# Patient Record
Sex: MALE | Race: WHITE | Employment: OTHER | URBAN - METROPOLITAN AREA
[De-identification: names, ages, dates, MRNs, and addresses within clinical notes are randomized per-mention and may not be internally consistent; named-entity substitution may affect disease eponyms.]

---

## 2017-01-19 ENCOUNTER — APPOINTMENT (OUTPATIENT)
Dept: ULTRASOUND IMAGING | Facility: HOSPITAL | Age: 62
DRG: 440 | End: 2017-01-19
Attending: EMERGENCY MEDICINE
Payer: COMMERCIAL

## 2017-01-19 ENCOUNTER — APPOINTMENT (OUTPATIENT)
Dept: CT IMAGING | Facility: HOSPITAL | Age: 62
DRG: 440 | End: 2017-01-19
Attending: EMERGENCY MEDICINE
Payer: COMMERCIAL

## 2017-01-19 ENCOUNTER — HOSPITAL ENCOUNTER (INPATIENT)
Facility: HOSPITAL | Age: 62
LOS: 3 days | Discharge: HOME | DRG: 440 | End: 2017-01-23
Attending: EMERGENCY MEDICINE | Admitting: HOSPITALIST
Payer: COMMERCIAL

## 2017-01-19 DIAGNOSIS — R79.89 LOW TSH LEVEL: ICD-10-CM

## 2017-01-19 DIAGNOSIS — E83.42 HYPOMAGNESEMIA: ICD-10-CM

## 2017-01-19 DIAGNOSIS — K85.90 ACUTE PANCREATITIS WITHOUT INFECTION OR NECROSIS, UNSPECIFIED PANCREATITIS TYPE: Primary | ICD-10-CM

## 2017-01-19 DIAGNOSIS — N18.9 CHRONIC RENAL INSUFFICIENCY, UNSPECIFIED STAGE: ICD-10-CM

## 2017-01-19 LAB
ALBUMIN SERPL BCP-MCNC: 4.4 G/DL (ref 3.5–4.8)
ALP SERPL-CCNC: 110 U/L (ref 32–100)
ALT SERPL-CCNC: 31 U/L (ref 17–63)
ANION GAP SERPL CALC-SCNC: 13 MMOL/L (ref 0–18)
AST SERPL-CCNC: 31 U/L (ref 15–41)
BACTERIA UR QL AUTO: NEGATIVE /HPF
BASOPHILS # BLD: 0.1 K/UL (ref 0–0.2)
BASOPHILS NFR BLD: 1 %
BILIRUB DIRECT SERPL-MCNC: 0.1 MG/DL (ref 0–0.2)
BILIRUB SERPL-MCNC: 0.8 MG/DL (ref 0.3–1.2)
BILIRUB UR QL: NEGATIVE
BUN SERPL-MCNC: 35 MG/DL (ref 8–20)
BUN/CREAT SERPL: 18.6 (ref 10–20)
CALCIUM SERPL-MCNC: 9 MG/DL (ref 8.5–10.5)
CHLORIDE SERPL-SCNC: 106 MMOL/L (ref 95–110)
CLARITY UR: CLEAR
CO2 SERPL-SCNC: 19 MMOL/L (ref 22–32)
COLOR UR: YELLOW
CREAT SERPL-MCNC: 1.88 MG/DL (ref 0.5–1.5)
EOSINOPHIL # BLD: 0.2 K/UL (ref 0–0.7)
EOSINOPHIL NFR BLD: 2 %
ERYTHROCYTE [DISTWIDTH] IN BLOOD BY AUTOMATED COUNT: 15 % (ref 11–15)
GLUCOSE SERPL-MCNC: 161 MG/DL (ref 70–99)
GLUCOSE UR-MCNC: NEGATIVE MG/DL
HCT VFR BLD AUTO: 42.3 % (ref 41–52)
HGB BLD-MCNC: 14.2 G/DL (ref 13.5–17.5)
HGB UR QL STRIP.AUTO: NEGATIVE
KETONES UR-MCNC: NEGATIVE MG/DL
LEUKOCYTE ESTERASE UR QL STRIP.AUTO: NEGATIVE
LIPASE SERPL-CCNC: 7116 U/L (ref 22–51)
LYMPHOCYTES # BLD: 2.7 K/UL (ref 1–4)
LYMPHOCYTES NFR BLD: 25 %
MAGNESIUM SERPL-MCNC: 1.6 MG/DL (ref 1.8–2.5)
MCH RBC QN AUTO: 29.7 PG (ref 27–32)
MCHC RBC AUTO-ENTMCNC: 33.5 G/DL (ref 32–37)
MCV RBC AUTO: 88.7 FL (ref 80–100)
MONOCYTES # BLD: 1.1 K/UL (ref 0–1)
MONOCYTES NFR BLD: 10 %
NEUTROPHILS # BLD AUTO: 6.6 K/UL (ref 1.8–7.7)
NEUTROPHILS NFR BLD: 62 %
NITRITE UR QL STRIP.AUTO: NEGATIVE
OSMOLALITY UR CALC.SUM OF ELEC: 297 MOSM/KG (ref 275–295)
PH UR: 5 [PH] (ref 5–8)
PLATELET # BLD AUTO: 136 K/UL (ref 140–400)
PMV BLD AUTO: 9.2 FL (ref 7.4–10.3)
POTASSIUM SERPL-SCNC: 3.5 MMOL/L (ref 3.3–5.1)
PROT SERPL-MCNC: 7.3 G/DL (ref 5.9–8.4)
PROT UR-MCNC: 30 MG/DL
RBC # BLD AUTO: 4.77 M/UL (ref 4.5–5.9)
RBC #/AREA URNS AUTO: 1 /HPF
SODIUM SERPL-SCNC: 138 MMOL/L (ref 136–144)
SP GR UR STRIP: 1.01 (ref 1–1.03)
T3 SERPL-MCNC: 1.04 NG/ML (ref 0.87–1.78)
T4 FREE SERPL-MCNC: 1.1 NG/DL (ref 0.58–1.64)
TSH SERPL-ACNC: 0.19 UIU/ML (ref 0.34–5.6)
UROBILINOGEN UR STRIP-ACNC: <2
VIT C UR-MCNC: 40 MG/DL
WBC # BLD AUTO: 10.7 K/UL (ref 4–11)
WBC #/AREA URNS AUTO: 1 /HPF

## 2017-01-19 PROCEDURE — 76705 ECHO EXAM OF ABDOMEN: CPT

## 2017-01-19 PROCEDURE — 74176 CT ABD & PELVIS W/O CONTRAST: CPT

## 2017-01-19 RX ORDER — SODIUM CHLORIDE 9 MG/ML
1000 INJECTION, SOLUTION INTRAVENOUS ONCE
Status: DISCONTINUED | OUTPATIENT
Start: 2017-01-19 | End: 2017-01-19

## 2017-01-19 RX ORDER — SODIUM CHLORIDE 9 MG/ML
1000 INJECTION, SOLUTION INTRAVENOUS ONCE
Status: COMPLETED | OUTPATIENT
Start: 2017-01-19 | End: 2017-01-19

## 2017-01-19 RX ORDER — MORPHINE SULFATE 4 MG/ML
4 INJECTION, SOLUTION INTRAMUSCULAR; INTRAVENOUS ONCE
Status: COMPLETED | OUTPATIENT
Start: 2017-01-19 | End: 2017-01-19

## 2017-01-19 RX ORDER — ONDANSETRON 2 MG/ML
4 INJECTION INTRAMUSCULAR; INTRAVENOUS ONCE
Status: COMPLETED | OUTPATIENT
Start: 2017-01-19 | End: 2017-01-19

## 2017-01-19 RX ORDER — HYDROMORPHONE HYDROCHLORIDE 1 MG/ML
1 INJECTION, SOLUTION INTRAMUSCULAR; INTRAVENOUS; SUBCUTANEOUS ONCE
Status: COMPLETED | OUTPATIENT
Start: 2017-01-19 | End: 2017-01-19

## 2017-01-19 RX ORDER — KETOROLAC TROMETHAMINE 30 MG/ML
15 INJECTION, SOLUTION INTRAMUSCULAR; INTRAVENOUS ONCE
Status: COMPLETED | OUTPATIENT
Start: 2017-01-19 | End: 2017-01-19

## 2017-01-19 RX ORDER — HYDROMORPHONE HYDROCHLORIDE 1 MG/ML
0.5 INJECTION, SOLUTION INTRAMUSCULAR; INTRAVENOUS; SUBCUTANEOUS ONCE
Status: COMPLETED | OUTPATIENT
Start: 2017-01-19 | End: 2017-01-19

## 2017-01-19 RX ORDER — HYDROMORPHONE HYDROCHLORIDE 1 MG/ML
0.5 INJECTION, SOLUTION INTRAMUSCULAR; INTRAVENOUS; SUBCUTANEOUS ONCE
Status: DISCONTINUED | OUTPATIENT
Start: 2017-01-19 | End: 2017-01-19

## 2017-01-20 PROBLEM — K85.90 ACUTE PANCREATITIS WITHOUT INFECTION OR NECROSIS, UNSPECIFIED PANCREATITIS TYPE (HCC): Status: ACTIVE | Noted: 2017-01-20

## 2017-01-20 PROBLEM — R79.89 LOW TSH LEVEL: Status: ACTIVE | Noted: 2017-01-20

## 2017-01-20 PROBLEM — N18.9 CHRONIC RENAL INSUFFICIENCY, UNSPECIFIED STAGE: Status: ACTIVE | Noted: 2017-01-20

## 2017-01-20 PROBLEM — K85.90 ACUTE PANCREATITIS WITHOUT INFECTION OR NECROSIS, UNSPECIFIED PANCREATITIS TYPE: Status: ACTIVE | Noted: 2017-01-20

## 2017-01-20 PROBLEM — E83.42 HYPOMAGNESEMIA: Status: ACTIVE | Noted: 2017-01-20

## 2017-01-20 LAB
ALBUMIN SERPL BCP-MCNC: 3.4 G/DL (ref 3.5–4.8)
ALBUMIN/GLOB SERPL: 1.4 {RATIO} (ref 1–2)
ALP SERPL-CCNC: 88 U/L (ref 32–100)
ALT SERPL-CCNC: 25 U/L (ref 17–63)
ANION GAP SERPL CALC-SCNC: 7 MMOL/L (ref 0–18)
AST SERPL-CCNC: 21 U/L (ref 15–41)
BASOPHILS # BLD: 0 K/UL (ref 0–0.2)
BASOPHILS NFR BLD: 0 %
BILIRUB SERPL-MCNC: 0.9 MG/DL (ref 0.3–1.2)
BUN SERPL-MCNC: 29 MG/DL (ref 8–20)
BUN/CREAT SERPL: 17.8 (ref 10–20)
CALCIUM SERPL-MCNC: 8.4 MG/DL (ref 8.5–10.5)
CHLORIDE SERPL-SCNC: 109 MMOL/L (ref 95–110)
CO2 SERPL-SCNC: 23 MMOL/L (ref 22–32)
CREAT SERPL-MCNC: 1.63 MG/DL (ref 0.5–1.5)
EOSINOPHIL # BLD: 0.1 K/UL (ref 0–0.7)
EOSINOPHIL NFR BLD: 1 %
ERYTHROCYTE [DISTWIDTH] IN BLOOD BY AUTOMATED COUNT: 14.9 % (ref 11–15)
GLOBULIN PLAS-MCNC: 2.4 G/DL (ref 2.5–3.7)
GLUCOSE SERPL-MCNC: 158 MG/DL (ref 70–99)
HCT VFR BLD AUTO: 38 % (ref 41–52)
HGB BLD-MCNC: 12.6 G/DL (ref 13.5–17.5)
LIPASE SERPL-CCNC: 1009 U/L (ref 22–51)
LYMPHOCYTES # BLD: 1.1 K/UL (ref 1–4)
LYMPHOCYTES NFR BLD: 11 %
MAGNESIUM SERPL-MCNC: 1.7 MG/DL (ref 1.8–2.5)
MCH RBC QN AUTO: 29.8 PG (ref 27–32)
MCHC RBC AUTO-ENTMCNC: 33.2 G/DL (ref 32–37)
MCV RBC AUTO: 89.7 FL (ref 80–100)
MONOCYTES # BLD: 0.7 K/UL (ref 0–1)
MONOCYTES NFR BLD: 7 %
NEUTROPHILS # BLD AUTO: 8.1 K/UL (ref 1.8–7.7)
NEUTROPHILS NFR BLD: 82 %
OSMOLALITY UR CALC.SUM OF ELEC: 297 MOSM/KG (ref 275–295)
PLATELET # BLD AUTO: 103 K/UL (ref 140–400)
PMV BLD AUTO: 8.9 FL (ref 7.4–10.3)
POTASSIUM SERPL-SCNC: 4 MMOL/L (ref 3.3–5.1)
PROT SERPL-MCNC: 5.8 G/DL (ref 5.9–8.4)
RBC # BLD AUTO: 4.24 M/UL (ref 4.5–5.9)
SODIUM SERPL-SCNC: 139 MMOL/L (ref 136–144)
TRIGL SERPL-MCNC: 277 MG/DL (ref 1–149)
WBC # BLD AUTO: 9.9 K/UL (ref 4–11)

## 2017-01-20 PROCEDURE — 99223 1ST HOSP IP/OBS HIGH 75: CPT | Performed by: HOSPITALIST

## 2017-01-20 PROCEDURE — 99221 1ST HOSP IP/OBS SF/LOW 40: CPT | Performed by: INTERNAL MEDICINE

## 2017-01-20 RX ORDER — ONDANSETRON 2 MG/ML
4 INJECTION INTRAMUSCULAR; INTRAVENOUS EVERY 4 HOURS PRN
Status: DISCONTINUED | OUTPATIENT
Start: 2017-01-20 | End: 2017-01-23

## 2017-01-20 RX ORDER — FUROSEMIDE 20 MG/1
10 TABLET ORAL DAILY
Status: ON HOLD | COMMUNITY
End: 2020-02-23

## 2017-01-20 RX ORDER — HEPARIN SODIUM 5000 [USP'U]/ML
5000 INJECTION, SOLUTION INTRAVENOUS; SUBCUTANEOUS EVERY 12 HOURS
Status: DISCONTINUED | OUTPATIENT
Start: 2017-01-20 | End: 2017-01-23

## 2017-01-20 RX ORDER — SODIUM CHLORIDE 0.9 % (FLUSH) 0.9 %
10 SYRINGE (ML) INJECTION AS NEEDED
Status: DISCONTINUED | OUTPATIENT
Start: 2017-01-20 | End: 2017-01-23

## 2017-01-20 RX ORDER — HYDROMORPHONE HYDROCHLORIDE 1 MG/ML
0.5 INJECTION, SOLUTION INTRAMUSCULAR; INTRAVENOUS; SUBCUTANEOUS
Status: DISCONTINUED | OUTPATIENT
Start: 2017-01-20 | End: 2017-01-23

## 2017-01-20 RX ORDER — AMLODIPINE BESYLATE 5 MG/1
5 TABLET ORAL DAILY
Status: DISCONTINUED | OUTPATIENT
Start: 2017-01-20 | End: 2017-01-23

## 2017-01-20 RX ORDER — DIPHENHYDRAMINE HCL 25 MG
25 CAPSULE ORAL ONCE
Status: COMPLETED | OUTPATIENT
Start: 2017-01-20 | End: 2017-01-20

## 2017-01-20 RX ORDER — LEVOTHYROXINE SODIUM 175 UG/1
175 TABLET ORAL
COMMUNITY

## 2017-01-20 RX ORDER — HYDROMORPHONE HYDROCHLORIDE 1 MG/ML
1 INJECTION, SOLUTION INTRAMUSCULAR; INTRAVENOUS; SUBCUTANEOUS
Status: DISCONTINUED | OUTPATIENT
Start: 2017-01-20 | End: 2017-01-23

## 2017-01-20 RX ORDER — LEVOTHYROXINE SODIUM 175 UG/1
175 TABLET ORAL
Status: DISCONTINUED | OUTPATIENT
Start: 2017-01-20 | End: 2017-01-23

## 2017-01-20 RX ORDER — SODIUM CHLORIDE, SODIUM LACTATE, POTASSIUM CHLORIDE, CALCIUM CHLORIDE 600; 310; 30; 20 MG/100ML; MG/100ML; MG/100ML; MG/100ML
INJECTION, SOLUTION INTRAVENOUS CONTINUOUS
Status: DISCONTINUED | OUTPATIENT
Start: 2017-01-20 | End: 2017-01-22

## 2017-01-20 RX ORDER — HYDROCODONE BITARTRATE AND ACETAMINOPHEN 5; 325 MG/1; MG/1
1 TABLET ORAL EVERY 6 HOURS PRN
Status: DISCONTINUED | OUTPATIENT
Start: 2017-01-20 | End: 2017-01-23

## 2017-01-20 RX ORDER — ACETAMINOPHEN 325 MG/1
650 TABLET ORAL EVERY 6 HOURS PRN
Status: DISCONTINUED | OUTPATIENT
Start: 2017-01-20 | End: 2017-01-23

## 2017-01-20 NOTE — ED INITIAL ASSESSMENT (HPI)
Pt brought in via EMS from work for c/o abd pain N/V which onset approx. 1500. Denies having diarrhea or fevers. Denies sob or cp.

## 2017-01-20 NOTE — PROGRESS NOTES
[facility]     Gastroenterology Progress Note    Mary Kenyon Patient Status:  Inpatient    3/16/1955 MRN [id number]   Location [facility] 4W/SW/SE Attending Teressa Mart MD   Hosp Day # 1 PCP None Pcp       Assessm Psychiatric/Behavioral: Negative for behavioral problems and agitation. All other systems reviewed and are negative.       Objective:   Patient Vitals for the past 24 hrs:   BP Temp Temp src Pulse Resp SpO2 Height Weight   01/20/17 0537 140/88 mmHg (!) alert and oriented to person, place, and time. No motor deficit. Skin: Skin is warm and dry. No lesion and no rash noted. He is not diaphoretic. No erythema. No pallor. Psychiatric: He has a normal mood and affect.  His behavior is normal. Judgment and

## 2017-01-20 NOTE — ED NOTES
Pt states hx of partial bowel obstruction last year. Pt reports having same s/s at that time, N/V and abd pain. Diffuse ABD tenderness noted upon palpation.  Pt c/o severe abd pain, Dr. Kandi Winters updated and orders for pain medication and zofran received an

## 2017-01-20 NOTE — ED PROVIDER NOTES
Patient Seen in: [facility] AND [facility] Emergency Department    History   Patient presents with:  Nausea/Vomiting/Diarrhea (gastrointestinal)    Stated Complaint: N/V    HPI    59-year-old male presents for complaint of epigastric abdominal pain, nausea, vomi Negative. Musculoskeletal: Negative. Skin: Negative. Neurological: Negative. All other systems reviewed and are negative. Positive for stated complaint: N/V  Other systems are as noted in HPI. Constitutional and vital signs reviewed. Calculated Osmolality 297 (*)     GFR, Non- 37 (*)     GFR, -American 44 (*)     All other components within normal limits   MAGNESIUM - Abnormal; Notable for the following:     Magnesium 1.6 (*)     All other components within genoveva at 175 mL/hr    Imaging:   Ct Abdomen+pelvis(cpt=74176)    1/19/2017  PROCEDURE:   CT ABDOMEN + PELVIS (SLE=89039)  COMPARISON:   [facility], CT PF RENAL STONE ABD/P WO CON, 12/21/2014, 1:37.   INDICATIONS:   Patient has abdomen  pain and  e (CST): Rakesh Varma MD on 1/19/2017 at 19:55     Approved by (CST): Rakesh Varma MD on 1/19/2017 at 20:03          1/19/2017  CONCLUSION:  1. Acute pancreatitis. 2. Cholelithiasis.              SpO2: Normal 99%               Dispositio

## 2017-01-20 NOTE — CONSULTS
[facility]    PATIENT'S NAME: Vashti Whiting   ATTENDING PHYSICIAN: Loly Meyers MD   CONSULTING PHYSICIAN: Neal Perez MD   PATIENT ACCOUNT#:   [de-identified]    LOCATION:  [address] [place] [address] Road #:   [id number]       DATE OF BIRTH: Colon cancer. 5.   Gout. 6.   Small-bowel obstruction. 7.   Complex thoracic trauma due to a horseback riding injury (2013). 8.   Thyroid cancer. 9.   Parathyroid adenoma. PAST SURGICAL HISTORY:    1. Total thyroidectomy and parathyroidectomy. abscess or fluid collection.   Other findings include the followin.8 cm gallstone, no evidence for cholecystitis, mild atherosclerosis, transverse colon staple line, no evidence for bowel obstruction, no significant lymphadenopathy, mild degenerative sp 11:32:06  t: 01/20/2017 14:22:39  Job [id number]  [facility]/

## 2017-01-20 NOTE — CONSULTS
[facility] AND [facility]  Report of Consultation    Dana Proud Patient Status:  Emergency    3/16/1955 MRN [id number]   Location [address] Attending Walter Holt MD   Hosp Day # 1 PCP None Pcp     Reason for Consultat tenderness. Extremities: No edema, cyanosis, or clubbing. Skin: Warm and dry.     Laboratory Data:    Lab Results  Component Value Date   WBC 10.7 01/19/2017   HGB 14.2 01/19/2017   HCT 42.3 01/19/2017    01/19/2017   CREATSERUM 1.88 01/19/2017   B

## 2017-01-20 NOTE — H&P
[address] Patient Status:  Inpatient    3/16/1955 MRN [id number]   Location [facility] 4W/SW/SE Attending Tobin Moser MD   Hosp Day # 1 PCP None Pcp     Date:  2017  Date of Admi (240 MG) MG Oral Tab Take 800 mg by mouth 2 (two) times daily. Review of Systems:   Pertinent items are noted in HPI. Physical Exam:   Vital Signs:  Blood pressure 140/88, pulse 81, temperature 97.4 °F (36.3 °C), temperature source Oral, resp.  r cancer, non-hodgkins lymphoma and also thyroid cancer    Hx.  Of kidney stone    Prophylaxis  -hep sq    Mandi Henry MD  1/20/2017

## 2017-01-21 LAB
ANION GAP SERPL CALC-SCNC: 6 MMOL/L (ref 0–18)
BASOPHILS # BLD: 0 K/UL (ref 0–0.2)
BASOPHILS NFR BLD: 0 %
BUN SERPL-MCNC: 19 MG/DL (ref 8–20)
BUN/CREAT SERPL: 12.8 (ref 10–20)
CALCIUM SERPL-MCNC: 8.5 MG/DL (ref 8.5–10.5)
CHLORIDE SERPL-SCNC: 107 MMOL/L (ref 95–110)
CO2 SERPL-SCNC: 25 MMOL/L (ref 22–32)
CREAT SERPL-MCNC: 1.49 MG/DL (ref 0.5–1.5)
EOSINOPHIL # BLD: 0.1 K/UL (ref 0–0.7)
EOSINOPHIL NFR BLD: 1 %
ERYTHROCYTE [DISTWIDTH] IN BLOOD BY AUTOMATED COUNT: 14.6 % (ref 11–15)
GLUCOSE SERPL-MCNC: 137 MG/DL (ref 70–99)
HCT VFR BLD AUTO: 36.2 % (ref 41–52)
HGB BLD-MCNC: 12.4 G/DL (ref 13.5–17.5)
LYMPHOCYTES # BLD: 1 K/UL (ref 1–4)
LYMPHOCYTES NFR BLD: 12 %
MAGNESIUM SERPL-MCNC: 1.5 MG/DL (ref 1.8–2.5)
MCH RBC QN AUTO: 29.9 PG (ref 27–32)
MCHC RBC AUTO-ENTMCNC: 34.2 G/DL (ref 32–37)
MCV RBC AUTO: 87.4 FL (ref 80–100)
MONOCYTES # BLD: 0.7 K/UL (ref 0–1)
MONOCYTES NFR BLD: 9 %
NEUTROPHILS # BLD AUTO: 6.6 K/UL (ref 1.8–7.7)
NEUTROPHILS NFR BLD: 78 %
OSMOLALITY UR CALC.SUM OF ELEC: 290 MOSM/KG (ref 275–295)
PLATELET # BLD AUTO: 101 K/UL (ref 140–400)
PMV BLD AUTO: 9 FL (ref 7.4–10.3)
POTASSIUM SERPL-SCNC: 3.8 MMOL/L (ref 3.3–5.1)
RBC # BLD AUTO: 4.14 M/UL (ref 4.5–5.9)
SODIUM SERPL-SCNC: 138 MMOL/L (ref 136–144)
WBC # BLD AUTO: 8.5 K/UL (ref 4–11)

## 2017-01-21 PROCEDURE — 99233 SBSQ HOSP IP/OBS HIGH 50: CPT | Performed by: HOSPITALIST

## 2017-01-21 RX ORDER — POTASSIUM CHLORIDE 20 MEQ/1
40 TABLET, EXTENDED RELEASE ORAL ONCE
Status: COMPLETED | OUTPATIENT
Start: 2017-01-21 | End: 2017-01-21

## 2017-01-21 RX ORDER — MAGNESIUM OXIDE 400 MG (241.3 MG MAGNESIUM) TABLET
800 TABLET ONCE
Status: COMPLETED | OUTPATIENT
Start: 2017-01-21 | End: 2017-01-21

## 2017-01-21 NOTE — PROGRESS NOTES
[facility]    Progress note     Nato Course Patient Status:  Inpatient    3/16/1955 MRN [id number]   Location [facility] 4W/SW/SE Attending Jessica Phelps MD   Hosp Day # 2 PCP None Pcp     Date:  2017  Date of Admissio SpO2 95 %. GENERAL:  The patient appeared to be in no distress and was comfortable. SKIN:  Warm and hydrated  PSYCHIATRIC: Calm and cooperative   HEENT:  Head was atraumatic and normocephalic. Eyes: Sclera was anicteric.   Pupils were equal.   NECK:

## 2017-01-21 NOTE — PROGRESS NOTES
[facility]     Gastroenterology Progress Note    Chun Kirkpatrick Patient Status:  Inpatient    3/16/1955 MRN [id number]   Location [facility] 4W/SW/SE Attending Roosvelt Habermann, MD   Hosp Day # 2 PCP None Pcp       Asses light-headedness and headaches. Hematological: Negative for adenopathy. Does not bruise/bleed easily. Psychiatric/Behavioral: Negative for behavioral problems and agitation. All other systems reviewed and are negative.       Objective:   Patient Vital not diaphoretic. No erythema. Psychiatric: He has a normal mood and affect.  His behavior is normal. Judgment and thought content normal.       Results:     Recent Labs   Lab  01/19/17   1840  01/20/17   0722  01/21/17   0638   RBC  4.77  4.24*  4.14*   H

## 2017-01-22 LAB
ALBUMIN SERPL BCP-MCNC: 2.9 G/DL (ref 3.5–4.8)
ALBUMIN/GLOB SERPL: 1.1 {RATIO} (ref 1–2)
ALP SERPL-CCNC: 83 U/L (ref 32–100)
ALT SERPL-CCNC: 18 U/L (ref 17–63)
ANION GAP SERPL CALC-SCNC: 5 MMOL/L (ref 0–18)
AST SERPL-CCNC: 18 U/L (ref 15–41)
BASOPHILS # BLD: 0 K/UL (ref 0–0.2)
BASOPHILS NFR BLD: 0 %
BILIRUB SERPL-MCNC: 1.3 MG/DL (ref 0.3–1.2)
BUN SERPL-MCNC: 15 MG/DL (ref 8–20)
BUN/CREAT SERPL: 9.4 (ref 10–20)
CALCIUM SERPL-MCNC: 8.5 MG/DL (ref 8.5–10.5)
CHLORIDE SERPL-SCNC: 105 MMOL/L (ref 95–110)
CO2 SERPL-SCNC: 30 MMOL/L (ref 22–32)
CREAT SERPL-MCNC: 1.59 MG/DL (ref 0.5–1.5)
EOSINOPHIL # BLD: 0.2 K/UL (ref 0–0.7)
EOSINOPHIL NFR BLD: 3 %
ERYTHROCYTE [DISTWIDTH] IN BLOOD BY AUTOMATED COUNT: 15 % (ref 11–15)
GLOBULIN PLAS-MCNC: 2.7 G/DL (ref 2.5–3.7)
GLUCOSE SERPL-MCNC: 120 MG/DL (ref 70–99)
HCT VFR BLD AUTO: 35.2 % (ref 41–52)
HGB BLD-MCNC: 11.9 G/DL (ref 13.5–17.5)
LIPASE SERPL-CCNC: 58 U/L (ref 22–51)
LYMPHOCYTES # BLD: 1.1 K/UL (ref 1–4)
LYMPHOCYTES NFR BLD: 15 %
MAGNESIUM SERPL-MCNC: 1.3 MG/DL (ref 1.8–2.5)
MCH RBC QN AUTO: 29.8 PG (ref 27–32)
MCHC RBC AUTO-ENTMCNC: 33.9 G/DL (ref 32–37)
MCV RBC AUTO: 87.8 FL (ref 80–100)
MONOCYTES # BLD: 0.8 K/UL (ref 0–1)
MONOCYTES NFR BLD: 11 %
NEUTROPHILS # BLD AUTO: 5.4 K/UL (ref 1.8–7.7)
NEUTROPHILS NFR BLD: 72 %
OSMOLALITY UR CALC.SUM OF ELEC: 292 MOSM/KG (ref 275–295)
PLATELET # BLD AUTO: 88 K/UL (ref 140–400)
PMV BLD AUTO: 9.5 FL (ref 7.4–10.3)
POTASSIUM SERPL-SCNC: 4 MMOL/L (ref 3.3–5.1)
PROT SERPL-MCNC: 5.6 G/DL (ref 5.9–8.4)
RBC # BLD AUTO: 4.01 M/UL (ref 4.5–5.9)
SODIUM SERPL-SCNC: 140 MMOL/L (ref 136–144)
WBC # BLD AUTO: 7.6 K/UL (ref 4–11)

## 2017-01-22 PROCEDURE — 99232 SBSQ HOSP IP/OBS MODERATE 35: CPT | Performed by: HOSPITALIST

## 2017-01-22 RX ORDER — MAGNESIUM OXIDE 400 MG (241.3 MG MAGNESIUM) TABLET
800 TABLET ONCE
Status: COMPLETED | OUTPATIENT
Start: 2017-01-22 | End: 2017-01-22

## 2017-01-22 RX ORDER — LOPERAMIDE HYDROCHLORIDE 2 MG/1
2 CAPSULE ORAL 4 TIMES DAILY PRN
Status: DISCONTINUED | OUTPATIENT
Start: 2017-01-22 | End: 2017-01-23

## 2017-01-22 RX ORDER — DIPHENHYDRAMINE HCL 25 MG
25 CAPSULE ORAL NIGHTLY PRN
Status: DISCONTINUED | OUTPATIENT
Start: 2017-01-22 | End: 2017-01-23

## 2017-01-22 NOTE — PROGRESS NOTES
[place] [facility] - [facility]    Progress note     Alexandra Sanders Patient Status:  Inpatient    3/16/1955 MRN [id number]   Location [facility] 4W/SW/SE Attending Celsa Wright MD   [facility] Day # 3 PCP None Pcp     Date:  2017  Date of Admissio be in no distress and was comfortable. SKIN:  Warm and hydrated  PSYCHIATRIC: Calm and cooperative   HEENT:  Head was atraumatic and normocephalic. Eyes: Sclera was anicteric. Pupils were equal.   NECK:  There was no JVD.    CHEST:  Symmetrical movement

## 2017-01-22 NOTE — PROGRESS NOTES
[facility]    Progress Note    Maggie Georgieut Patient Status:  Inpatient    3/16/1955 MRN [id number]   Location [facility] 4W/SW/SE Attending Bernabe Samson MD   Hosp Day # 3 PCP None Pcp       Subjective:   Denies complaints 01/22/2017   ALB 2.9 01/22/2017   TP 5.6 01/22/2017                   Savana Cohen MD  1/22/2017

## 2017-01-22 NOTE — PROGRESS NOTES
[facility]     Gastroenterology Progress Note    Tino Sales Patient Status:  Inpatient    3/16/1955 MRN [id number]   Location [facility] 4W/SW/SE Attending Travis Umaña MD   Hosp Day # 3 PCP None Pcp       Asses Psychiatric/Behavioral: Negative for behavioral problems and agitation. All other systems reviewed and are negative.       Objective:   Patient Vitals for the past 24 hrs:   BP Temp Temp src Pulse Resp SpO2   01/22/17 0500 128/82 mmHg 98.7 °F (37.1 °C) Recent Labs   Lab  01/20/17   0722  01/21/17   0638  01/22/17   0623   RBC  4.24*  4.14*  4.01*   HGB  12.6*  12.4*  11.9*   HCT  38.0*  36.2*  35.2*   MCV  89.7  87.4  87.8   MCH  29.8  29.9  29.8   MCHC  33.2  34.2  33.9   RDW  14.9  14.6  15.0   WBC

## 2017-01-23 VITALS
SYSTOLIC BLOOD PRESSURE: 135 MMHG | WEIGHT: 170 LBS | DIASTOLIC BLOOD PRESSURE: 86 MMHG | TEMPERATURE: 99 F | OXYGEN SATURATION: 96 % | HEIGHT: 70 IN | HEART RATE: 99 BPM | BODY MASS INDEX: 24.34 KG/M2 | RESPIRATION RATE: 18 BRPM

## 2017-01-23 LAB
ALBUMIN SERPL BCP-MCNC: 3 G/DL (ref 3.5–4.8)
ALBUMIN/GLOB SERPL: 1 {RATIO} (ref 1–2)
ALP SERPL-CCNC: 93 U/L (ref 32–100)
ALT SERPL-CCNC: 18 U/L (ref 17–63)
ANION GAP SERPL CALC-SCNC: 7 MMOL/L (ref 0–18)
AST SERPL-CCNC: 16 U/L (ref 15–41)
BASOPHILS # BLD: 0 K/UL (ref 0–0.2)
BASOPHILS NFR BLD: 0 %
BILIRUB SERPL-MCNC: 0.9 MG/DL (ref 0.3–1.2)
BUN SERPL-MCNC: 17 MG/DL (ref 8–20)
BUN/CREAT SERPL: 10.7 (ref 10–20)
CALCIUM SERPL-MCNC: 8.5 MG/DL (ref 8.5–10.5)
CHLORIDE SERPL-SCNC: 103 MMOL/L (ref 95–110)
CO2 SERPL-SCNC: 28 MMOL/L (ref 22–32)
CREAT SERPL-MCNC: 1.59 MG/DL (ref 0.5–1.5)
EOSINOPHIL # BLD: 0.3 K/UL (ref 0–0.7)
EOSINOPHIL NFR BLD: 3 %
ERYTHROCYTE [DISTWIDTH] IN BLOOD BY AUTOMATED COUNT: 14.6 % (ref 11–15)
GLOBULIN PLAS-MCNC: 2.9 G/DL (ref 2.5–3.7)
GLUCOSE SERPL-MCNC: 129 MG/DL (ref 70–99)
HCT VFR BLD AUTO: 34.9 % (ref 41–52)
HGB BLD-MCNC: 11.9 G/DL (ref 13.5–17.5)
LYMPHOCYTES # BLD: 1.1 K/UL (ref 1–4)
LYMPHOCYTES NFR BLD: 13 %
MAGNESIUM SERPL-MCNC: 1.3 MG/DL (ref 1.8–2.5)
MCH RBC QN AUTO: 29.9 PG (ref 27–32)
MCHC RBC AUTO-ENTMCNC: 34 G/DL (ref 32–37)
MCV RBC AUTO: 87.9 FL (ref 80–100)
MONOCYTES # BLD: 1 K/UL (ref 0–1)
MONOCYTES NFR BLD: 11 %
NEUTROPHILS # BLD AUTO: 6.8 K/UL (ref 1.8–7.7)
NEUTROPHILS NFR BLD: 74 %
OSMOLALITY UR CALC.SUM OF ELEC: 289 MOSM/KG (ref 275–295)
PLATELET # BLD AUTO: 97 K/UL (ref 140–400)
PMV BLD AUTO: 9.4 FL (ref 7.4–10.3)
POTASSIUM SERPL-SCNC: 3.7 MMOL/L (ref 3.3–5.1)
PROT SERPL-MCNC: 5.9 G/DL (ref 5.9–8.4)
RBC # BLD AUTO: 3.97 M/UL (ref 4.5–5.9)
SODIUM SERPL-SCNC: 138 MMOL/L (ref 136–144)
WBC # BLD AUTO: 9.2 K/UL (ref 4–11)

## 2017-01-23 PROCEDURE — 99239 HOSP IP/OBS DSCHRG MGMT >30: CPT | Performed by: HOSPITALIST

## 2017-01-23 NOTE — PAYOR COMM NOTE
Admit Orders     Start     Ordered    01/20/17 0119  Admit to inpatient Once -  ([facility] [address] [facility])   Once     Ordering Provider:  Oniel Coleman MD   Question Answer Comment   Diagnosis Acute pancreatitis without infection or

## 2017-01-23 NOTE — DISCHARGE SUMMARY
[facility]    Discharge Summary    Gina Mcgrath Patient Status:  Inpatient    3/16/1955 MRN [id number]   Location [facility] 4W/SW/SE Attending Mary Williamson MD   Hosp Day # 4 PCP None Pcp     Date of Admission:  Levothyroxine Sodium 175 MCG Tabs   Last time this was given:  175 mcg on 1/23/2017  7:04 AM   Commonly known as:  SYNTHROID, LEVOTHROID        Take 175 mcg by mouth before breakfast.    Refills:  0       lisinopril 40 MG Tabs   Commonly known as:  PRIN

## 2017-01-23 NOTE — PAYOR COMM NOTE
Attending Physician: No att. providers found    Review Type: CONTINUED STAY  Reviewer: Polo Garcia     Date: January 23, 2017 - 4:12 PM  Payor: [address] Number: [id number]  Admit date: 1/19/2017  6:28 PM :  South Senior MD (Physician)             Patient Seen in: [facility] Emergency Department    History   Patient presents with:  Nausea/Vomiting/Diarrhea (gastrointestinal)    Stated Complaint: N/V    HPI    51-year-old male pr Positive for nausea, vomiting and abdominal pain. Genitourinary: Negative. Musculoskeletal: Negative. Skin: Negative. Neurological: Negative. All other systems reviewed and are negative.       Positive for stated complaint: N/V  Other systems (*)     CO2 19 (*)     BUN 35 (*)     Creatinine 1.88 (*)     Calculated Osmolality 297 (*)     GFR, Non- 37 (*)     GFR, -American 44 (*)     All other components within normal limits   MAGNESIUM - Abnormal; Notable for the followin accepts admission  Dr. Yandel Vail accepts consult, requests IV fluids at 175 mL/hr    Imaging:   Ct Abdomen+pelvis(cpt=74176)    1/19/2017  PROCEDURE:   CT ABDOMEN + PELVIS (CPT=74176)  COMPARISON:   [place] [facility], CT PF RENAL STONE ABD/P WO CON, 1 visible pulmonary or pleural disease. OTHER: Negative. Dictated by (CST): Ronney Hodgkin MD on 1/19/2017 at 19:55     Approved by (CST): Ronney Hodgkin MD on 1/19/2017 at 20:03          1/19/2017  CONCLUSION:  1. Acute pancreatitis.  2. Medical History   Diagnosis Date   • Pneumothorax 2013     partial pneumothorax left lung and minimal on right side   • Colon cancer ([facility]     • Non Hodgkin's lymphoma ([facility]    • H/O bone marrow transplant ([facility]    • Bilateral cataracts      bilateral avila Well healed incisions  MUSCULOSKELETAL:  There was no deformity. There was full range of motion in all the extremities. EXTREMITIES: There was no edema, clubbing or cyanosis  NEUROLOGICAL:  There was no focal deficit.   Cranial nerves II through XII were

## 2017-01-23 NOTE — PLAN OF CARE
GASTROINTESTINAL - ADULT    • Minimal or absence of nausea and vomiting Adequate for Discharge    • Maintains or returns to baseline bowel function Adequate for Discharge        Integumentary status not within defined limits    • Pt's integumentary status

## 2017-04-10 ENCOUNTER — OFFICE VISIT (OUTPATIENT)
Dept: DERMATOLOGY CLINIC | Facility: CLINIC | Age: 62
End: 2017-04-10

## 2017-04-10 DIAGNOSIS — L82.1 SEBORRHEIC KERATOSES: ICD-10-CM

## 2017-04-10 DIAGNOSIS — D23.30 BENIGN NEOPLASM OF SKIN OF FACE: ICD-10-CM

## 2017-04-10 DIAGNOSIS — D23.5 BENIGN NEOPLASM OF SKIN OF TRUNK, EXCEPT SCROTUM: ICD-10-CM

## 2017-04-10 DIAGNOSIS — D23.4 BENIGN NEOPLASM OF SCALP AND SKIN OF NECK: ICD-10-CM

## 2017-04-10 DIAGNOSIS — D23.60 BENIGN NEOPLASM OF SKIN OF UPPER LIMB, INCLUDING SHOULDER, UNSPECIFIED LATERALITY: Primary | ICD-10-CM

## 2017-04-10 PROCEDURE — 99213 OFFICE O/P EST LOW 20 MIN: CPT | Performed by: DERMATOLOGY

## 2017-04-10 PROCEDURE — 99212 OFFICE O/P EST SF 10 MIN: CPT | Performed by: DERMATOLOGY

## 2017-04-13 ENCOUNTER — APPOINTMENT (OUTPATIENT)
Dept: PREADMISSION TESTING | Facility: HOSPITAL | Age: 62
End: 2017-04-13

## 2017-04-13 VITALS
BODY MASS INDEX: 25.76 KG/M2 | OXYGEN SATURATION: 98 % | DIASTOLIC BLOOD PRESSURE: 71 MMHG | HEIGHT: 71 IN | RESPIRATION RATE: 16 BRPM | WEIGHT: 184 LBS | SYSTOLIC BLOOD PRESSURE: 124 MMHG | HEART RATE: 68 BPM | TEMPERATURE: 98.9 F

## 2017-04-13 LAB
ANION GAP SERPL CALCULATED.3IONS-SCNC: 12.7 MMOL/L
BUN BLD-MCNC: 23 MG/DL (ref 8–23)
BUN/CREAT SERPL: 21.5 (ref 7–25)
CALCIUM SPEC-SCNC: 9.3 MG/DL (ref 8.6–10.5)
CHLORIDE SERPL-SCNC: 98 MMOL/L (ref 98–107)
CO2 SERPL-SCNC: 25.3 MMOL/L (ref 22–29)
CREAT BLD-MCNC: 1.07 MG/DL (ref 0.76–1.27)
DEPRECATED RDW RBC AUTO: 41.1 FL (ref 37–54)
ERYTHROCYTE [DISTWIDTH] IN BLOOD BY AUTOMATED COUNT: 12.1 % (ref 11.5–14.5)
GFR SERPL CREATININE-BSD FRML MDRD: 70 ML/MIN/1.73
GLUCOSE BLD-MCNC: 239 MG/DL (ref 65–99)
HCT VFR BLD AUTO: 40.8 % (ref 40.4–52.2)
HGB BLD-MCNC: 14.1 G/DL (ref 13.7–17.6)
MCH RBC QN AUTO: 32.1 PG (ref 27–32.7)
MCHC RBC AUTO-ENTMCNC: 34.6 G/DL (ref 32.6–36.4)
MCV RBC AUTO: 92.9 FL (ref 79.8–96.2)
PLATELET # BLD AUTO: 204 10*3/MM3 (ref 140–500)
PMV BLD AUTO: 10.2 FL (ref 6–12)
POTASSIUM BLD-SCNC: 4.2 MMOL/L (ref 3.5–5.2)
RBC # BLD AUTO: 4.39 10*6/MM3 (ref 4.6–6)
SODIUM BLD-SCNC: 136 MMOL/L (ref 136–145)
WBC NRBC COR # BLD: 6.71 10*3/MM3 (ref 4.5–10.7)

## 2017-04-13 PROCEDURE — 36415 COLL VENOUS BLD VENIPUNCTURE: CPT

## 2017-04-13 PROCEDURE — 93010 ELECTROCARDIOGRAM REPORT: CPT | Performed by: INTERNAL MEDICINE

## 2017-04-13 PROCEDURE — 80048 BASIC METABOLIC PNL TOTAL CA: CPT

## 2017-04-13 PROCEDURE — 85027 COMPLETE CBC AUTOMATED: CPT

## 2017-04-13 PROCEDURE — 93005 ELECTROCARDIOGRAM TRACING: CPT

## 2017-04-13 RX ORDER — INSULIN GLARGINE 100 [IU]/ML
12 INJECTION, SOLUTION SUBCUTANEOUS NIGHTLY
COMMUNITY
End: 2019-10-14

## 2017-04-13 RX ORDER — LEVOTHYROXINE SODIUM 137 UG/1
137 TABLET ORAL EVERY MORNING
COMMUNITY
End: 2022-06-01

## 2017-04-13 NOTE — DISCHARGE INSTRUCTIONS
SURGERY 4-20-17   ARRIVAL TIME 9:30    Take the following medications the morning of surgery with a small sip of water:    NONE          General Instructions:  • Do not eat solid food after midnight the night before surgery.  • You may drink clear liquids day of surgery but must stop at least one hour before your hospital arrival time.  • It is beneficial for you to have a clear drink that contains carbohydrates the day of surgery.  We suggest a 20 ounce bottle of Gatorade or Powerade for non-diabetic patients or a 20 ounce bottle of G2 or Powerade Zero for diabetic patients. (Pediatric patients, are not advised to drink a 20 ounce carbohydrate drink)    Clear liquids are liquids you can see through.  Nothing red in color.     Plain water                               Sports drinks  Sodas                                   Gelatin (Jell-O)  Fruit juices without pulp such as white grape juice and apple juice  Popsicles that contain no fruit or yogurt  Tea or coffee (no cream or milk added)  Gatorade / Powerade  G2 / Powerade Zero    • Infants may have breast milk up to four hours before surgery.  • Infants drinking formula may drink formula up to six hours before surgery.   • Patients who avoid smoking, chewing tobacco and alcohol for 4 weeks prior to surgery have a reduced risk of post-operative complications.  Quit smoking as many days before surgery as you can.  • Do not smoke, use chewing tobacco or drink alcohol the day of surgery.   • If applicable bring your C-PAP/ BI-PAP machine.  • Bring any papers given to you in the doctor’s office.  • Wear clean comfortable clothes and socks.  • Do not wear contact lenses or make-up.  Bring a case for your glasses.   • Bring crutches or walker if applicable.  • Leave all other valuables and jewelry at home.  • The Pre-Admission Testing nurse will instruct you to bring medications if unable to obtain an accurate list in Pre-Admission Testing.        If you were given a  blood bank ID arm band remember to bring it with you the day of surgery.    Preventing a Surgical Site Infection:  • For 2 to 3 days before surgery, avoid shaving with a razor because the razor can irritate skin and make it easier to develop an infection.  • The night prior to surgery sleep in a clean bed with clean clothing.  Do not allow pets to sleep with you.  • Shower on the morning of surgery using a fresh bar of anti-bacterial soap (such as Dial) and clean washcloth.  Dry with a clean towel and dress in clean clothing.  • Ask your surgeon if you will be receiving antibiotics prior to surgery.  • Make sure you, your family, and all healthcare providers clean their hands with soap and water or an alcohol based hand  before caring for you or your wound.    Day of surgery:  Upon arrival, a Pre-op nurse and Anesthesiologist will review your health history, obtain vital signs, and answer questions you may have.  The only belongings needed at this time will be your home medications and if applicable your C-PAP/BI-PAP machine.  If you are staying overnight your family can leave the rest of your belongings in the car and bring them to your room later.  A Pre-op nurse will start an IV and you may receive medication in preparation for surgery, including something to help you relax.  Your family will be able to see you in the Pre-op area.  While you are in surgery your family should notify the waiting room  if they leave the waiting room area and provide a contact phone number.    Please be aware that surgery does come with discomfort.  We want to make every effort to control your discomfort so please discuss any uncontrolled symptoms with your nurse.   Your doctor will most likely have prescribed pain medications.      If you are going home after surgery you will receive individualized written care instructions before being discharged.  A responsible adult must drive you to and from the hospital on  the day of your surgery and stay with you for 24 hours.    If you are staying overnight following surgery, you will be transported to your hospital room following the recovery period.  [facility] has all private rooms.    If you have any questions please call Pre-Admission Testing at [phone].  Deductibles and co-payments are collected on the day of service. Please be prepared to pay the required co-pay, deductible or deposit on the day of service as defined by your plan.

## 2017-04-24 NOTE — PROGRESS NOTES
Melissa Aguirre is a 58year old male.   HPI:     CC:  Patient presents with:  Full Skin Exam: established pt - presents for 2 years skin exam, no hx of skin CA - denies any skin changes, but please check new spot to left 5th finger        Allergies:  Drope transplant [facility])    • Bilateral cataracts      bilateral cataract surgery   • Essential hypertension    • Bowel obstruction (HCC)    • High blood pressure    • Cataract          Past Surgical History    CATARACT Bilateral     TONSILLECTOMY      VASECTOMY pigmented, macules and papules 6 mm and less scattered on exam. pigmented lesions examined with dermoscopy benign-appearing patterns. Waxy tannish keratotic papules scattered, cherry-red vascular papules scattered.     See map today's date for lesions n

## 2017-05-11 ENCOUNTER — APPOINTMENT (OUTPATIENT)
Dept: PREADMISSION TESTING | Facility: HOSPITAL | Age: 62
End: 2017-05-11

## 2017-05-11 VITALS
RESPIRATION RATE: 16 BRPM | DIASTOLIC BLOOD PRESSURE: 81 MMHG | OXYGEN SATURATION: 96 % | TEMPERATURE: 97.8 F | SYSTOLIC BLOOD PRESSURE: 137 MMHG | WEIGHT: 187 LBS | HEART RATE: 66 BPM | BODY MASS INDEX: 26.18 KG/M2 | HEIGHT: 71 IN

## 2017-05-11 DIAGNOSIS — H02.403 PTOSIS OF EYELID, BILATERAL: Primary | ICD-10-CM

## 2017-05-11 DIAGNOSIS — H11.231 SYMBLEPHARON OF RIGHT EYE: ICD-10-CM

## 2017-05-11 LAB
ANION GAP SERPL CALCULATED.3IONS-SCNC: 13.5 MMOL/L
BUN BLD-MCNC: 18 MG/DL (ref 8–23)
BUN/CREAT SERPL: 16.5 (ref 7–25)
CALCIUM SPEC-SCNC: 9 MG/DL (ref 8.6–10.5)
CHLORIDE SERPL-SCNC: 100 MMOL/L (ref 98–107)
CO2 SERPL-SCNC: 24.5 MMOL/L (ref 22–29)
CREAT BLD-MCNC: 1.09 MG/DL (ref 0.76–1.27)
DEPRECATED RDW RBC AUTO: 42.9 FL (ref 37–54)
ERYTHROCYTE [DISTWIDTH] IN BLOOD BY AUTOMATED COUNT: 12.5 % (ref 11.5–14.5)
GFR SERPL CREATININE-BSD FRML MDRD: 69 ML/MIN/1.73
GLUCOSE BLD-MCNC: 240 MG/DL (ref 65–99)
HCT VFR BLD AUTO: 41.3 % (ref 40.4–52.2)
HGB BLD-MCNC: 14.3 G/DL (ref 13.7–17.6)
MCH RBC QN AUTO: 32.5 PG (ref 27–32.7)
MCHC RBC AUTO-ENTMCNC: 34.6 G/DL (ref 32.6–36.4)
MCV RBC AUTO: 93.9 FL (ref 79.8–96.2)
PLATELET # BLD AUTO: 162 10*3/MM3 (ref 140–500)
PMV BLD AUTO: 10.4 FL (ref 6–12)
POTASSIUM BLD-SCNC: 4 MMOL/L (ref 3.5–5.2)
RBC # BLD AUTO: 4.4 10*6/MM3 (ref 4.6–6)
SODIUM BLD-SCNC: 138 MMOL/L (ref 136–145)
WBC NRBC COR # BLD: 4.56 10*3/MM3 (ref 4.5–10.7)

## 2017-05-11 PROCEDURE — 85027 COMPLETE CBC AUTOMATED: CPT | Performed by: OPHTHALMOLOGY

## 2017-05-11 PROCEDURE — 80048 BASIC METABOLIC PNL TOTAL CA: CPT | Performed by: OPHTHALMOLOGY

## 2017-05-11 PROCEDURE — 36415 COLL VENOUS BLD VENIPUNCTURE: CPT

## 2017-05-18 ENCOUNTER — ANESTHESIA (OUTPATIENT)
Dept: PERIOP | Facility: HOSPITAL | Age: 62
End: 2017-05-18

## 2017-05-18 ENCOUNTER — ANESTHESIA EVENT (OUTPATIENT)
Dept: PERIOP | Facility: HOSPITAL | Age: 62
End: 2017-05-18

## 2017-05-18 ENCOUNTER — HOSPITAL ENCOUNTER (OUTPATIENT)
Facility: HOSPITAL | Age: 62
Setting detail: HOSPITAL OUTPATIENT SURGERY
Discharge: HOME | End: 2017-05-18
Attending: OPHTHALMOLOGY | Admitting: OPHTHALMOLOGY

## 2017-05-18 VITALS
HEART RATE: 66 BPM | BODY MASS INDEX: 25.52 KG/M2 | RESPIRATION RATE: 16 BRPM | OXYGEN SATURATION: 100 % | TEMPERATURE: 97.9 F | SYSTOLIC BLOOD PRESSURE: 144 MMHG | DIASTOLIC BLOOD PRESSURE: 98 MMHG | WEIGHT: 183 LBS

## 2017-05-18 DIAGNOSIS — Z85.89 HISTORY OF SQUAMOUS CELL CARCINOMA: ICD-10-CM

## 2017-05-18 LAB
GLUCOSE BLDC GLUCOMTR-MCNC: 119 MG/DL (ref 70–130)
GLUCOSE BLDC GLUCOMTR-MCNC: 133 MG/DL (ref 70–130)
GLUCOSE BLDC GLUCOMTR-MCNC: 193 MG/DL (ref 70–130)

## 2017-05-18 PROCEDURE — 25010000002 PROPOFOL 10 MG/ML EMULSION: Performed by: NURSE ANESTHETIST, CERTIFIED REGISTERED

## 2017-05-18 PROCEDURE — 25010000002 MIDAZOLAM PER 1 MG: Performed by: ANESTHESIOLOGY

## 2017-05-18 PROCEDURE — 25010000002 ONDANSETRON PER 1 MG: Performed by: NURSE ANESTHETIST, CERTIFIED REGISTERED

## 2017-05-18 PROCEDURE — 25010000002 FENTANYL CITRATE (PF) 100 MCG/2ML SOLUTION: Performed by: NURSE ANESTHETIST, CERTIFIED REGISTERED

## 2017-05-18 PROCEDURE — 88305 TISSUE EXAM BY PATHOLOGIST: CPT | Performed by: OPHTHALMOLOGY

## 2017-05-18 PROCEDURE — 25010000002 FENTANYL CITRATE (PF) 100 MCG/2ML SOLUTION: Performed by: ANESTHESIOLOGY

## 2017-05-18 PROCEDURE — 82962 GLUCOSE BLOOD TEST: CPT

## 2017-05-18 DEVICE — SEALANT FIBRIN TISSEEL FZ 4ML: Type: IMPLANTABLE DEVICE | Site: EYELID | Status: FUNCTIONAL

## 2017-05-18 DEVICE — IMPLANTABLE DEVICE: Type: IMPLANTABLE DEVICE | Site: EYELID | Status: FUNCTIONAL

## 2017-05-18 RX ORDER — OXYCODONE HYDROCHLORIDE AND ACETAMINOPHEN 5; 325 MG/1; MG/1
1 TABLET ORAL ONCE AS NEEDED
Status: DISCONTINUED | OUTPATIENT
Start: 2017-05-18 | End: 2017-05-18 | Stop reason: HOSPADM

## 2017-05-18 RX ORDER — NALOXONE HCL 0.4 MG/ML
0.4 VIAL (ML) INJECTION AS NEEDED
Status: DISCONTINUED | OUTPATIENT
Start: 2017-05-18 | End: 2017-05-18 | Stop reason: HOSPADM

## 2017-05-18 RX ORDER — FAMOTIDINE 10 MG/ML
20 INJECTION, SOLUTION INTRAVENOUS ONCE
Status: COMPLETED | OUTPATIENT
Start: 2017-05-18 | End: 2017-05-18

## 2017-05-18 RX ORDER — PROMETHAZINE HYDROCHLORIDE 25 MG/1
25 TABLET ORAL ONCE AS NEEDED
Status: DISCONTINUED | OUTPATIENT
Start: 2017-05-18 | End: 2017-05-18 | Stop reason: HOSPADM

## 2017-05-18 RX ORDER — TETRACAINE HYDROCHLORIDE 5 MG/ML
SOLUTION OPHTHALMIC AS NEEDED
Status: DISCONTINUED | OUTPATIENT
Start: 2017-05-18 | End: 2017-05-18 | Stop reason: HOSPADM

## 2017-05-18 RX ORDER — ACETAMINOPHEN 650 MG/1
650 SUPPOSITORY RECTAL ONCE AS NEEDED
Status: DISCONTINUED | OUTPATIENT
Start: 2017-05-18 | End: 2017-05-18 | Stop reason: HOSPADM

## 2017-05-18 RX ORDER — SODIUM CHLORIDE, SODIUM LACTATE, POTASSIUM CHLORIDE, CALCIUM CHLORIDE 600; 310; 30; 20 MG/100ML; MG/100ML; MG/100ML; MG/100ML
9 INJECTION, SOLUTION INTRAVENOUS CONTINUOUS
Status: DISCONTINUED | OUTPATIENT
Start: 2017-05-18 | End: 2017-05-18 | Stop reason: HOSPADM

## 2017-05-18 RX ORDER — ERYTHROMYCIN 5 MG/G
OINTMENT OPHTHALMIC
Qty: 1 EACH | Refills: 1 | Status: SHIPPED | OUTPATIENT
Start: 2017-05-18 | End: 2018-07-23

## 2017-05-18 RX ORDER — NALBUPHINE HCL 10 MG/ML
2 AMPUL (ML) INJECTION EVERY 4 HOURS PRN
Status: DISCONTINUED | OUTPATIENT
Start: 2017-05-18 | End: 2017-05-18 | Stop reason: HOSPADM

## 2017-05-18 RX ORDER — ERYTHROMYCIN 5 MG/G
OINTMENT OPHTHALMIC AS NEEDED
Status: DISCONTINUED | OUTPATIENT
Start: 2017-05-18 | End: 2017-05-18 | Stop reason: HOSPADM

## 2017-05-18 RX ORDER — HYDROCODONE BITARTRATE AND ACETAMINOPHEN 5; 325 MG/1; MG/1
1 TABLET ORAL EVERY 4 HOURS PRN
Qty: 15 TABLET | Refills: 0 | Status: SHIPPED | OUTPATIENT
Start: 2017-05-18 | End: 2018-07-23

## 2017-05-18 RX ORDER — SODIUM CHLORIDE 0.9 % (FLUSH) 0.9 %
1-10 SYRINGE (ML) INJECTION AS NEEDED
Status: DISCONTINUED | OUTPATIENT
Start: 2017-05-18 | End: 2017-05-18 | Stop reason: HOSPADM

## 2017-05-18 RX ORDER — FENTANYL CITRATE 50 UG/ML
50 INJECTION, SOLUTION INTRAMUSCULAR; INTRAVENOUS
Status: DISCONTINUED | OUTPATIENT
Start: 2017-05-18 | End: 2017-05-18 | Stop reason: HOSPADM

## 2017-05-18 RX ORDER — DIPHENHYDRAMINE HYDROCHLORIDE 50 MG/ML
12.5 INJECTION INTRAMUSCULAR; INTRAVENOUS
Status: DISCONTINUED | OUTPATIENT
Start: 2017-05-18 | End: 2017-05-18 | Stop reason: HOSPADM

## 2017-05-18 RX ORDER — ACETAMINOPHEN 325 MG/1
650 TABLET ORAL ONCE AS NEEDED
Status: DISCONTINUED | OUTPATIENT
Start: 2017-05-18 | End: 2017-05-18 | Stop reason: HOSPADM

## 2017-05-18 RX ORDER — HYDROMORPHONE HYDROCHLORIDE 1 MG/ML
0.25 INJECTION, SOLUTION INTRAMUSCULAR; INTRAVENOUS; SUBCUTANEOUS
Status: DISCONTINUED | OUTPATIENT
Start: 2017-05-18 | End: 2017-05-18 | Stop reason: HOSPADM

## 2017-05-18 RX ORDER — ONDANSETRON 2 MG/ML
INJECTION INTRAMUSCULAR; INTRAVENOUS AS NEEDED
Status: DISCONTINUED | OUTPATIENT
Start: 2017-05-18 | End: 2017-05-18 | Stop reason: SURG

## 2017-05-18 RX ORDER — HYDROCODONE BITARTRATE AND ACETAMINOPHEN 5; 325 MG/1; MG/1
1 TABLET ORAL ONCE AS NEEDED
Status: DISCONTINUED | OUTPATIENT
Start: 2017-05-18 | End: 2017-05-18 | Stop reason: HOSPADM

## 2017-05-18 RX ORDER — NALBUPHINE HCL 10 MG/ML
10 AMPUL (ML) INJECTION EVERY 4 HOURS PRN
Status: DISCONTINUED | OUTPATIENT
Start: 2017-05-18 | End: 2017-05-18 | Stop reason: HOSPADM

## 2017-05-18 RX ORDER — PROMETHAZINE HYDROCHLORIDE 25 MG/1
25 SUPPOSITORY RECTAL ONCE AS NEEDED
Status: DISCONTINUED | OUTPATIENT
Start: 2017-05-18 | End: 2017-05-18 | Stop reason: HOSPADM

## 2017-05-18 RX ORDER — FENTANYL CITRATE 50 UG/ML
INJECTION, SOLUTION INTRAMUSCULAR; INTRAVENOUS AS NEEDED
Status: DISCONTINUED | OUTPATIENT
Start: 2017-05-18 | End: 2017-05-18 | Stop reason: SURG

## 2017-05-18 RX ORDER — MIDAZOLAM HYDROCHLORIDE 1 MG/ML
2 INJECTION INTRAMUSCULAR; INTRAVENOUS
Status: DISCONTINUED | OUTPATIENT
Start: 2017-05-18 | End: 2017-05-18 | Stop reason: HOSPADM

## 2017-05-18 RX ORDER — ACETAMINOPHEN 325 MG/1
650 TABLET ORAL ONCE
Status: COMPLETED | OUTPATIENT
Start: 2017-05-18 | End: 2017-05-18

## 2017-05-18 RX ORDER — PROMETHAZINE HYDROCHLORIDE 25 MG/ML
6.25 INJECTION, SOLUTION INTRAMUSCULAR; INTRAVENOUS ONCE AS NEEDED
Status: DISCONTINUED | OUTPATIENT
Start: 2017-05-18 | End: 2017-05-18 | Stop reason: HOSPADM

## 2017-05-18 RX ORDER — LIDOCAINE HYDROCHLORIDE 20 MG/ML
INJECTION, SOLUTION INFILTRATION; PERINEURAL AS NEEDED
Status: DISCONTINUED | OUTPATIENT
Start: 2017-05-18 | End: 2017-05-18 | Stop reason: SURG

## 2017-05-18 RX ORDER — MIDAZOLAM HYDROCHLORIDE 1 MG/ML
1 INJECTION INTRAMUSCULAR; INTRAVENOUS
Status: DISCONTINUED | OUTPATIENT
Start: 2017-05-18 | End: 2017-05-18 | Stop reason: HOSPADM

## 2017-05-18 RX ORDER — HYDRALAZINE HYDROCHLORIDE 20 MG/ML
5 INJECTION INTRAMUSCULAR; INTRAVENOUS
Status: DISCONTINUED | OUTPATIENT
Start: 2017-05-18 | End: 2017-05-18 | Stop reason: HOSPADM

## 2017-05-18 RX ORDER — PROPOFOL 10 MG/ML
VIAL (ML) INTRAVENOUS AS NEEDED
Status: DISCONTINUED | OUTPATIENT
Start: 2017-05-18 | End: 2017-05-18 | Stop reason: SURG

## 2017-05-18 RX ORDER — MAGNESIUM HYDROXIDE 1200 MG/15ML
LIQUID ORAL AS NEEDED
Status: DISCONTINUED | OUTPATIENT
Start: 2017-05-18 | End: 2017-05-18 | Stop reason: HOSPADM

## 2017-05-18 RX ADMIN — SODIUM CHLORIDE, POTASSIUM CHLORIDE, SODIUM LACTATE AND CALCIUM CHLORIDE 9 ML/HR: 600; 310; 30; 20 INJECTION, SOLUTION INTRAVENOUS at 11:37

## 2017-05-18 RX ADMIN — MIDAZOLAM 1 MG: 1 INJECTION INTRAMUSCULAR; INTRAVENOUS at 11:45

## 2017-05-18 RX ADMIN — HYDROCODONE BITARTRATE AND ACETAMINOPHEN 1 TABLET: 5; 325 TABLET ORAL at 16:05

## 2017-05-18 RX ADMIN — MIDAZOLAM 1 MG: 1 INJECTION INTRAMUSCULAR; INTRAVENOUS at 11:52

## 2017-05-18 RX ADMIN — PROPOFOL 200 MG: 10 INJECTION, EMULSION INTRAVENOUS at 14:42

## 2017-05-18 RX ADMIN — PROPOFOL 50 MG: 10 INJECTION, EMULSION INTRAVENOUS at 13:50

## 2017-05-18 RX ADMIN — FENTANYL CITRATE 50 MCG: 50 INJECTION INTRAMUSCULAR; INTRAVENOUS at 16:04

## 2017-05-18 RX ADMIN — LIDOCAINE HYDROCHLORIDE 40 MG: 20 INJECTION, SOLUTION INFILTRATION; PERINEURAL at 13:47

## 2017-05-18 RX ADMIN — PROPOFOL 50 MG: 10 INJECTION, EMULSION INTRAVENOUS at 13:47

## 2017-05-18 RX ADMIN — FENTANYL CITRATE 50 MCG: 50 INJECTION INTRAMUSCULAR; INTRAVENOUS at 14:53

## 2017-05-18 RX ADMIN — FENTANYL CITRATE 25 MCG: 50 INJECTION INTRAMUSCULAR; INTRAVENOUS at 13:50

## 2017-05-18 RX ADMIN — ONDANSETRON 4 MG: 2 INJECTION INTRAMUSCULAR; INTRAVENOUS at 15:25

## 2017-05-18 RX ADMIN — FAMOTIDINE 20 MG: 10 INJECTION, SOLUTION INTRAVENOUS at 11:45

## 2017-05-18 RX ADMIN — SODIUM CHLORIDE, POTASSIUM CHLORIDE, SODIUM LACTATE AND CALCIUM CHLORIDE 9 ML/HR: 600; 310; 30; 20 INJECTION, SOLUTION INTRAVENOUS at 16:07

## 2017-05-18 RX ADMIN — FENTANYL CITRATE 25 MCG: 50 INJECTION INTRAMUSCULAR; INTRAVENOUS at 14:50

## 2017-05-18 RX ADMIN — ACETAMINOPHEN 650 MG: 325 TABLET ORAL at 11:45

## 2017-05-18 RX ADMIN — FENTANYL CITRATE 50 MCG: 50 INJECTION INTRAMUSCULAR; INTRAVENOUS at 16:15

## 2017-05-20 LAB
CYTO UR: NORMAL
LAB AP CASE REPORT: NORMAL
Lab: NORMAL
PATH REPORT.FINAL DX SPEC: NORMAL
PATH REPORT.GROSS SPEC: NORMAL

## 2017-06-16 ENCOUNTER — HOSPITAL ENCOUNTER (INPATIENT)
Facility: HOSPITAL | Age: 62
LOS: 1 days | Discharge: HOME | DRG: 389 | End: 2017-06-17
Attending: EMERGENCY MEDICINE | Admitting: INTERNAL MEDICINE
Payer: COMMERCIAL

## 2017-06-16 ENCOUNTER — APPOINTMENT (OUTPATIENT)
Dept: CT IMAGING | Facility: HOSPITAL | Age: 62
DRG: 389 | End: 2017-06-16
Attending: EMERGENCY MEDICINE
Payer: COMMERCIAL

## 2017-06-16 DIAGNOSIS — K56.609 SMALL BOWEL OBSTRUCTION (HCC): ICD-10-CM

## 2017-06-16 DIAGNOSIS — R10.9 ABDOMINAL PAIN, ACUTE: Primary | ICD-10-CM

## 2017-06-16 PROCEDURE — 74176 CT ABD & PELVIS W/O CONTRAST: CPT | Performed by: EMERGENCY MEDICINE

## 2017-06-16 PROCEDURE — 99254 IP/OBS CNSLTJ NEW/EST MOD 60: CPT | Performed by: SURGERY

## 2017-06-16 RX ORDER — ONDANSETRON 4 MG/1
4 TABLET, ORALLY DISINTEGRATING ORAL EVERY 4 HOURS PRN
Qty: 15 TABLET | Refills: 0 | Status: SHIPPED | OUTPATIENT
Start: 2017-06-16 | End: 2018-02-07 | Stop reason: ALTCHOICE

## 2017-06-16 RX ORDER — MORPHINE SULFATE 4 MG/ML
4 INJECTION, SOLUTION INTRAMUSCULAR; INTRAVENOUS ONCE
Status: COMPLETED | OUTPATIENT
Start: 2017-06-16 | End: 2017-06-16

## 2017-06-16 RX ORDER — ROSUVASTATIN CALCIUM 5 MG/1
5 TABLET, COATED ORAL NIGHTLY
COMMUNITY

## 2017-06-16 RX ORDER — MORPHINE SULFATE 2 MG/ML
2 INJECTION, SOLUTION INTRAMUSCULAR; INTRAVENOUS EVERY 4 HOURS PRN
Status: DISCONTINUED | OUTPATIENT
Start: 2017-06-16 | End: 2017-06-17

## 2017-06-16 RX ORDER — ONDANSETRON 2 MG/ML
4 INJECTION INTRAMUSCULAR; INTRAVENOUS EVERY 4 HOURS PRN
Status: DISCONTINUED | OUTPATIENT
Start: 2017-06-16 | End: 2017-06-17

## 2017-06-16 RX ORDER — MORPHINE SULFATE 2 MG/ML
2 INJECTION, SOLUTION INTRAMUSCULAR; INTRAVENOUS ONCE
Status: COMPLETED | OUTPATIENT
Start: 2017-06-16 | End: 2017-06-16

## 2017-06-16 RX ORDER — SODIUM CHLORIDE 9 MG/ML
INJECTION, SOLUTION INTRAVENOUS CONTINUOUS
Status: DISCONTINUED | OUTPATIENT
Start: 2017-06-16 | End: 2017-06-17

## 2017-06-16 RX ORDER — ONDANSETRON 2 MG/ML
4 INJECTION INTRAMUSCULAR; INTRAVENOUS ONCE
Status: COMPLETED | OUTPATIENT
Start: 2017-06-16 | End: 2017-06-16

## 2017-06-16 RX ORDER — HYDROCODONE BITARTRATE AND ACETAMINOPHEN 5; 325 MG/1; MG/1
1-2 TABLET ORAL EVERY 6 HOURS PRN
Qty: 15 TABLET | Refills: 0 | Status: SHIPPED | OUTPATIENT
Start: 2017-06-16 | End: 2018-02-07 | Stop reason: ALTCHOICE

## 2017-06-16 NOTE — ED INITIAL ASSESSMENT (HPI)
C/o right sided abd pain that radiates around to the back since last night with nausea.   Hx of bowel obstruction and pancreatitis in the past.

## 2017-06-16 NOTE — ED PROVIDER NOTES
Patient received in signout from Dr. Simran Castellon, awaits CT results. Patient with a history of multiple abdominal surgeries, complaining of right abdominal pain.     CT a/p CONCLUSION:  Small bowel obstruction with probable transition point within the anterior

## 2017-06-16 NOTE — ED PROVIDER NOTES
Patient Seen in: [facility] AND [facility] Emergency Department    History   Patient presents with:  Abdomen/Flank Pain (GI/)      HPI    Patient presents complaining of right-sided abdominal pain that started last night and worsened today.   Pain wraps around for cough and shortness of breath. Cardiovascular: Negative for chest pain and palpitations. Gastrointestinal: Positive for nausea and abdominal pain. Negative for vomiting. Genitourinary: Negative for dysuria and hematuria.    Musculoskeletal: Negat -American 45 (*)     All other components within normal limits   HEPATIC FUNCTION PANEL (7) - Abnormal; Notable for the following:     Alkaline Phosphatase 113 (*)     Albumin 4.9 (*)     All other components within normal limits   CBC W/ DIFFERENTI doctor    Schedule an appointment as soon as possible for a visit in 3 days        Medications Prescribed:  Current Discharge Medication List    START taking these medications    HYDROcodone-acetaminophen 5-325 MG Oral Tab  Take 1-2 tablets by mouth every

## 2017-06-17 VITALS
BODY MASS INDEX: 24.34 KG/M2 | HEART RATE: 96 BPM | WEIGHT: 170 LBS | OXYGEN SATURATION: 96 % | RESPIRATION RATE: 16 BRPM | HEIGHT: 70 IN | DIASTOLIC BLOOD PRESSURE: 59 MMHG | SYSTOLIC BLOOD PRESSURE: 103 MMHG | TEMPERATURE: 98 F

## 2017-06-17 PROCEDURE — 99231 SBSQ HOSP IP/OBS SF/LOW 25: CPT | Performed by: SURGERY

## 2017-06-17 RX ORDER — IBUPROFEN 400 MG/1
400 TABLET ORAL EVERY 6 HOURS PRN
Status: DISCONTINUED | OUTPATIENT
Start: 2017-06-17 | End: 2017-06-17

## 2017-06-17 RX ORDER — SODIUM CHLORIDE 9 MG/ML
INJECTION, SOLUTION INTRAVENOUS
Status: COMPLETED
Start: 2017-06-17 | End: 2017-06-17

## 2017-06-17 RX ORDER — 0.9 % SODIUM CHLORIDE 0.9 %
VIAL (ML) INJECTION
Status: DISCONTINUED
Start: 2017-06-17 | End: 2017-06-17 | Stop reason: WASHOUT

## 2017-06-17 NOTE — PLAN OF CARE
DISCHARGE PLANNING    • Discharge to home or other facility with appropriate resources Progressing    Plan to be discharged home once bowel obstruction resolves. Pt denies pain, having loose bm's.     PAIN - ADULT    • Verbalizes/displays adequate comfort l

## 2017-06-17 NOTE — PLAN OF CARE
DISCHARGE PLANNING    • Discharge to home or other facility with appropriate resources Adequate for Discharge    ADVANCED TO SOFT DIET, MOTRIN FOR PAIN. PLAN FOR DISCHARGE HOME TODAY.     PAIN - ADULT    • Verbalizes/displays adequate comfort level or patie

## 2017-06-17 NOTE — PROGRESS NOTES
[facility]    Progress Note    [facility] Patient Status:  Inpatient    3/16/1955 MRN [id number]   Location [facility] 4W/SW/SE Attending Ariana Day # 1 PCP PHYSICIAN NONSTAFF     Assessment and Plan Noreen Monroy MD  6/17/2017

## 2017-06-17 NOTE — CONSULTS
[facility]    PATIENT'S NAME: Trung Mejia   ATTENDING PHYSICIAN: Mily Gomez MD   CONSULTING PHYSICIAN: Nida Hastings MD   PATIENT ACCOUNT#:   [id number]    LOCATION:  [address] [place] [address] [address] #:   [id number]       DATE OF BIR reversal, bone marrow transplant, laparotomy and bowel resection for lymphoma, laparotomy for recurrent lymphoma, repeat laparotomy for lymphoma, tonsillectomy, adenoidectomy, vasectomy, bilateral cataract extraction.     MEDICATIONS:  Allopurinol, amlodipi and surgical history.   He has some chronic gastrointestinal tract irregularity, having undergone multiple surgeries for lymphoma as well as radiation therapy and chemotherapy in the past.  There are some chronic changes on his imaging, but perhaps a [place]

## 2017-06-17 NOTE — H&P
[facility]    PATIENT'S NAME: Neal Gonzalez   ATTENDING PHYSICIAN: Debbi Leiva.  Usman Coppola MD   PATIENT ACCOUNT#:   [id number]    LOCATION:  [place] [address]  MEDICAL RECORD #:   [id number]       YOB: 1955  ADMISSION DATE:       06/1 VITAL SIGNS:  Blood pressure 139/77, pulse 94. HEENT:  Normocephalic. Pupils are reactive to light. NECK:  Supple. No JVD. LUNGS:  Good air entry bilaterally. HEART:  S1, S2 is heard normally. ABDOMEN:  Soft.   Multiple healed scars on th

## 2017-06-17 NOTE — PROGRESS NOTES
[facility]    Progress Note    Lajoyce Forward Patient Status:  Inpatient    3/16/1955 MRN [id number]   Location [facility] 4W/SW/SE Attending Ariana Day # 1 PCP PHYSICIAN NONSTAFF       Subjective:   Pierce bowel obstruction with probable transition point within the anterior midline abdomen. Evaluation is otherwise limited due to lack of oral contrast.  Mild nonspecific inflammation within the right lower quadrant. No discrete mass or loculated collection.   H

## 2017-06-19 NOTE — PAYOR COMM NOTE
--------------  ADMISSION REVIEW     Payor: [address] #:  [id number]  Authorization Number: [id number]    Admit date: 6/16/2017  1:08 PM       Patient Seen in: [facility] Emergency Department    History   Andreas mmHg   Pulse 06/16/17 1242 111   Resp 06/16/17 1242 24   Temp 06/16/17 1242 97.8 °F (36.6 °C)   Temp src 06/16/17 1242 Temporal   SpO2 06/16/17 1242 100 %   O2 Device 06/16/17 1242 None (Room air)       Physical Exam   Constitutional: He is oriented to per nausea. Nonsurgical abdomen on examination, patient states pain is not as bad as prior episodes of symptoms. Pain resolved in the ED with morphine. Laboratory testing unremarkable other than trace leukocytosis and chronic renal insufficiency.   Plan for alcohol. Denies any recreational drugs. REVIEW OF SYSTEMS:  Denies any chest pain, shortness of breath, palpitations. Denies any headache, dizziness or syncope. PHYSICAL EXAMINATION:    GENERAL:  Awake and alert.     VITAL SIGNS:  Blood pressu

## 2017-11-21 ENCOUNTER — HOSPITAL ENCOUNTER (OUTPATIENT)
Age: 62
Discharge: HOME | End: 2017-11-21
Attending: EMERGENCY MEDICINE
Payer: COMMERCIAL

## 2017-11-21 ENCOUNTER — APPOINTMENT (OUTPATIENT)
Dept: GENERAL RADIOLOGY | Age: 62
End: 2017-11-21
Attending: EMERGENCY MEDICINE
Payer: COMMERCIAL

## 2017-11-21 VITALS
WEIGHT: 170 LBS | OXYGEN SATURATION: 99 % | RESPIRATION RATE: 18 BRPM | HEIGHT: 70 IN | BODY MASS INDEX: 24.34 KG/M2 | HEART RATE: 107 BPM | DIASTOLIC BLOOD PRESSURE: 77 MMHG | TEMPERATURE: 98 F | SYSTOLIC BLOOD PRESSURE: 120 MMHG

## 2017-11-21 DIAGNOSIS — H66.92 ACUTE LEFT OTITIS MEDIA: Primary | ICD-10-CM

## 2017-11-21 DIAGNOSIS — J01.90 ACUTE NON-RECURRENT SINUSITIS, UNSPECIFIED LOCATION: ICD-10-CM

## 2017-11-21 DIAGNOSIS — H10.31 ACUTE BACTERIAL CONJUNCTIVITIS OF RIGHT EYE: ICD-10-CM

## 2017-11-21 PROCEDURE — 99214 OFFICE O/P EST MOD 30 MIN: CPT

## 2017-11-21 PROCEDURE — 71020 XR CHEST PA + LAT CHEST (CPT=71020): CPT | Performed by: EMERGENCY MEDICINE

## 2017-11-21 PROCEDURE — 99213 OFFICE O/P EST LOW 20 MIN: CPT

## 2017-11-21 RX ORDER — AMOXICILLIN AND CLAVULANATE POTASSIUM 875; 125 MG/1; MG/1
1 TABLET, FILM COATED ORAL 2 TIMES DAILY
Qty: 20 TABLET | Refills: 0 | Status: SHIPPED | OUTPATIENT
Start: 2017-11-21 | End: 2017-12-01

## 2017-11-21 RX ORDER — PREDNISONE 20 MG/1
40 TABLET ORAL DAILY
Qty: 6 TABLET | Refills: 0 | Status: SHIPPED | OUTPATIENT
Start: 2017-11-21 | End: 2017-11-24

## 2017-11-21 NOTE — ED PROVIDER NOTES
Patient Seen in: [facility] In [address] [place] [address]    History   Patient presents with:  Cough/URI  [facility]    Stated Complaint: cough, pink eye    HPI    The patient's a 27-year-old male with a history of Hodgkin's lymphoma ×3, colon cancer, src: Oral  SpO2: 99 %  O2 Device: None (Room air)    Current:/77   Pulse 107   Temp 98.4 °F (36.9 °C) (Oral)   Resp 18   Ht 177.8 cm (5' 10\")   Wt 77.1 kg   SpO2 99%   BMI 24.39 kg/m²         Physical Exam    Alert male no acute distress  HEENT: Nor mouth 2 (two) times daily. Qty: 20 tablet Refills: 0    predniSONE 20 MG Oral Tab  Take 2 tablets (40 mg total) by mouth daily.   Qty: 6 tablet Refills: 0

## 2017-11-21 NOTE — ED INITIAL ASSESSMENT (HPI)
Pt reporting productive cough and post nasal drip for past 2 weeks. Reporting thick discolored sputum. Denies shortness of breath or chest discomfort. Eye redness and drainage from right eye. Denies changes in vision. Dr. Cherelle Sanders at bedside assessing pt.

## 2017-12-19 ENCOUNTER — HOSPITAL ENCOUNTER (INPATIENT)
Facility: HOSPITAL | Age: 62
LOS: 1 days | Discharge: HOME | DRG: 390 | End: 2017-12-21
Attending: EMERGENCY MEDICINE | Admitting: HOSPITALIST
Payer: COMMERCIAL

## 2017-12-19 DIAGNOSIS — K56.609 SMALL BOWEL OBSTRUCTION (HCC): Primary | ICD-10-CM

## 2017-12-20 ENCOUNTER — APPOINTMENT (OUTPATIENT)
Dept: GENERAL RADIOLOGY | Facility: HOSPITAL | Age: 62
DRG: 390 | End: 2017-12-20
Attending: HOSPITALIST
Payer: COMMERCIAL

## 2017-12-20 ENCOUNTER — APPOINTMENT (OUTPATIENT)
Dept: CT IMAGING | Facility: HOSPITAL | Age: 62
DRG: 390 | End: 2017-12-20
Attending: EMERGENCY MEDICINE
Payer: COMMERCIAL

## 2017-12-20 PROCEDURE — 74176 CT ABD & PELVIS W/O CONTRAST: CPT | Performed by: EMERGENCY MEDICINE

## 2017-12-20 PROCEDURE — 99254 IP/OBS CNSLTJ NEW/EST MOD 60: CPT | Performed by: SURGERY

## 2017-12-20 PROCEDURE — 74020 XR ABDOMEN, OBSTRUCTIVE SERIES (CPT=74020): CPT | Performed by: HOSPITALIST

## 2017-12-20 PROCEDURE — 99223 1ST HOSP IP/OBS HIGH 75: CPT | Performed by: HOSPITALIST

## 2017-12-20 RX ORDER — MORPHINE SULFATE 4 MG/ML
4 INJECTION, SOLUTION INTRAMUSCULAR; INTRAVENOUS EVERY 2 HOUR PRN
Status: DISCONTINUED | OUTPATIENT
Start: 2017-12-20 | End: 2017-12-21

## 2017-12-20 RX ORDER — SODIUM CHLORIDE 9 MG/ML
INJECTION, SOLUTION INTRAVENOUS CONTINUOUS
Status: DISCONTINUED | OUTPATIENT
Start: 2017-12-20 | End: 2017-12-21

## 2017-12-20 RX ORDER — HEPARIN SODIUM 5000 [USP'U]/ML
5000 INJECTION, SOLUTION INTRAVENOUS; SUBCUTANEOUS EVERY 12 HOURS SCHEDULED
Status: DISCONTINUED | OUTPATIENT
Start: 2017-12-20 | End: 2017-12-21

## 2017-12-20 RX ORDER — HYDROMORPHONE HYDROCHLORIDE 1 MG/ML
0.5 INJECTION, SOLUTION INTRAMUSCULAR; INTRAVENOUS; SUBCUTANEOUS EVERY 30 MIN PRN
Status: ACTIVE | OUTPATIENT
Start: 2017-12-20 | End: 2017-12-20

## 2017-12-20 RX ORDER — ONDANSETRON 2 MG/ML
INJECTION INTRAMUSCULAR; INTRAVENOUS
Status: COMPLETED
Start: 2017-12-20 | End: 2017-12-20

## 2017-12-20 RX ORDER — ONDANSETRON 2 MG/ML
4 INJECTION INTRAMUSCULAR; INTRAVENOUS EVERY 6 HOURS PRN
Status: DISCONTINUED | OUTPATIENT
Start: 2017-12-20 | End: 2017-12-21

## 2017-12-20 RX ORDER — SODIUM CHLORIDE 9 MG/ML
INJECTION, SOLUTION INTRAVENOUS CONTINUOUS
Status: ACTIVE | OUTPATIENT
Start: 2017-12-20 | End: 2017-12-20

## 2017-12-20 RX ORDER — HYDROMORPHONE HYDROCHLORIDE 1 MG/ML
0.3 INJECTION, SOLUTION INTRAMUSCULAR; INTRAVENOUS; SUBCUTANEOUS
Status: DISCONTINUED | OUTPATIENT
Start: 2017-12-20 | End: 2017-12-20

## 2017-12-20 RX ORDER — ONDANSETRON 2 MG/ML
4 INJECTION INTRAMUSCULAR; INTRAVENOUS EVERY 4 HOURS PRN
Status: DISCONTINUED | OUTPATIENT
Start: 2017-12-20 | End: 2017-12-21

## 2017-12-20 RX ORDER — HYDROMORPHONE HYDROCHLORIDE 1 MG/ML
0.3 INJECTION, SOLUTION INTRAMUSCULAR; INTRAVENOUS; SUBCUTANEOUS EVERY 4 HOURS PRN
Status: DISCONTINUED | OUTPATIENT
Start: 2017-12-20 | End: 2017-12-20

## 2017-12-20 RX ORDER — MIDAZOLAM HYDROCHLORIDE 5 MG/ML
2.5 INJECTION INTRAMUSCULAR; INTRAVENOUS ONCE
Status: COMPLETED | OUTPATIENT
Start: 2017-12-20 | End: 2017-12-20

## 2017-12-20 RX ORDER — HYDROMORPHONE HYDROCHLORIDE 1 MG/ML
0.5 INJECTION, SOLUTION INTRAMUSCULAR; INTRAVENOUS; SUBCUTANEOUS EVERY 4 HOURS PRN
Status: DISCONTINUED | OUTPATIENT
Start: 2017-12-20 | End: 2017-12-20

## 2017-12-20 RX ORDER — MORPHINE SULFATE 2 MG/ML
2 INJECTION, SOLUTION INTRAMUSCULAR; INTRAVENOUS EVERY 2 HOUR PRN
Status: DISCONTINUED | OUTPATIENT
Start: 2017-12-20 | End: 2017-12-21

## 2017-12-20 RX ORDER — HYDROMORPHONE HYDROCHLORIDE 1 MG/ML
0.5 INJECTION, SOLUTION INTRAMUSCULAR; INTRAVENOUS; SUBCUTANEOUS
Status: DISCONTINUED | OUTPATIENT
Start: 2017-12-20 | End: 2017-12-20

## 2017-12-20 RX ORDER — DEXTROSE MONOHYDRATE 25 G/50ML
50 INJECTION, SOLUTION INTRAVENOUS AS NEEDED
Status: DISCONTINUED | OUTPATIENT
Start: 2017-12-20 | End: 2017-12-21

## 2017-12-20 RX ORDER — SODIUM CHLORIDE 0.9 % (FLUSH) 0.9 %
3 SYRINGE (ML) INJECTION AS NEEDED
Status: DISCONTINUED | OUTPATIENT
Start: 2017-12-20 | End: 2017-12-21

## 2017-12-20 RX ORDER — MORPHINE SULFATE 4 MG/ML
8 INJECTION, SOLUTION INTRAMUSCULAR; INTRAVENOUS ONCE
Status: COMPLETED | OUTPATIENT
Start: 2017-12-20 | End: 2017-12-20

## 2017-12-20 NOTE — PROGRESS NOTES
ADMISSION NOTE    58year old male  With h/o NHL and colon cancer  And multiple admissions for SBO presents with abdominal pain CT showed sbo . Available medical records partially reviewed. Dictation to follow.     Chandler Brady M.D.  12/20/2017

## 2017-12-20 NOTE — PAYOR COMM NOTE
--------------  ADMISSION REVIEW     Payor: [address] #:  [id number]  Authorization Number: [id number]    Admit date: 12/20/17  Admit time: 0221       Patient Seen in: [facility] Emergency Department    History exhibits no discharge. Left eye exhibits no discharge. Neck: No tracheal deviation present. Cardiovascular: Intact distal pulses. No murmur heard. Pulmonary/Chest: Effort normal and breath sounds normal. No stridor. No respiratory distress.    Abdom Human (NOVOLIN R) 100 UNIT/ML injection 1-5 Units (not administered)   HYDROmorphone HCl (DILAUDID) injection 0.5 mg (not administered)   HYDROmorphone HCl (DILAUDID) injection 0.3 mg (not administered)   sodium chloride 0.9% IV bolus 1,000 mL (0 mL [facility] that a transition point was present in the right lower quadrant proximal to which there was suggestion of small bowel feces sign. Distal small bowel was completely depressed up to the ileocolic anastomosis.   Findings suggesting a relatively high-grade dis floor for further evaluation and treatment. PAST MEDICAL HISTORY:  Non-Hodgkin lymphoma and colon cancer.    He is status post colon resection with ileostomy placement and 6 months later with revision; multiple recurrent small bowel obstructions; hyperte present in the left naris. Mucous membranes were dry. NECK:  Supple. LUNGS:  Essentially clear with easy respiratory excursions. ABDOMEN:  Distended with hypoactive bowel sounds.   There was tenderness to palpation primarily in the right abdomen, parti parenterally. 5.   Deep venous thrombosis prophylaxis. SCDs, subcutaneous heparin. 6.   Gastrointestinal prophylaxis. Protonix. 7.   The patient's current clinical status and proposed treatment plan was discussed with him.   All his questions were answ

## 2017-12-20 NOTE — H&P
[facility]    PATIENT'S NAME: Nando Rosenthal   ATTENDING PHYSICIAN: Abdirashid Richmond MD   PATIENT ACCOUNT#:   [de-identified]    LOCATION:  [address] [place] [address] #:   [id number]       YOB: 1955  ADMISSION DATE: distended with air fluid levels beyond the colocolonic anastomosis in the left abdomen, the colon was relatively decompressed also suggesting a possible low-grade colonic obstruction at the level of the anastomosis.   This written report was not available a fracture,  multiple rib fractures, and bilateral pneumothoraces that responded to oxygen and expectant management; chronic kidney disease in the past, stage 3.     MEDICATIONS:  Prior to admission included allopurinol 300 mg daily; amlodipine 5 mg daily; fu rashes. NEUROLOGIC:  The patient was awake, alert, oriented x3 with no focal neurologic deficits. PSYCHIATRIC:  Mood and affect were pleasant and cooperative.     LABORATORY DATA:  Glucose 170, sodium 138, potassium 4.7, chloride 104, CO2 of 21, BUN 34, c

## 2017-12-20 NOTE — ED PROVIDER NOTES
Patient Seen in: [facility] AND [facility] Emergency Department    History   Patient presents with:  Abdominal Pain      HPI    The patient presents complaining of right lower quadrant abdominal pain with associated nausea and vomiting.   Symptoms started roughly time   Magnesium Oxide 400 (240 MG) MG Oral Tab Take 800 mg by mouth 2 (two) times daily. Disp:  Rfl:  12/19/2017 at Unknown time   HYDROcodone-acetaminophen 5-325 MG Oral Tab Take 1-2 tablets by mouth every 6 (six) hours as needed for Pain.  Disp: 15 tab Left eye exhibits no discharge. Neck: No tracheal deviation present. Cardiovascular: Intact distal pulses. No murmur heard. Pulmonary/Chest: Effort normal and breath sounds normal. No stridor. No respiratory distress. Abdominal: Soft.  He exhibits CBC WITH DIFFERENTIAL WITH PLATELET   HEMOGLOBIN A1C   RAINBOW DRAW BLUE   RAINBOW DRAW LAVENDER   RAINBOW DRAW DARK GREEN   RAINBOW DRAW LIGHT GREEN   RAINBOW DRAW GOLD   RAINBOW DRAW LAVENDER TALL (BNP)         Imaging Results Available and Reviewed wh anastomosis in the left abdomen, the colon is relatively decompressed, with only a small amount of stool. This suggests there could be a low-grade colonic obstruction at the level of the anastomosis, secondary to adhesions or stricture. No free air. vitals.     Pulse Ox: 94%, Room air, Normal     Monitor Interpretation:   normal sinus rhythm    Differential Diagnosis/ Diagnostic Consideration: Small bowel obstruction, intestinal perforation, intra-abdominal infection, electrolyte abnormalities, dehydra

## 2017-12-21 VITALS
HEIGHT: 70 IN | HEART RATE: 96 BPM | WEIGHT: 170 LBS | DIASTOLIC BLOOD PRESSURE: 80 MMHG | OXYGEN SATURATION: 96 % | TEMPERATURE: 99 F | BODY MASS INDEX: 24.34 KG/M2 | RESPIRATION RATE: 18 BRPM | SYSTOLIC BLOOD PRESSURE: 137 MMHG

## 2017-12-21 PROCEDURE — 99239 HOSP IP/OBS DSCHRG MGMT >30: CPT | Performed by: HOSPITALIST

## 2017-12-21 PROCEDURE — 99231 SBSQ HOSP IP/OBS SF/LOW 25: CPT | Performed by: SURGERY

## 2017-12-21 RX ORDER — AMLODIPINE BESYLATE 5 MG/1
5 TABLET ORAL DAILY
Status: DISCONTINUED | OUTPATIENT
Start: 2017-12-21 | End: 2017-12-21

## 2017-12-21 NOTE — PROGRESS NOTES
[facility]  Progress Note     Lindbergh Severance  : 3/16/1955    Status: Inpatient  Day #: 1    Attending: Rigo Shook MD  PCP: PHYSICIAN NONSTAFF      Assessment and Plan     Partial Small Bowel Obstruction  - surgery on consult  - NGT remov 4.4  4.0   CL  104  107  110   CO2  21*  21*  23   GLU  170*  198*  144*   MG   --   1.5*  1.9   BILT  0.8  0.7   --    AST  31  24   --    ALT  27  26   --    ALKPHO  118*  110*   --    TP  7.3  6.7   --    JOSIE   --   112*   --    LIP  25   --    -- morphINE sulfate **OR** morphINE sulfate        Fredy Méndez MD

## 2017-12-21 NOTE — PROGRESS NOTES
[facility]    Progress Note    Greta Orta Patient Status:  Inpatient    3/16/1955 MRN [id number]   Location [facility] 4W/SW/SE Attending Evelyn Gonzalez MD   Hosp Day # 1 PCP PHYSICIAN NONSTAFF     Assessment and Plan:     Improv The exam is limited because of the lack of IV and oral contrast. There is a left upper quadrant colocolic anastomosis, and a right upper quadrant ileocolic anastomosis from previous cancer resection surgery.  There is dilatation of proximal and mid small maty

## 2017-12-21 NOTE — CONSULTS
[facility]    PATIENT'S NAME: Maty Rodas   ATTENDING PHYSICIAN: Lizz Gallardo MD   CONSULTING PHYSICIAN: Jaquan Walton MD   PATIENT ACCOUNT#:   [de-identified]    LOCATION:  [address] [place] [address] #:   [id number]       DATE OF BIRTH:  [birth year] MEDICATIONS:  On admission, allopurinol, Norvasc, furosemide, hydrocodone, levothyroxine, lisinopril, loperamide, magnesium oxide, ondansetron, rosuvastatin. ALLERGIES:  Droperidol. SOCIAL HISTORY:  He is  and has 2 children.   He is a clin of his evaluation. PLAN:  I agree with IV fluid rehydration, nasogastric decompression, and Protonix therapy. We will continue close observation. He would be a poor operative candidate. No obvious biliary colic type symptomatology.   His elevated bloo

## 2017-12-21 NOTE — DISCHARGE SUMMARY
[facility]  Discharge Summary     German Bradley  : 3/16/1955    Status: Inpatient  Day #: 1    Attending: Eliot Putnam MD  PCP: PHYSICIAN NONSTAFF     Date of Admission: 2017  Date of Discharge: 2017     Hospital Discharge Esther nonfocal  Psychiatric:  Normal affect, calm and appropriate  Skin:  No rash, no lesion         Discharge Medications      CONTINUE taking these medications      Instructions Prescription details   allopurinol 300 MG Tabs  Commonly known as:  Jean Salvage Risk.    Risk of readmission: Stacy Desouza has Low Risk of readmission after discharge from the hospital.    I spent >30 minutes on this discharge, counseling patient, discussing with consultants and reconciling medications. All questions answered.

## 2018-02-07 ENCOUNTER — OFFICE VISIT (OUTPATIENT)
Dept: DERMATOLOGY CLINIC | Facility: CLINIC | Age: 63
End: 2018-02-07

## 2018-02-07 DIAGNOSIS — D23.5 BENIGN NEOPLASM OF SKIN OF TRUNK, EXCEPT SCROTUM: ICD-10-CM

## 2018-02-07 DIAGNOSIS — L82.1 SEBORRHEIC KERATOSES: ICD-10-CM

## 2018-02-07 DIAGNOSIS — D23.60 BENIGN NEOPLASM OF SKIN OF UPPER LIMB, INCLUDING SHOULDER, UNSPECIFIED LATERALITY: ICD-10-CM

## 2018-02-07 DIAGNOSIS — D23.4 BENIGN NEOPLASM OF SCALP AND SKIN OF NECK: ICD-10-CM

## 2018-02-07 DIAGNOSIS — D22.9 MULTIPLE NEVI: Primary | ICD-10-CM

## 2018-02-07 DIAGNOSIS — D23.30 BENIGN NEOPLASM OF SKIN OF FACE: ICD-10-CM

## 2018-02-07 PROCEDURE — 99212 OFFICE O/P EST SF 10 MIN: CPT | Performed by: DERMATOLOGY

## 2018-02-07 PROCEDURE — 99213 OFFICE O/P EST LOW 20 MIN: CPT | Performed by: DERMATOLOGY

## 2018-02-07 RX ORDER — TESTOSTERONE 50 MG/5G
GEL TRANSDERMAL
Refills: 5 | COMMUNITY
Start: 2018-01-12

## 2018-02-07 RX ORDER — LISINOPRIL 40 MG/1
40 TABLET ORAL
COMMUNITY
End: 2018-02-07

## 2018-02-07 RX ORDER — FLUTICASONE PROPIONATE 50 MCG
SPRAY, SUSPENSION (ML) NASAL
Status: ON HOLD | COMMUNITY
Start: 2017-12-13 | End: 2019-12-22

## 2018-02-07 RX ORDER — OCTISALATE, AVOBENZONE, HOMOSALATE, AND OCTOCRYLENE 29.4; 29.4; 49; 25.48 MG/ML; MG/ML; MG/ML; MG/ML
LOTION TOPICAL
Status: ON HOLD | COMMUNITY
End: 2019-12-22

## 2018-02-07 RX ORDER — LEVOTHYROXINE SODIUM 0.15 MG/1
TABLET ORAL
COMMUNITY
Start: 2018-02-05 | End: 2018-02-07

## 2018-02-07 RX ORDER — RIFAXIMIN 550 MG/1
TABLET ORAL
Status: ON HOLD | COMMUNITY
Start: 2017-12-28 | End: 2019-12-22

## 2018-02-07 RX ORDER — LEVOTHYROXINE SODIUM 175 UG/1
TABLET ORAL
COMMUNITY
End: 2018-02-07

## 2018-02-07 RX ORDER — LISINOPRIL 100 %
POWDER (GRAM) MISCELLANEOUS
COMMUNITY
End: 2018-02-07

## 2018-02-07 RX ORDER — AMLODIPINE BESYLATE 5 MG/1
TABLET ORAL
COMMUNITY
End: 2018-02-07

## 2018-02-07 RX ORDER — ALLOPURINOL 300 MG/1
TABLET ORAL
COMMUNITY
End: 2018-02-07

## 2018-02-07 RX ORDER — CHLORTHALIDONE 25 MG/1
12.5 TABLET ORAL DAILY
Status: ON HOLD | COMMUNITY
End: 2020-02-23

## 2018-02-18 NOTE — PROGRESS NOTES
Mark Rivas is a 58year old male. HPI:     CC:  Patient presents with:  Full Skin Exam: LOV 4/10/2017. Pt presenting for full  body skin exam. Pt denies personal hx of skin cancer. Lesion: Pt concened with lesions to back and buttocks.          Candis Aquino chlorthalidone 25 MG Oral Tab 12.5 mg. Disp:  Rfl:    Fluticasone Propionate 50 MCG/ACT Nasal Suspension  Disp:  Rfl:    TESTIM 50 MG/5GM (1%) Transdermal Gel  Disp:  Rfl: 5   XIFAXAN 550 MG Oral Tab  Disp:  Rfl:      Allergies:     Droperidol complaints. History, medications, allergies reviewed as noted. Physical Examination:     Well-developed well-nourished patient alert oriented in no acute distress.   Exam total-body performed, including scalp, head, neck, face,nails, hair, exte benign patterns of nevi on dermatoscopy     Wart vs gangloin (likely ganglion ) l 5th digit. obs stable. No other susupicious lesions on todays  exam.    Please refer to map for specific lesions. See additional diagnoses.   Pros cons of various thera

## 2018-07-23 ENCOUNTER — APPOINTMENT (OUTPATIENT)
Dept: PREADMISSION TESTING | Facility: HOSPITAL | Age: 63
End: 2018-07-23

## 2018-07-23 VITALS
RESPIRATION RATE: 20 BRPM | BODY MASS INDEX: 24.92 KG/M2 | SYSTOLIC BLOOD PRESSURE: 118 MMHG | HEIGHT: 71 IN | WEIGHT: 178 LBS | HEART RATE: 63 BPM | DIASTOLIC BLOOD PRESSURE: 79 MMHG | OXYGEN SATURATION: 98 % | TEMPERATURE: 97 F

## 2018-07-23 LAB
ANION GAP SERPL CALCULATED.3IONS-SCNC: 9.4 MMOL/L
BUN BLD-MCNC: 14 MG/DL (ref 8–23)
BUN/CREAT SERPL: 13.5 (ref 7–25)
CALCIUM SPEC-SCNC: 9.1 MG/DL (ref 8.6–10.5)
CHLORIDE SERPL-SCNC: 97 MMOL/L (ref 98–107)
CO2 SERPL-SCNC: 28.6 MMOL/L (ref 22–29)
CREAT BLD-MCNC: 1.04 MG/DL (ref 0.76–1.27)
DEPRECATED RDW RBC AUTO: 42.5 FL (ref 37–54)
ERYTHROCYTE [DISTWIDTH] IN BLOOD BY AUTOMATED COUNT: 12.2 % (ref 11.5–14.5)
GFR SERPL CREATININE-BSD FRML MDRD: 72 ML/MIN/1.73
GLUCOSE BLD-MCNC: 235 MG/DL (ref 65–99)
HCT VFR BLD AUTO: 45.3 % (ref 40.4–52.2)
HGB BLD-MCNC: 14.8 G/DL (ref 13.7–17.6)
MCH RBC QN AUTO: 31.4 PG (ref 27–32.7)
MCHC RBC AUTO-ENTMCNC: 32.7 G/DL (ref 32.6–36.4)
MCV RBC AUTO: 96 FL (ref 79.8–96.2)
PLATELET # BLD AUTO: 170 10*3/MM3 (ref 140–500)
PMV BLD AUTO: 10.6 FL (ref 6–12)
POTASSIUM BLD-SCNC: 4.2 MMOL/L (ref 3.5–5.2)
RBC # BLD AUTO: 4.72 10*6/MM3 (ref 4.6–6)
SODIUM BLD-SCNC: 135 MMOL/L (ref 136–145)
WBC NRBC COR # BLD: 5.74 10*3/MM3 (ref 4.5–10.7)

## 2018-07-23 PROCEDURE — 85027 COMPLETE CBC AUTOMATED: CPT | Performed by: OPHTHALMOLOGY

## 2018-07-23 PROCEDURE — 80048 BASIC METABOLIC PNL TOTAL CA: CPT | Performed by: OPHTHALMOLOGY

## 2018-07-23 PROCEDURE — 36415 COLL VENOUS BLD VENIPUNCTURE: CPT

## 2018-07-23 PROCEDURE — 93005 ELECTROCARDIOGRAM TRACING: CPT

## 2018-07-23 PROCEDURE — 93010 ELECTROCARDIOGRAM REPORT: CPT | Performed by: INTERNAL MEDICINE

## 2018-07-23 RX ORDER — DORZOLAMIDE HCL 20 MG/ML
1 SOLUTION/ DROPS OPHTHALMIC 2 TIMES DAILY
COMMUNITY

## 2018-07-23 NOTE — DISCHARGE INSTRUCTIONS
Take the following medications the morning of surgery with a small sip of water:  none        General Instructions:  • Do not eat solid food after midnight the night before surgery.  • You may drink clear liquids day of surgery but must stop at least one hour before your hospital arrival time.  • It is beneficial for you to have a clear drink that contains carbohydrates the day of surgery.  We suggest a 12 to 20 ounce bottle of Gatorade or Powerade for non-diabetic patients or a 12 to 20 ounce bottle of G2 or Powerade Zero for diabetic patients. (Pediatric patients, are not advised to drink a 12 to 20 ounce carbohydrate drink)    Clear liquids are liquids you can see through.  Nothing red in color.     Plain water                               Sports drinks  Sodas                                   Gelatin (Jell-O)  Fruit juices without pulp such as white grape juice and apple juice  Popsicles that contain no fruit or yogurt  Tea or coffee (no cream or milk added)  Gatorade / Powerade  G2 / Powerade Zero    • Infants may have breast milk up to four hours before surgery.  • Infants drinking formula may drink formula up to six hours before surgery.   • Patients who avoid smoking, chewing tobacco and alcohol for 4 weeks prior to surgery have a reduced risk of post-operative complications.  Quit smoking as many days before surgery as you can.  • Do not smoke, use chewing tobacco or drink alcohol the day of surgery.   • If applicable bring your C-PAP/ BI-PAP machine.  • Bring any papers given to you in the doctor’s office.  • Wear clean comfortable clothes and socks.  • Do not wear contact lenses or make-up.  Bring a case for your glasses.   • Bring crutches or walker if applicable.  • Remove all piercings.  Leave jewelry and any other valuables at home.  • Hair extensions with metal clips must be removed prior to surgery.  • The Pre-Admission Testing nurse will instruct you to bring medications if unable to obtain an  accurate list in Pre-Admission Testing.        If you were given a blood bank ID arm band remember to bring it with you the day of surgery.    Preventing a Surgical Site Infection:  • For 2 to 3 days before surgery, avoid shaving with a razor because the razor can irritate skin and make it easier to develop an infection.    • Any areas of open skin can increase the risk of a post-operative wound infection by allowing bacteria to enter and travel throughout the body.  Notify your surgeon if you have any skin wounds / rashes even if it is not near the expected surgical site.  The area will need assessed to determine if surgery should be delayed until it is healed.  • The night prior to surgery sleep in a clean bed with clean clothing.  Do not allow pets to sleep with you.  • Shower on the morning of surgery using a fresh bar of anti-bacterial soap (such as Dial) and clean washcloth.  Dry with a clean towel and dress in clean clothing.  • Ask your surgeon if you will be receiving antibiotics prior to surgery.  • Make sure you, your family, and all healthcare providers clean their hands with soap and water or an alcohol based hand  before caring for you or your wound.    Day of surgery:  Upon arrival, a Pre-op nurse and Anesthesiologist will review your health history, obtain vital signs, and answer questions you may have.  The only belongings needed at this time will be your home medications and if applicable your C-PAP/BI-PAP machine.  If you are staying overnight your family can leave the rest of your belongings in the car and bring them to your room later.  A Pre-op nurse will start an IV and you may receive medication in preparation for surgery, including something to help you relax.  Your family will be able to see you in the Pre-op area.  While you are in surgery your family should notify the waiting room  if they leave the waiting room area and provide a contact phone number.    Please be  aware that surgery does come with discomfort.  We want to make every effort to control your discomfort so please discuss any uncontrolled symptoms with your nurse.   Your doctor will most likely have prescribed pain medications.      If you are going home after surgery you will receive individualized written care instructions before being discharged.  A responsible adult must drive you to and from the hospital on the day of your surgery and stay with you for 24 hours.    If you are staying overnight following surgery, you will be transported to your hospital room following the recovery period.  [facility] has all private rooms.    You have received a list of surgical assistants for your reference.  If you have any questions please call Pre-Admission Testing at [phone].  Deductibles and co-payments are collected on the day of service. Please be prepared to pay the required co-pay, deductible or deposit on the day of service as defined by your plan.

## 2018-07-24 ENCOUNTER — ANESTHESIA EVENT (OUTPATIENT)
Dept: PERIOP | Facility: HOSPITAL | Age: 63
End: 2018-07-24

## 2018-07-24 ENCOUNTER — HOSPITAL ENCOUNTER (OUTPATIENT)
Facility: HOSPITAL | Age: 63
Setting detail: HOSPITAL OUTPATIENT SURGERY
Discharge: HOME | End: 2018-07-24
Attending: OPHTHALMOLOGY | Admitting: OPHTHALMOLOGY

## 2018-07-24 ENCOUNTER — ANESTHESIA (OUTPATIENT)
Dept: PERIOP | Facility: HOSPITAL | Age: 63
End: 2018-07-24

## 2018-07-24 VITALS
TEMPERATURE: 97.5 F | HEART RATE: 58 BPM | RESPIRATION RATE: 20 BRPM | WEIGHT: 178 LBS | SYSTOLIC BLOOD PRESSURE: 154 MMHG | DIASTOLIC BLOOD PRESSURE: 100 MMHG | OXYGEN SATURATION: 96 % | BODY MASS INDEX: 24.83 KG/M2

## 2018-07-24 LAB
GLUCOSE BLDC GLUCOMTR-MCNC: 146 MG/DL (ref 70–130)
GLUCOSE BLDC GLUCOMTR-MCNC: 175 MG/DL (ref 70–130)

## 2018-07-24 PROCEDURE — 25010000002 PROPOFOL 10 MG/ML EMULSION: Performed by: NURSE ANESTHETIST, CERTIFIED REGISTERED

## 2018-07-24 PROCEDURE — 25010000002 MIDAZOLAM PER 1 MG: Performed by: ANESTHESIOLOGY

## 2018-07-24 PROCEDURE — 82962 GLUCOSE BLOOD TEST: CPT

## 2018-07-24 PROCEDURE — 25010000002 FENTANYL CITRATE (PF) 100 MCG/2ML SOLUTION: Performed by: NURSE ANESTHETIST, CERTIFIED REGISTERED

## 2018-07-24 PROCEDURE — 25010000002 ONDANSETRON PER 1 MG: Performed by: NURSE ANESTHETIST, CERTIFIED REGISTERED

## 2018-07-24 PROCEDURE — 25010000002 PROMETHAZINE PER 50 MG: Performed by: ANESTHESIOLOGY

## 2018-07-24 DEVICE — IMPLANTABLE DEVICE: Type: IMPLANTABLE DEVICE | Site: CORNEA | Status: FUNCTIONAL

## 2018-07-24 RX ORDER — SODIUM CHLORIDE 0.9 % (FLUSH) 0.9 %
1-10 SYRINGE (ML) INJECTION AS NEEDED
Status: DISCONTINUED | OUTPATIENT
Start: 2018-07-24 | End: 2018-07-24 | Stop reason: HOSPADM

## 2018-07-24 RX ORDER — HYDROCODONE BITARTRATE AND ACETAMINOPHEN 5; 325 MG/1; MG/1
1 TABLET ORAL ONCE AS NEEDED
Status: DISCONTINUED | OUTPATIENT
Start: 2018-07-24 | End: 2018-07-24 | Stop reason: HOSPADM

## 2018-07-24 RX ORDER — MIDAZOLAM HYDROCHLORIDE 1 MG/ML
1 INJECTION INTRAMUSCULAR; INTRAVENOUS
Status: DISCONTINUED | OUTPATIENT
Start: 2018-07-24 | End: 2018-07-24 | Stop reason: HOSPADM

## 2018-07-24 RX ORDER — FENTANYL CITRATE 50 UG/ML
50 INJECTION, SOLUTION INTRAMUSCULAR; INTRAVENOUS
Status: DISCONTINUED | OUTPATIENT
Start: 2018-07-24 | End: 2018-07-24 | Stop reason: HOSPADM

## 2018-07-24 RX ORDER — HYDROCODONE BITARTRATE AND ACETAMINOPHEN 7.5; 325 MG/1; MG/1
1 TABLET ORAL ONCE AS NEEDED
Status: COMPLETED | OUTPATIENT
Start: 2018-07-24 | End: 2018-07-24

## 2018-07-24 RX ORDER — PROMETHAZINE HYDROCHLORIDE 25 MG/ML
6.25 INJECTION, SOLUTION INTRAMUSCULAR; INTRAVENOUS ONCE AS NEEDED
Status: DISCONTINUED | OUTPATIENT
Start: 2018-07-24 | End: 2018-07-24 | Stop reason: HOSPADM

## 2018-07-24 RX ORDER — FAMOTIDINE 10 MG/ML
20 INJECTION, SOLUTION INTRAVENOUS ONCE
Status: COMPLETED | OUTPATIENT
Start: 2018-07-24 | End: 2018-07-24

## 2018-07-24 RX ORDER — ONDANSETRON 2 MG/ML
INJECTION INTRAMUSCULAR; INTRAVENOUS AS NEEDED
Status: DISCONTINUED | OUTPATIENT
Start: 2018-07-24 | End: 2018-07-24 | Stop reason: SURG

## 2018-07-24 RX ORDER — FLUMAZENIL 0.1 MG/ML
0.2 INJECTION INTRAVENOUS AS NEEDED
Status: DISCONTINUED | OUTPATIENT
Start: 2018-07-24 | End: 2018-07-24 | Stop reason: HOSPADM

## 2018-07-24 RX ORDER — PROMETHAZINE HYDROCHLORIDE 25 MG/1
25 SUPPOSITORY RECTAL ONCE AS NEEDED
Status: DISCONTINUED | OUTPATIENT
Start: 2018-07-24 | End: 2018-07-24 | Stop reason: HOSPADM

## 2018-07-24 RX ORDER — LIDOCAINE HYDROCHLORIDE 20 MG/ML
INJECTION, SOLUTION INFILTRATION; PERINEURAL AS NEEDED
Status: DISCONTINUED | OUTPATIENT
Start: 2018-07-24 | End: 2018-07-24 | Stop reason: SURG

## 2018-07-24 RX ORDER — ONDANSETRON 4 MG/1
4 TABLET, FILM COATED ORAL DAILY PRN
Qty: 15 TABLET | Refills: 1 | Status: SHIPPED | OUTPATIENT
Start: 2018-07-24 | End: 2019-07-24

## 2018-07-24 RX ORDER — ONDANSETRON 2 MG/ML
4 INJECTION INTRAMUSCULAR; INTRAVENOUS ONCE AS NEEDED
Status: DISCONTINUED | OUTPATIENT
Start: 2018-07-24 | End: 2018-07-24 | Stop reason: HOSPADM

## 2018-07-24 RX ORDER — SCOLOPAMINE TRANSDERMAL SYSTEM 1 MG/1
1 PATCH, EXTENDED RELEASE TRANSDERMAL ONCE
Status: DISCONTINUED | OUTPATIENT
Start: 2018-07-24 | End: 2018-07-24 | Stop reason: HOSPADM

## 2018-07-24 RX ORDER — MIDAZOLAM HYDROCHLORIDE 1 MG/ML
2 INJECTION INTRAMUSCULAR; INTRAVENOUS
Status: DISCONTINUED | OUTPATIENT
Start: 2018-07-24 | End: 2018-07-24 | Stop reason: HOSPADM

## 2018-07-24 RX ORDER — FENTANYL CITRATE 50 UG/ML
100 INJECTION, SOLUTION INTRAMUSCULAR; INTRAVENOUS
Status: DISCONTINUED | OUTPATIENT
Start: 2018-07-24 | End: 2018-07-24 | Stop reason: HOSPADM

## 2018-07-24 RX ORDER — FENTANYL CITRATE 50 UG/ML
INJECTION, SOLUTION INTRAMUSCULAR; INTRAVENOUS AS NEEDED
Status: DISCONTINUED | OUTPATIENT
Start: 2018-07-24 | End: 2018-07-24 | Stop reason: SURG

## 2018-07-24 RX ORDER — HYDROCODONE BITARTRATE AND ACETAMINOPHEN 5; 325 MG/1; MG/1
1 TABLET ORAL EVERY 4 HOURS PRN
Qty: 15 TABLET | Refills: 0 | Status: SHIPPED | OUTPATIENT
Start: 2018-07-24 | End: 2019-10-14

## 2018-07-24 RX ORDER — PROMETHAZINE HYDROCHLORIDE 25 MG/ML
12.5 INJECTION, SOLUTION INTRAMUSCULAR; INTRAVENOUS ONCE
Status: COMPLETED | OUTPATIENT
Start: 2018-07-24 | End: 2018-07-24

## 2018-07-24 RX ORDER — LABETALOL HYDROCHLORIDE 5 MG/ML
5 INJECTION, SOLUTION INTRAVENOUS
Status: DISCONTINUED | OUTPATIENT
Start: 2018-07-24 | End: 2018-07-24 | Stop reason: HOSPADM

## 2018-07-24 RX ORDER — ERYTHROMYCIN 5 MG/G
OINTMENT OPHTHALMIC
Qty: 1 EACH | Refills: 1 | Status: SHIPPED | OUTPATIENT
Start: 2018-07-24 | End: 2019-10-14

## 2018-07-24 RX ORDER — SODIUM CHLORIDE, SODIUM LACTATE, POTASSIUM CHLORIDE, CALCIUM CHLORIDE 600; 310; 30; 20 MG/100ML; MG/100ML; MG/100ML; MG/100ML
9 INJECTION, SOLUTION INTRAVENOUS CONTINUOUS
Status: DISCONTINUED | OUTPATIENT
Start: 2018-07-24 | End: 2018-07-24 | Stop reason: HOSPADM

## 2018-07-24 RX ORDER — OXYCODONE HYDROCHLORIDE AND ACETAMINOPHEN 5; 325 MG/1; MG/1
2 TABLET ORAL ONCE AS NEEDED
Status: DISCONTINUED | OUTPATIENT
Start: 2018-07-24 | End: 2018-07-24 | Stop reason: HOSPADM

## 2018-07-24 RX ORDER — MAGNESIUM HYDROXIDE 1200 MG/15ML
LIQUID ORAL AS NEEDED
Status: DISCONTINUED | OUTPATIENT
Start: 2018-07-24 | End: 2018-07-24 | Stop reason: HOSPADM

## 2018-07-24 RX ORDER — EPHEDRINE SULFATE 50 MG/ML
5 INJECTION, SOLUTION INTRAVENOUS ONCE AS NEEDED
Status: DISCONTINUED | OUTPATIENT
Start: 2018-07-24 | End: 2018-07-24 | Stop reason: HOSPADM

## 2018-07-24 RX ORDER — PROMETHAZINE HYDROCHLORIDE 25 MG/1
25 TABLET ORAL ONCE AS NEEDED
Status: DISCONTINUED | OUTPATIENT
Start: 2018-07-24 | End: 2018-07-24 | Stop reason: HOSPADM

## 2018-07-24 RX ORDER — ERYTHROMYCIN 5 MG/G
OINTMENT OPHTHALMIC AS NEEDED
Status: DISCONTINUED | OUTPATIENT
Start: 2018-07-24 | End: 2018-07-24 | Stop reason: HOSPADM

## 2018-07-24 RX ORDER — LIDOCAINE HYDROCHLORIDE 10 MG/ML
0.5 INJECTION, SOLUTION EPIDURAL; INFILTRATION; INTRACAUDAL; PERINEURAL ONCE AS NEEDED
Status: DISCONTINUED | OUTPATIENT
Start: 2018-07-24 | End: 2018-07-24 | Stop reason: HOSPADM

## 2018-07-24 RX ORDER — HYDROMORPHONE HYDROCHLORIDE 1 MG/ML
0.5 INJECTION, SOLUTION INTRAMUSCULAR; INTRAVENOUS; SUBCUTANEOUS
Status: DISCONTINUED | OUTPATIENT
Start: 2018-07-24 | End: 2018-07-24 | Stop reason: HOSPADM

## 2018-07-24 RX ORDER — PROPOFOL 10 MG/ML
VIAL (ML) INTRAVENOUS AS NEEDED
Status: DISCONTINUED | OUTPATIENT
Start: 2018-07-24 | End: 2018-07-24 | Stop reason: SURG

## 2018-07-24 RX ORDER — DIPHENHYDRAMINE HYDROCHLORIDE 50 MG/ML
6.25 INJECTION INTRAMUSCULAR; INTRAVENOUS
Status: DISCONTINUED | OUTPATIENT
Start: 2018-07-24 | End: 2018-07-24 | Stop reason: HOSPADM

## 2018-07-24 RX ADMIN — SCOPOLAMINE 1 PATCH: 1 PATCH, EXTENDED RELEASE TRANSDERMAL at 07:21

## 2018-07-24 RX ADMIN — MIDAZOLAM 2 MG: 1 INJECTION INTRAMUSCULAR; INTRAVENOUS at 07:21

## 2018-07-24 RX ADMIN — HYDROCODONE BITARTRATE AND ACETAMINOPHEN 1 TABLET: 7.5; 325 TABLET ORAL at 09:58

## 2018-07-24 RX ADMIN — ONDANSETRON 4 MG: 2 INJECTION INTRAMUSCULAR; INTRAVENOUS at 08:45

## 2018-07-24 RX ADMIN — SODIUM CHLORIDE, POTASSIUM CHLORIDE, SODIUM LACTATE AND CALCIUM CHLORIDE 9 ML/HR: 600; 310; 30; 20 INJECTION, SOLUTION INTRAVENOUS at 07:14

## 2018-07-24 RX ADMIN — FAMOTIDINE 20 MG: 10 INJECTION, SOLUTION INTRAVENOUS at 07:21

## 2018-07-24 RX ADMIN — LIDOCAINE HYDROCHLORIDE 60 MG: 20 INJECTION, SOLUTION INFILTRATION; PERINEURAL at 07:54

## 2018-07-24 RX ADMIN — FENTANYL CITRATE 50 MCG: 50 INJECTION INTRAMUSCULAR; INTRAVENOUS at 07:54

## 2018-07-24 RX ADMIN — PROPOFOL 200 MG: 10 INJECTION, EMULSION INTRAVENOUS at 07:54

## 2018-07-24 RX ADMIN — PROMETHAZINE HYDROCHLORIDE 12.5 MG: 25 INJECTION INTRAMUSCULAR; INTRAVENOUS at 07:21

## 2018-07-24 NOTE — ANESTHESIA PROCEDURE NOTES
Airway  Urgency: elective      General Information and Staff    Patient location during procedure: OR  Anesthesiologist: REYNA BRAR  CRNA: CATALINA MALDONADO    Indications and Patient Condition    Preoxygenated: yes  Mask difficulty assessment: 0 - not attempted    Final Airway Details  Final airway type: supraglottic airway      Successful airway: unique  Size 5    Number of attempts at approach: 1    Additional Comments  Atraumatic, adeq seal, secured, MV/AV until SV

## 2018-07-24 NOTE — OP NOTE
OPERATIVE NOTE    Patient Identification:  Name: Nash Cannon  Age: 63 y.o.  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  Right lower eyelid symblepharon lysis 2.  Lower eyelid fornix reconstruction with mucous membrane graft 3.  Placement of amniotic membrane tissue to right eye with placement of Prokera ring 4.  Right temporary tarsorrhaphy  Surgeon: Jose Daniel Almanzar M.D. who was present and scrubbed throughout all critical portions of the operation  Assistants: Alessandro Pineda M.D. and Ed Bee M.D.  Anesthesia: General  EBL: less than 50cc  Specimens:  None    Description of the procedure: The patient was taken to the operating room and placed on the table in the supine position, where anesthesia was induced. 2% lidocaine with epinephrine and 0.5% marcaine in a 1:1 fashion was injected over the surgical site, and the patient was prepped and draped in the usual manner for orbitofacial surgery.     Corneal protectors were placed in both eyes.     The right lower eyelid was examined and noted to have fused to the corneal limbus nasally.  The symblepharon had formed from the lid margin near the punctum.  The lid eyelid was everted away from the globe and Homero scissor was used to sharply dissect the symblepharon from the corneal limbus.  The [employer] blade was then used to remove the remaining scar tissue from the corneal surface.  The Homero's were then used to sharply dissect both the conjunctiva on the palpebral conjunctiva going into the fornix, to prepare the bed for the mucous membrane graft.    The lower lip was then placed onto towel clips and a marking pen was used to fela the area for buccal graft harvest.  A 1 cm x 5 mm buccal graft was taken from inside the  lower lip.  Gelfoam was packed in the mouth to achieve hemostasis.  Dismembering graft was then thinned to the desired thickness and placed in the right lower eyelid medial fornix.  The anterior edge was fixated with 8-0 Vicryl sutures to the lid margin where the symblepharon had formed remainder of the graft was placed in the fornix and the posterior edge was sewn to the bulbar conjunctiva.    Finally the amniotic membrane tissue grafts were placed, first a sheath of amniotic membrane was laid on the corneal surface taking care to cover the prior symblepharon adhesions site.  A ProCare ring with another sheet of amniotic membrane was then placed in the right eye with the ring in good position in the fornix.      The corneal protectors were removed and antibiotic ophthalmic ointment was placed over the surgical site.  A 4-0 double-arm silk suture was then used to place a right temporary tarsorrhaphy.    The patient was then awakened and taken from the operating room in good condition, having tolerated the procedure well. There were no complications, and the estimated blood loss was less than 50 cc.

## 2018-07-24 NOTE — ANESTHESIA PREPROCEDURE EVALUATION
Anesthesia Evaluation     Patient summary reviewed and Nursing notes reviewed   history of anesthetic complications: PONV  NPO Solid Status: > 8 hours             Airway   Mallampati: II  TM distance: >3 FB  Neck ROM: full  no difficulty expected  Dental - normal exam     Pulmonary - negative pulmonary ROS and normal exam   Cardiovascular - negative cardio ROS and normal exam        Neuro/Psych- negative ROS  GI/Hepatic/Renal/Endo    (+)   diabetes mellitus using insulin,     Musculoskeletal (-) negative ROS    Abdominal  - normal exam   Substance History - negative use     OB/GYN negative ob/gyn ROS         Other                        Anesthesia Plan    ASA 2     general   (Severe ponv   Decadron makes his blood sugar go way up)  intravenous induction   Anesthetic plan and risks discussed with patient.    Plan discussed with CRNA.

## 2018-07-24 NOTE — ANESTHESIA POSTPROCEDURE EVALUATION
Patient: Nash Cannon    Procedure Summary     Date:  07/24/18 Room / Location:   [facility]    Anesthesia Start:  0748 Anesthesia Stop:  0859    Procedure:  RT LOWER LID SYMBLEPHOROLYSIS  FORNIX RECONMSTRUCTION  INSERTION OF PROKERA AMNIOTIC MEMBRANE AMNIOTIC MEMBRANE GRAFT FOR FORNIX  MUCUS MEMBRANE GRAFT (Right Eye) Diagnosis:      Surgeon:  Jose Daniel Almanzar MD Provider:  Gavino Bautista MD    Anesthesia Type:  general ASA Status:  2          Anesthesia Type: general  Last vitals  BP   142/93 (07/24/18 0945)   Temp   36.4 °C (97.5 °F) (07/24/18 0930)   Pulse   58 (07/24/18 0945)   Resp   16 (07/24/18 0945)     SpO2   99 % (07/24/18 0945)     Post Anesthesia Care and Evaluation    Patient location during evaluation: bedside  Patient participation: complete - patient participated  Level of consciousness: awake and alert  Pain management: adequate  Airway patency: patent  Anesthetic complications: No anesthetic complications    Cardiovascular status: acceptable  Respiratory status: acceptable  Hydration status: acceptable    Comments: /93   Pulse 58   Temp 36.4 °C (97.5 °F) (Temporal Artery )   Resp 16   Wt 80.7 kg (178 lb)   SpO2 99%   BMI 24.83 kg/m²

## 2018-07-24 NOTE — H&P
History & Physical       Patient: Nash Cannon    Date of Admission: 7/24/2018  5:56 AM    YOB: 1955    Medical Record Number: [id number]      Chief Complaints: symblepharon right lower eyelid      History of Present Illness: 63 y.o. male presents with symblepharon right lower eyelid. No new meds/health problems since office visit      Allergies: No Known Allergies    10 point review of systems negative, except pertaining to the HPI    Medications:   Home Medications:  No current facility-administered medications on file prior to encounter.      Current Outpatient Prescriptions on File Prior to Encounter   Medication Sig   • insulin glargine (LANTUS) 100 UNIT/ML injection Inject 12 Units under the skin Every Night.   • insulin glulisine (APIDRA) 100 UNIT/ML injection Inject  under the skin 3 (Three) Times a Day Before Meals. 1 UNIT FOR EVERY CARB FOR EVERY TIME I EAT   • levothyroxine (SYNTHROID, LEVOTHROID) 137 MCG tablet Take 137 mcg by mouth Every Morning.     Current Medications:  Scheduled Meds:  Continuous Infusions:  No current facility-administered medications for this encounter.   PRN Meds:.    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  RT EYE MUSCLE RESTRICTED DUE TO SCARR TISSUE FROM RADIATION TX FOR SQUAMOUS CELL   • Eye disorder     RIGHT EYE  SCAR TISSUE DUE TO RADIATION TX'S   • Neck pain on right side    • PONV (postoperative nausea and vomiting)    • Sciatica of right side    • Seasonal allergies         Past Surgical History:   Procedure Laterality Date   • DACRYOCYSTORHINOSTOMY W/ CLARK TUBE Right     3 CLARK TUBE SURGERIES    DUE TO RADIATION TX'S   • EYE PTOSIS REPAIR Bilateral 5/18/2017    Procedure: EUGENE UPPER LID EYE PTOSIS REPAIR RT LOWER LID LYSIS SYMBLEPHARON.   EXCISION OF CONJUNCTIVOPLASTY LESION FOR BIOPSY FORNIX RECONSTRUCTION WITH AMNIOTIC MEMBRANE GRAFT RIGHT EYE;  Surgeon: Jose Daniel Almanzar MD;  Location: [facility];  Service:    • HERNIA REPAIR     • KNEE ARTHROSCOPY Right    • MOHS SURGERY Right     RIGHT EYE AREA  AFTER SQUAMOUS CELL SURGERY  5 YRS AGO   • TONSILLECTOMY          Social History     Occupational History   • Not on file.     Social History Main Topics   • Smoking status: Never Smoker   • Smokeless tobacco: Never Used   • Alcohol use No      Comment: OCC   • Drug use: No   • Sexual activity: Not on file    Social History     Social History Narrative   • No narrative on file        Family History   Problem Relation Age of Onset   • Malig Hyperthermia Neg Hx            Physical Exam   Constitutional: Alert, cooperative, in no acute distress    Head: Normocephalic.   Eyes:   Symblepharon right lower eyelid  Neck: Normal range of motion.   Cardiovascular: Normal rate.    Pulmonary/Chest: Effort normal.   Neurological: Alert.   Skin: Skin is warm.   Psychiatric: Normal mood and affect.       Assessment/Plan:  The patient voiced understanding of the risks, benefits, and alternative forms of treatment that were discussed and the patient consents to proceed with RT LOWER LID SYMBLEPHOROLYSIS FORNIX RECONSTRUCTION  INSERTION OF PROKERA AMNIOTIC MEMBRANE AMNIOTIC MEMBRANE GRAFT FOR FORNIX.       Alessandro Pineda Jr, MD

## 2018-07-24 NOTE — OP NOTE
Preoperative diagnosis: Right symblepharon with cicatrized infeiror fornix  Postoperative diagnosis: same  Procedure: right fornix reconstruction with mucous membrane graft and lysis of symblepharon, placement of amniotic membrane graft  Surgeon: Dr. Jose Daniel Almanzar who was present and scrubbed throughout all critical portions of the operation  Assistants: Alessandro Pineda Jr, MD  Anesthesia: General  EBL: less than 50cc  Specimens: * No orders in the log *    Description of the procedure: The patient was taken to the operating room and placed on the table in the supine position, where anesthesia was induced. 2% lidocaine with epinephrine and 0.5% marcaine in a 1:1 fashion was injected over the surgical site, and the patient was prepped and draped in the usual manner for orbitofacial surgery.     Corneal protectors were placed in both eyes. Afrin soaked cottonoids were placed in the ipsilateral nasal vault.    The symblepharon under the right lower eyelid medially was incised using the Cheko scissors. A  #69 [place] blade was used to remove the remaining adhering tissue from the limbus.    Next portion of the procedure was the harvest of the mucous membrane graft. The lower lip was everted. Methylene blue was used to fela the area for the harvest of the graft. A #15 Bard-Aaron blade was used to make the incision in the oral mucosa and the Cheko scissors were used to excise a very thin graft. The graft was then placed onto the right medial and attached to the medial aspect of the palpebral conjunctiva in the focal a#a where the cicatrix has occurred. The graft was sewn into position using interrupted 8-0 vicryl suture.    An amniotic membrane graft was placed over the medial limbus. Then a Prokera ring was then placed onto the corneal surface. A 5-0 silk suture tarsorrhaphy was placed at the end of the case. Ointment was placed onto the surgical site.     The patient was awakened and taken to the PACU  in stable condition.

## 2019-02-07 ENCOUNTER — HOSPITAL ENCOUNTER (OUTPATIENT)
Dept: ORTHOPEDIC SURGERY | Facility: CLINIC | Age: 64
Discharge: HOME | End: 2019-02-07
Attending: ORTHOPAEDIC SURGERY | Admitting: ORTHOPAEDIC SURGERY

## 2019-03-08 ENCOUNTER — HOSPITAL ENCOUNTER (OUTPATIENT)
Dept: PREADMISSION TESTING | Facility: HOSPITAL | Age: 64
Discharge: HOME | End: 2019-03-08
Attending: ORTHOPAEDIC SURGERY | Admitting: ORTHOPAEDIC SURGERY

## 2019-03-08 LAB
ANION GAP SERPL CALC-SCNC: 10 MMOL/L (ref 10–20)
APTT BLD: 25 SEC (ref 24–31)
BACTERIA SPEC AEROBE CULT: NORMAL
BASOPHILS # BLD AUTO: 0.1 10*3/UL (ref 0–0.2)
BASOPHILS NFR BLD AUTO: 1 % (ref 0–2)
BUN SERPL-MCNC: 13 MG/DL (ref 8–20)
BUN/CREAT SERPL: 13 (ref 6.2–20.3)
CALCIUM SERPL-MCNC: 9 MG/DL (ref 8.9–10.3)
CHLORIDE SERPL-SCNC: 101 MMOL/L (ref 101–111)
CONV CO2: 28 MMOL/L (ref 22–32)
CREAT UR-MCNC: 1 MG/DL (ref 0.7–1.2)
DIFFERENTIAL METHOD BLD: (no result)
EOSINOPHIL # BLD AUTO: 0.1 10*3/UL (ref 0–0.3)
EOSINOPHIL # BLD AUTO: 2 % (ref 0–3)
ERYTHROCYTE [DISTWIDTH] IN BLOOD BY AUTOMATED COUNT: 12.9 % (ref 11.5–14.5)
GLUCOSE SERPL-MCNC: 175 MG/DL (ref 65–99)
HCT VFR BLD AUTO: 45.7 % (ref 40–54)
HGB BLD-MCNC: 15 G/DL (ref 14–18)
INR PPP: 1
LYMPHOCYTES # BLD AUTO: 1.6 10*3/UL (ref 0.8–4.8)
LYMPHOCYTES NFR BLD AUTO: 31 % (ref 18–42)
Lab: NORMAL
MCH RBC QN AUTO: 31.3 PG (ref 26–32)
MCHC RBC AUTO-ENTMCNC: 32.9 G/DL (ref 32–36)
MCV RBC AUTO: 95 FL (ref 80–94)
MICRO REPORT STATUS: NORMAL
MONOCYTES # BLD AUTO: 0.5 10*3/UL (ref 0.1–1.3)
MONOCYTES NFR BLD AUTO: 9 % (ref 2–11)
NEUTROPHILS # BLD AUTO: 2.9 10*3/UL (ref 2.3–8.6)
NEUTROPHILS NFR BLD AUTO: 57 % (ref 50–75)
NRBC BLD AUTO-RTO: 0 /100{WBCS}
NRBC/RBC NFR BLD MANUAL: 0 10*3/UL
PLATELET # BLD AUTO: 193 10*3/UL (ref 150–450)
PMV BLD AUTO: 7.7 FL (ref 7.4–10.4)
POTASSIUM SERPL-SCNC: 4 MMOL/L (ref 3.6–5.1)
PROTHROMBIN TIME: 10.4 SEC (ref 9.6–11.7)
RBC # BLD AUTO: 4.81 10*6/UL (ref 4.6–6)
SODIUM SERPL-SCNC: 135 MMOL/L (ref 136–144)
SPECIMEN SOURCE: NORMAL
WBC # BLD AUTO: 5.1 10*3/UL (ref 4.5–11.5)

## 2019-03-15 ENCOUNTER — HOSPITAL ENCOUNTER (OUTPATIENT)
Dept: PREOP | Facility: HOSPITAL | Age: 64
Setting detail: HOSPITAL OUTPATIENT SURGERY
Discharge: HOME | End: 2019-03-15
Attending: ORTHOPAEDIC SURGERY | Admitting: ORTHOPAEDIC SURGERY

## 2019-03-15 LAB
GLUCOSE BLD-MCNC: 156 MG/DL (ref 70–105)
GLUCOSE BLD-MCNC: 190 MG/DL (ref 70–105)
GLUCOSE BLD-MCNC: 200 MG/DL (ref 70–105)
GLUCOSE BLD-MCNC: ABNORMAL MG/DL (ref 70–105)

## 2019-06-17 ENCOUNTER — OFFICE VISIT (OUTPATIENT)
Dept: ORTHOPEDIC SURGERY | Facility: CLINIC | Age: 64
End: 2019-06-17

## 2019-06-17 VITALS
SYSTOLIC BLOOD PRESSURE: 128 MMHG | WEIGHT: 181 LBS | BODY MASS INDEX: 25.34 KG/M2 | HEART RATE: 67 BPM | HEIGHT: 71 IN | DIASTOLIC BLOOD PRESSURE: 78 MMHG

## 2019-06-17 DIAGNOSIS — R20.0 NUMBNESS AND TINGLING IN RIGHT HAND: ICD-10-CM

## 2019-06-17 DIAGNOSIS — M75.121 COMPLETE TEAR OF RIGHT ROTATOR CUFF: Primary | ICD-10-CM

## 2019-06-17 DIAGNOSIS — R20.2 NUMBNESS AND TINGLING IN RIGHT HAND: ICD-10-CM

## 2019-06-17 PROCEDURE — 99213 OFFICE O/P EST LOW 20 MIN: CPT | Performed by: ORTHOPAEDIC SURGERY

## 2019-06-17 RX ORDER — INSULIN ASPART 100 [IU]/ML
INJECTION, SOLUTION INTRAVENOUS; SUBCUTANEOUS
Refills: 0 | COMMUNITY
Start: 2019-05-09 | End: 2019-10-14

## 2019-06-17 RX ORDER — NAPROXEN 500 MG/1
500 TABLET ORAL 2 TIMES DAILY
Refills: 0 | COMMUNITY
Start: 2019-03-15 | End: 2019-10-14

## 2019-06-17 RX ORDER — INSULIN GLARGINE 100 [IU]/ML
INJECTION, SOLUTION SUBCUTANEOUS
Refills: 1 | COMMUNITY
Start: 2019-04-04 | End: 2019-10-14

## 2019-06-17 RX ORDER — CEPHALEXIN 500 MG/1
500 CAPSULE ORAL 4 TIMES DAILY
Refills: 0 | COMMUNITY
Start: 2019-03-15 | End: 2019-10-14

## 2019-06-17 NOTE — PROGRESS NOTES
"3/15/19 Right shoulder arthroscopy with distal clavicle excision, supraspinatus repair biceps tenodesis  Shoulder pain is improving.  Having some tingling to his shoulder blade and hand.  Has a history of cervical fusion in November     Patient ID: Nash Cannon is a 64 y.o. male.              Right shoulder pain  Right hand pain    Objective:    /78 (BP Location: Left arm)   Pulse 67   Ht 180.3 cm (71\")   Wt 82.1 kg (181 lb)   BMI 25.24 kg/m²     Physical Examination:  He is a pleasant male in no distress. He is alert and oriented x3 and appears his stated age.  Incisions to the shoulder are healed.  Passive elevation is 170 degrees, abduction 130 degrees external rotation 20 degrees with intact repair.  He has some mild tingling to the thumb, index, and long finger.  Carpal tunnel compression and Tinel's are mildly positive with full range of motion of the digits. Sensory and motor exam are intact all distributions. Radial pulse is palpable and capillary refill is less than two seconds to all digits          Imaging:          Assessment:  Doing well after cuff repair  Right hand tingling      Plan:  Continue strengthening program.  Return to work with restrictions.  Night splint for his wrist and see me in a month    "

## 2019-06-19 ENCOUNTER — DOCUMENTATION (OUTPATIENT)
Dept: ORTHOPEDIC SURGERY | Facility: CLINIC | Age: 64
End: 2019-06-19

## 2019-07-18 ENCOUNTER — OFFICE VISIT (OUTPATIENT)
Dept: ORTHOPEDIC SURGERY | Facility: CLINIC | Age: 64
End: 2019-07-18

## 2019-07-18 VITALS
SYSTOLIC BLOOD PRESSURE: 117 MMHG | HEIGHT: 71 IN | DIASTOLIC BLOOD PRESSURE: 72 MMHG | BODY MASS INDEX: 25.34 KG/M2 | WEIGHT: 181 LBS | HEART RATE: 64 BPM

## 2019-07-18 DIAGNOSIS — M75.121 COMPLETE TEAR OF RIGHT ROTATOR CUFF: ICD-10-CM

## 2019-07-18 DIAGNOSIS — R20.0 NUMBNESS AND TINGLING OF RIGHT ARM: Primary | ICD-10-CM

## 2019-07-18 DIAGNOSIS — R20.2 NUMBNESS AND TINGLING OF RIGHT ARM: Primary | ICD-10-CM

## 2019-07-18 PROCEDURE — 99213 OFFICE O/P EST LOW 20 MIN: CPT | Performed by: ORTHOPAEDIC SURGERY

## 2019-07-18 NOTE — PROGRESS NOTES
"     Patient ID: Nash Cannon is a 64 y.o. male.  Shoulder pain right side  3/15/19 Right shoulder arthroscopy with distal clavicle excision, supraspinatus repair biceps tenodesis  Has been using night splint for possible carpal tunnel syndrome but symptoms are not much improved    Review of Systems:  Shoulder pain improving  Right hand tingling      Objective:    /72   Pulse 64   Ht 180.3 cm (71\")   Wt 82.1 kg (181 lb)   BMI 25.24 kg/m²     Physical Examination:   He is a pleasant male in no distress. He is alert and oriented x3 and appears his stated age.  Right shoulder demonstrates healed incisions without tenderness.  Passive elevation 160 degrees abduction 120 degrees external rotation 30 degrees with intact repair and tenodesis.  He states there is continued mild tingling to the index and thumb finger but intact capillary refill      Imaging:       Assessment:    Well after cuff repair  Right hand tingling    Plan:  Progressive strengthening and stretching as tolerated for the shoulder.  I recommend EMG for the right hand  "

## 2019-07-26 ENCOUNTER — PROCEDURE VISIT (OUTPATIENT)
Dept: NEUROLOGY | Facility: CLINIC | Age: 64
End: 2019-07-26

## 2019-07-26 DIAGNOSIS — E10.42 DIABETIC POLYNEUROPATHY ASSOCIATED WITH TYPE 1 DIABETES MELLITUS (HCC): ICD-10-CM

## 2019-07-26 DIAGNOSIS — G56.01 CARPAL TUNNEL SYNDROME OF RIGHT WRIST: Primary | ICD-10-CM

## 2019-07-26 PROCEDURE — 95908 NRV CNDJ TST 3-4 STUDIES: CPT | Performed by: PSYCHIATRY & NEUROLOGY

## 2019-07-26 PROCEDURE — 95886 MUSC TEST DONE W/N TEST COMP: CPT | Performed by: PSYCHIATRY & NEUROLOGY

## 2019-07-26 NOTE — PROGRESS NOTES
EMG and Nerve Conduction Studies    The complete report includes the data sheets.     Referring Doctor:Dr. Vitale     Indication:RUE numbness and tingling in the hand.    History This patient has had recent spinal surgery and shoulder surgery.  He now notes numbness in the hand.    Are you a diabetic? yesHepatitis?noHIV?noPacemaker?noDefibrillator?noBlood thinner?noAre you allergic to latex or tape?no    Results:    1.  There is evidence of moderate right median neuropathy at the wrist.  The Median motor and sensory distal latencies are prolonged.  There is a marked delay of the median palmar latency compared to the ulnar palmar latency.      2. There is evidence of mild generalized neuropathy as the ulnar sensory distal latency is prolonged.    3.  There are chronic neurogenic changes in the C5-6 myotomes, but no acute changes.      Impression:    This is an abnormal study.  There is evidence of moderate median neuropathy at the wrist superimposed on a mild generalized neuropathy.   There is evidence of chronic C5-6 radiculopathy.        Joseph Seipel, M.D.

## 2019-08-19 ENCOUNTER — OFFICE VISIT (OUTPATIENT)
Dept: ORTHOPEDIC SURGERY | Facility: CLINIC | Age: 64
End: 2019-08-19

## 2019-08-19 VITALS
SYSTOLIC BLOOD PRESSURE: 119 MMHG | WEIGHT: 181 LBS | HEIGHT: 71 IN | HEART RATE: 76 BPM | BODY MASS INDEX: 25.34 KG/M2 | DIASTOLIC BLOOD PRESSURE: 73 MMHG

## 2019-08-19 DIAGNOSIS — R20.2 NUMBNESS AND TINGLING OF RIGHT ARM: Primary | ICD-10-CM

## 2019-08-19 DIAGNOSIS — R20.0 NUMBNESS AND TINGLING OF RIGHT ARM: Primary | ICD-10-CM

## 2019-08-19 PROCEDURE — 99213 OFFICE O/P EST LOW 20 MIN: CPT | Performed by: ORTHOPAEDIC SURGERY

## 2019-08-19 NOTE — PROGRESS NOTES
"     Patient ID: Nash Cannon is a 64 y.o. male.  Right arm pain with hand tingling    Review of Systems:  Right arm pain  Denies chest pain      Objective:    /73   Pulse 76   Ht 180.3 cm (71\")   Wt 82.1 kg (181 lb)   BMI 25.24 kg/m²     Physical Examination:   He is a pleasant male in no distress. He is alert and oriented x3 and appears his stated age.  Right hand demonstrates no scars and no atrophy.  He has mild tingling to the tip of the index and long finger.  Motor function and capillary refill are intact with full range of motion of the digits      Imaging:   EMG demonstrates mild to moderate median nerve dysfunction of the wrist    Assessment:    Carpal tunnel syndrome right side with possible cervical issues     Plan:  he is going to see his neck doctor next week, contact me if he would like to proceed with surgery.  "

## 2019-10-14 ENCOUNTER — APPOINTMENT (OUTPATIENT)
Dept: PREADMISSION TESTING | Facility: HOSPITAL | Age: 64
End: 2019-10-14

## 2019-10-14 VITALS
WEIGHT: 173.1 LBS | OXYGEN SATURATION: 100 % | HEART RATE: 62 BPM | HEIGHT: 71 IN | SYSTOLIC BLOOD PRESSURE: 133 MMHG | DIASTOLIC BLOOD PRESSURE: 84 MMHG | TEMPERATURE: 97.2 F | BODY MASS INDEX: 24.23 KG/M2 | RESPIRATION RATE: 16 BRPM

## 2019-10-14 LAB
ANION GAP SERPL CALCULATED.3IONS-SCNC: 11 MMOL/L (ref 5–15)
BUN BLD-MCNC: 16 MG/DL (ref 8–23)
BUN/CREAT SERPL: 16.2 (ref 7–25)
CALCIUM SPEC-SCNC: 8.9 MG/DL (ref 8.6–10.5)
CHLORIDE SERPL-SCNC: 97 MMOL/L (ref 98–107)
CO2 SERPL-SCNC: 27 MMOL/L (ref 22–29)
CREAT BLD-MCNC: 0.99 MG/DL (ref 0.76–1.27)
DEPRECATED RDW RBC AUTO: 43.7 FL (ref 37–54)
ERYTHROCYTE [DISTWIDTH] IN BLOOD BY AUTOMATED COUNT: 12.2 % (ref 12.3–15.4)
GFR SERPL CREATININE-BSD FRML MDRD: 76 ML/MIN/1.73
GLUCOSE BLD-MCNC: 208 MG/DL (ref 65–99)
HCT VFR BLD AUTO: 43.6 % (ref 37.5–51)
HGB BLD-MCNC: 14.5 G/DL (ref 13–17.7)
MCH RBC QN AUTO: 31.9 PG (ref 26.6–33)
MCHC RBC AUTO-ENTMCNC: 33.3 G/DL (ref 31.5–35.7)
MCV RBC AUTO: 96 FL (ref 79–97)
PLATELET # BLD AUTO: 178 10*3/MM3 (ref 140–450)
PMV BLD AUTO: 10.3 FL (ref 6–12)
POTASSIUM BLD-SCNC: 4.1 MMOL/L (ref 3.5–5.2)
RBC # BLD AUTO: 4.54 10*6/MM3 (ref 4.14–5.8)
SODIUM BLD-SCNC: 135 MMOL/L (ref 136–145)
WBC NRBC COR # BLD: 5.97 10*3/MM3 (ref 3.4–10.8)

## 2019-10-14 PROCEDURE — 85027 COMPLETE CBC AUTOMATED: CPT | Performed by: OPHTHALMOLOGY

## 2019-10-14 PROCEDURE — 80048 BASIC METABOLIC PNL TOTAL CA: CPT | Performed by: OPHTHALMOLOGY

## 2019-10-14 PROCEDURE — 93010 ELECTROCARDIOGRAM REPORT: CPT | Performed by: INTERNAL MEDICINE

## 2019-10-14 PROCEDURE — 93005 ELECTROCARDIOGRAM TRACING: CPT

## 2019-10-14 PROCEDURE — 36415 COLL VENOUS BLD VENIPUNCTURE: CPT

## 2019-10-14 RX ORDER — INSULIN GLARGINE 100 [IU]/ML
18 INJECTION, SOLUTION SUBCUTANEOUS EVERY MORNING
COMMUNITY

## 2019-10-14 NOTE — DISCHARGE INSTRUCTIONS
PLEASE ARRIVE AT 8:30AM ON 10/15/2019      Take the following medications the morning of surgery with a small sip of water:  NO MEDS      General Instructions:  • Do not eat solid food after midnight the night before surgery.  • You may drink clear liquids day of surgery but must stop at least one hour before your hospital arrival time. **STOP FLUIDS AT 7:30AM DAY OF SURGERY**  • It is beneficial for you to have a clear drink that contains carbohydrates the day of surgery.  We suggest a 12 to 20 ounce bottle of Gatorade or Powerade for non-diabetic patients or a 12 to 20 ounce bottle of G2 or Powerade Zero for diabetic patients. (Pediatric patients, are not advised to drink a 12 to 20 ounce carbohydrate drink)    Clear liquids are liquids you can see through.  Nothing red in color.     Plain water                               Sports drinks  Sodas                                   Gelatin (Jell-O)  Fruit juices without pulp such as white grape juice and apple juice  Popsicles that contain no fruit or yogurt  Tea or coffee (no cream or milk added)  Gatorade / Powerade  G2 / Powerade Zero    • Infants may have breast milk up to four hours before surgery.  • Infants drinking formula may drink formula up to six hours before surgery.   • Patients who avoid smoking, chewing tobacco and alcohol for 4 weeks prior to surgery have a reduced risk of post-operative complications.  Quit smoking as many days before surgery as you can.  • Do not smoke, use chewing tobacco or drink alcohol the day of surgery.   • If applicable bring your C-PAP/ BI-PAP machine.  • Bring any papers given to you in the doctor’s office.  • Wear clean comfortable clothes.  • Do not wear contact lenses, false eyelashes or make-up.  Bring a case for your glasses.   • Bring crutches or walker if applicable.  • Remove all piercings.  Leave jewelry and any other valuables at home.  • Hair extensions with metal clips must be removed prior to surgery.  • The  Pre-Admission Testing nurse will instruct you to bring medications if unable to obtain an accurate list in Pre-Admission Testing.          Preventing a Surgical Site Infection:  • For 2 to 3 days before surgery, avoid shaving with a razor because the razor can irritate skin and make it easier to develop an infection.    • Any areas of open skin can increase the risk of a post-operative wound infection by allowing bacteria to enter and travel throughout the body.  Notify your surgeon if you have any skin wounds / rashes even if it is not near the expected surgical site.  The area will need assessed to determine if surgery should be delayed until it is healed.  • The night prior to surgery sleep in a clean bed with clean clothing.  Do not allow pets to sleep with you.  • Shower on the morning of surgery using a fresh bar of anti-bacterial soap (such as Dial) and clean washcloth.  Dry with a clean towel and dress in clean clothing.  • Ask your surgeon if you will be receiving antibiotics prior to surgery.  • Make sure you, your family, and all healthcare providers clean their hands with soap and water or an alcohol based hand  before caring for you or your wound.    Day of surgery:  Your arrival time is approximately two hours before your scheduled surgery time.  Upon arrival, a Pre-op nurse and Anesthesiologist will review your health history, obtain vital signs, and answer questions you may have.  The only belongings needed at this time will be a list of your home medications and if applicable your C-PAP/BI-PAP machine.  If you are staying overnight your family can leave the rest of your belongings in the car and bring them to your room later.  A Pre-op nurse will start an IV and you may receive medication in preparation for surgery, including something to help you relax.  Your family will be able to see you in the Pre-op area.  Two visitors at a time will be allowed in the Pre-op room.  While you are in  surgery your family should notify the waiting room  if they leave the waiting room area and provide a contact phone number.    Please be aware that surgery does come with discomfort.  We want to make every effort to control your discomfort so please discuss any uncontrolled symptoms with your nurse.   Your doctor will most likely have prescribed pain medications.      If you are going home after surgery you will receive individualized written care instructions before being discharged.  A responsible adult must drive you to and from the hospital on the day of your surgery and stay with you for 24 hours.    If you are staying overnight following surgery, you will be transported to your hospital room following the recovery period.  [facility] has all private rooms.    You have received a list of surgical assistants for your reference.  If you have any questions please call Pre-Admission Testing at [phone].  Deductibles and co-payments are collected on the day of service. Please be prepared to pay the required co-pay, deductible or deposit on the day of service as defined by your plan.

## 2019-10-15 ENCOUNTER — ANESTHESIA (OUTPATIENT)
Dept: PERIOP | Facility: HOSPITAL | Age: 64
End: 2019-10-15

## 2019-10-15 ENCOUNTER — HOSPITAL ENCOUNTER (OUTPATIENT)
Facility: HOSPITAL | Age: 64
Setting detail: HOSPITAL OUTPATIENT SURGERY
Discharge: HOME | End: 2019-10-15
Attending: OPHTHALMOLOGY | Admitting: OPHTHALMOLOGY

## 2019-10-15 ENCOUNTER — ANESTHESIA EVENT (OUTPATIENT)
Dept: PERIOP | Facility: HOSPITAL | Age: 64
End: 2019-10-15

## 2019-10-15 VITALS
TEMPERATURE: 97 F | OXYGEN SATURATION: 98 % | HEART RATE: 60 BPM | DIASTOLIC BLOOD PRESSURE: 86 MMHG | SYSTOLIC BLOOD PRESSURE: 135 MMHG | RESPIRATION RATE: 18 BRPM

## 2019-10-15 LAB
GLUCOSE BLDC GLUCOMTR-MCNC: 125 MG/DL (ref 70–130)
GLUCOSE BLDC GLUCOMTR-MCNC: 91 MG/DL (ref 70–130)

## 2019-10-15 PROCEDURE — 25010000002 FENTANYL CITRATE (PF) 100 MCG/2ML SOLUTION: Performed by: ANESTHESIOLOGY

## 2019-10-15 PROCEDURE — 25010000002 MIDAZOLAM PER 1 MG: Performed by: ANESTHESIOLOGY

## 2019-10-15 PROCEDURE — 82962 GLUCOSE BLOOD TEST: CPT

## 2019-10-15 PROCEDURE — 25010000002 PROPOFOL 10 MG/ML EMULSION: Performed by: NURSE ANESTHETIST, CERTIFIED REGISTERED

## 2019-10-15 PROCEDURE — 25010000002 ONDANSETRON PER 1 MG: Performed by: NURSE ANESTHETIST, CERTIFIED REGISTERED

## 2019-10-15 RX ORDER — ACETAMINOPHEN 325 MG/1
650 TABLET ORAL ONCE AS NEEDED
Status: DISCONTINUED | OUTPATIENT
Start: 2019-10-15 | End: 2019-10-15 | Stop reason: HOSPADM

## 2019-10-15 RX ORDER — FENTANYL CITRATE 50 UG/ML
50 INJECTION, SOLUTION INTRAMUSCULAR; INTRAVENOUS
Status: DISCONTINUED | OUTPATIENT
Start: 2019-10-15 | End: 2019-10-15 | Stop reason: HOSPADM

## 2019-10-15 RX ORDER — DIPHENHYDRAMINE HYDROCHLORIDE 50 MG/ML
12.5 INJECTION INTRAMUSCULAR; INTRAVENOUS
Status: DISCONTINUED | OUTPATIENT
Start: 2019-10-15 | End: 2019-10-15 | Stop reason: HOSPADM

## 2019-10-15 RX ORDER — PROMETHAZINE HYDROCHLORIDE 25 MG/ML
12.5 INJECTION, SOLUTION INTRAMUSCULAR; INTRAVENOUS ONCE AS NEEDED
Status: DISCONTINUED | OUTPATIENT
Start: 2019-10-15 | End: 2019-10-15 | Stop reason: HOSPADM

## 2019-10-15 RX ORDER — LIDOCAINE HYDROCHLORIDE 10 MG/ML
0.5 INJECTION, SOLUTION EPIDURAL; INFILTRATION; INTRACAUDAL; PERINEURAL ONCE AS NEEDED
Status: DISCONTINUED | OUTPATIENT
Start: 2019-10-15 | End: 2019-10-15 | Stop reason: HOSPADM

## 2019-10-15 RX ORDER — ONDANSETRON 2 MG/ML
4 INJECTION INTRAMUSCULAR; INTRAVENOUS ONCE AS NEEDED
Status: DISCONTINUED | OUTPATIENT
Start: 2019-10-15 | End: 2019-10-15 | Stop reason: HOSPADM

## 2019-10-15 RX ORDER — EPHEDRINE SULFATE 50 MG/ML
5 INJECTION, SOLUTION INTRAVENOUS ONCE AS NEEDED
Status: DISCONTINUED | OUTPATIENT
Start: 2019-10-15 | End: 2019-10-15 | Stop reason: HOSPADM

## 2019-10-15 RX ORDER — ONDANSETRON 2 MG/ML
INJECTION INTRAMUSCULAR; INTRAVENOUS AS NEEDED
Status: DISCONTINUED | OUTPATIENT
Start: 2019-10-15 | End: 2019-10-15 | Stop reason: SURG

## 2019-10-15 RX ORDER — SODIUM CHLORIDE 0.9 % (FLUSH) 0.9 %
3-10 SYRINGE (ML) INJECTION AS NEEDED
Status: DISCONTINUED | OUTPATIENT
Start: 2019-10-15 | End: 2019-10-15 | Stop reason: HOSPADM

## 2019-10-15 RX ORDER — MAGNESIUM HYDROXIDE 1200 MG/15ML
LIQUID ORAL AS NEEDED
Status: DISCONTINUED | OUTPATIENT
Start: 2019-10-15 | End: 2019-10-15 | Stop reason: HOSPADM

## 2019-10-15 RX ORDER — SCOLOPAMINE TRANSDERMAL SYSTEM 1 MG/1
1 PATCH, EXTENDED RELEASE TRANSDERMAL ONCE
Status: DISCONTINUED | OUTPATIENT
Start: 2019-10-15 | End: 2019-10-15 | Stop reason: HOSPADM

## 2019-10-15 RX ORDER — HYDROMORPHONE HYDROCHLORIDE 1 MG/ML
0.5 INJECTION, SOLUTION INTRAMUSCULAR; INTRAVENOUS; SUBCUTANEOUS
Status: DISCONTINUED | OUTPATIENT
Start: 2019-10-15 | End: 2019-10-15 | Stop reason: HOSPADM

## 2019-10-15 RX ORDER — OXYCODONE AND ACETAMINOPHEN 7.5; 325 MG/1; MG/1
1 TABLET ORAL ONCE AS NEEDED
Status: DISCONTINUED | OUTPATIENT
Start: 2019-10-15 | End: 2019-10-15 | Stop reason: HOSPADM

## 2019-10-15 RX ORDER — PROMETHAZINE HYDROCHLORIDE 25 MG/1
25 TABLET ORAL ONCE AS NEEDED
Status: DISCONTINUED | OUTPATIENT
Start: 2019-10-15 | End: 2019-10-15 | Stop reason: HOSPADM

## 2019-10-15 RX ORDER — ERYTHROMYCIN 5 MG/G
OINTMENT OPHTHALMIC 2 TIMES DAILY
Qty: 3.5 G | Refills: 2 | Status: SHIPPED | OUTPATIENT
Start: 2019-10-15 | End: 2019-10-22

## 2019-10-15 RX ORDER — PROMETHAZINE HYDROCHLORIDE 25 MG/ML
6.25 INJECTION, SOLUTION INTRAMUSCULAR; INTRAVENOUS
Status: DISCONTINUED | OUTPATIENT
Start: 2019-10-15 | End: 2019-10-15 | Stop reason: HOSPADM

## 2019-10-15 RX ORDER — DIPHENHYDRAMINE HCL 25 MG
25 CAPSULE ORAL
Status: DISCONTINUED | OUTPATIENT
Start: 2019-10-15 | End: 2019-10-15 | Stop reason: HOSPADM

## 2019-10-15 RX ORDER — HYDRALAZINE HYDROCHLORIDE 20 MG/ML
5 INJECTION INTRAMUSCULAR; INTRAVENOUS
Status: DISCONTINUED | OUTPATIENT
Start: 2019-10-15 | End: 2019-10-15 | Stop reason: HOSPADM

## 2019-10-15 RX ORDER — SODIUM CHLORIDE, SODIUM LACTATE, POTASSIUM CHLORIDE, CALCIUM CHLORIDE 600; 310; 30; 20 MG/100ML; MG/100ML; MG/100ML; MG/100ML
9 INJECTION, SOLUTION INTRAVENOUS CONTINUOUS
Status: DISCONTINUED | OUTPATIENT
Start: 2019-10-15 | End: 2019-10-15 | Stop reason: HOSPADM

## 2019-10-15 RX ORDER — PROMETHAZINE HYDROCHLORIDE 25 MG/1
25 SUPPOSITORY RECTAL ONCE AS NEEDED
Status: DISCONTINUED | OUTPATIENT
Start: 2019-10-15 | End: 2019-10-15 | Stop reason: HOSPADM

## 2019-10-15 RX ORDER — MIDAZOLAM HYDROCHLORIDE 1 MG/ML
2 INJECTION INTRAMUSCULAR; INTRAVENOUS
Status: DISCONTINUED | OUTPATIENT
Start: 2019-10-15 | End: 2019-10-15 | Stop reason: HOSPADM

## 2019-10-15 RX ORDER — BALANCED SALT SOLUTION 6.4; .75; .48; .3; 3.9; 1.7 MG/ML; MG/ML; MG/ML; MG/ML; MG/ML; MG/ML
SOLUTION OPHTHALMIC AS NEEDED
Status: DISCONTINUED | OUTPATIENT
Start: 2019-10-15 | End: 2019-10-15 | Stop reason: HOSPADM

## 2019-10-15 RX ORDER — ACETAMINOPHEN 650 MG/1
650 SUPPOSITORY RECTAL ONCE AS NEEDED
Status: DISCONTINUED | OUTPATIENT
Start: 2019-10-15 | End: 2019-10-15 | Stop reason: HOSPADM

## 2019-10-15 RX ORDER — MIDAZOLAM HYDROCHLORIDE 1 MG/ML
1 INJECTION INTRAMUSCULAR; INTRAVENOUS
Status: DISCONTINUED | OUTPATIENT
Start: 2019-10-15 | End: 2019-10-15 | Stop reason: HOSPADM

## 2019-10-15 RX ORDER — SODIUM CHLORIDE 0.9 % (FLUSH) 0.9 %
3 SYRINGE (ML) INJECTION EVERY 12 HOURS SCHEDULED
Status: DISCONTINUED | OUTPATIENT
Start: 2019-10-15 | End: 2019-10-15 | Stop reason: HOSPADM

## 2019-10-15 RX ORDER — HYDROCODONE BITARTRATE AND ACETAMINOPHEN 7.5; 325 MG/1; MG/1
1 TABLET ORAL ONCE AS NEEDED
Status: DISCONTINUED | OUTPATIENT
Start: 2019-10-15 | End: 2019-10-15 | Stop reason: HOSPADM

## 2019-10-15 RX ORDER — LABETALOL HYDROCHLORIDE 5 MG/ML
5 INJECTION, SOLUTION INTRAVENOUS
Status: DISCONTINUED | OUTPATIENT
Start: 2019-10-15 | End: 2019-10-15 | Stop reason: HOSPADM

## 2019-10-15 RX ORDER — PROPOFOL 10 MG/ML
VIAL (ML) INTRAVENOUS AS NEEDED
Status: DISCONTINUED | OUTPATIENT
Start: 2019-10-15 | End: 2019-10-15 | Stop reason: SURG

## 2019-10-15 RX ORDER — ERYTHROMYCIN 5 MG/G
OINTMENT OPHTHALMIC AS NEEDED
Status: DISCONTINUED | OUTPATIENT
Start: 2019-10-15 | End: 2019-10-15 | Stop reason: HOSPADM

## 2019-10-15 RX ORDER — NALOXONE HCL 0.4 MG/ML
0.2 VIAL (ML) INJECTION AS NEEDED
Status: DISCONTINUED | OUTPATIENT
Start: 2019-10-15 | End: 2019-10-15 | Stop reason: HOSPADM

## 2019-10-15 RX ORDER — EPHEDRINE SULFATE 50 MG/ML
INJECTION, SOLUTION INTRAVENOUS AS NEEDED
Status: DISCONTINUED | OUTPATIENT
Start: 2019-10-15 | End: 2019-10-15 | Stop reason: SURG

## 2019-10-15 RX ORDER — FLUMAZENIL 0.1 MG/ML
0.2 INJECTION INTRAVENOUS AS NEEDED
Status: DISCONTINUED | OUTPATIENT
Start: 2019-10-15 | End: 2019-10-15 | Stop reason: HOSPADM

## 2019-10-15 RX ORDER — FAMOTIDINE 10 MG/ML
20 INJECTION, SOLUTION INTRAVENOUS ONCE
Status: COMPLETED | OUTPATIENT
Start: 2019-10-15 | End: 2019-10-15

## 2019-10-15 RX ORDER — HYDROCODONE BITARTRATE AND ACETAMINOPHEN 5; 325 MG/1; MG/1
1 TABLET ORAL EVERY 4 HOURS PRN
Qty: 15 TABLET | Refills: 0 | Status: SHIPPED | OUTPATIENT
Start: 2019-10-15 | End: 2022-06-01

## 2019-10-15 RX ADMIN — EPHEDRINE SULFATE 10 MG: 50 INJECTION INTRAMUSCULAR; INTRAVENOUS; SUBCUTANEOUS at 11:52

## 2019-10-15 RX ADMIN — FENTANYL CITRATE 50 MCG: 50 INJECTION INTRAMUSCULAR; INTRAVENOUS at 11:37

## 2019-10-15 RX ADMIN — PROPOFOL 150 MG: 10 INJECTION, EMULSION INTRAVENOUS at 11:38

## 2019-10-15 RX ADMIN — SODIUM CHLORIDE, POTASSIUM CHLORIDE, SODIUM LACTATE AND CALCIUM CHLORIDE 9 ML/HR: 600; 310; 30; 20 INJECTION, SOLUTION INTRAVENOUS at 09:08

## 2019-10-15 RX ADMIN — SCOPOLAMINE 1 PATCH: 1 PATCH TRANSDERMAL at 10:47

## 2019-10-15 RX ADMIN — FAMOTIDINE 20 MG: 10 INJECTION, SOLUTION INTRAVENOUS at 10:48

## 2019-10-15 RX ADMIN — MIDAZOLAM 2 MG: 1 INJECTION INTRAMUSCULAR; INTRAVENOUS at 11:32

## 2019-10-15 RX ADMIN — ONDANSETRON 4 MG: 2 INJECTION INTRAMUSCULAR; INTRAVENOUS at 11:58

## 2019-10-15 NOTE — ANESTHESIA PROCEDURE NOTES
Airway  Urgency: elective    Date/Time: 10/15/2019 11:39 AM  Airway not difficult    General Information and Staff    Patient location during procedure: OR  Anesthesiologist: Mamadou Urban MD  CRNA: Bailey Méndez CRNA    Indications and Patient Condition  Indications for airway management: airway protection    Preoxygenated: yes  MILS maintained throughout      Final Airway Details  Final airway type: supraglottic airway      Successful airway: classic  Size 5    Number of attempts at approach: 1  Assessment: lips, teeth, and gum same as pre-op    Additional Comments  Placed with ease. Vent with ease. Teeth as pre-op. Secured to face.

## 2019-10-15 NOTE — OP NOTE
OPERATIVE NOTE    Patient Identification:  Name: Nash Cannon  Age: 64 y.o.  Sex: male  :  1955  MRN: [id number]                                               Preoperative diagnosis: 1.  Right medial lid retraction and lagophthalmos 2.  Right Christie tube  Postoperative diagnosis: same  Procedure: 1.  Right removal of christie tube 2.  Medial permanent tarsorrhapy  Surgeon: Dr. Jose Daniel Almanzar who was present and scrubbed throughout all critical portions of the operation  Assistants: Alessandro Pineda Jr, MD, Isabella Hernandez MD  Anesthesia: General  EBL: less than 50cc  Specimens:  * No orders in the log *    Description of the procedure: The patient was taken to the operating room and placed on the table in the supine position, where anesthesia was induced. 2% lidocaine with epinephrine and 0.5% marcaine in a 1:1 fashion was injected over the surgical site, and the patient was prepped and draped in the usual manner for orbitofacial surgery.     Corneal protectors were placed in both eyes.    First, the previously placed christie tube was removed without difficulty.  The fistula tract was left to close secondarily.  The decision was made not to place a new Christie tube given his previous history of tube migration.    The medial portions of the upper and lower eyelid were split at the grey line with an 11 bard madeleine blade.  Dissection was then carried out in the supratarsal plane. The dissection was carried laterally enough to achieve the desired amount of eyelid closure. The epithelium was excised from the posterior lamella. The lateral lids were then sutured in a running and interrupted  fashion with 5-0 vicryl in two layers. First the posterior lamella then anterior. This was found to achieve the desired amount of eyelid closure.      The corneal protectors were removed and antibiotic ophthalmic ointment was placed over the surgical site.     The patient was then awakened and taken from the operating room in good  condition, having tolerated the procedure well. There were no complications, and the estimated blood loss was less than 50 cc.

## 2019-10-15 NOTE — H&P
History & Physical       Patient: Nash Cannon    Date of Admission: No admission date for patient encounter.    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 male presents with above. No new meds/health problems since office visit      Allergies: No Known Allergies    10 point review of systems negative, except pertaining to the HPI    Medications:   Home Medications:  No current facility-administered medications on file prior to encounter.      Current Outpatient Medications on File Prior to Encounter   Medication Sig   • dorzolamide (TRUSOPT) 2 % ophthalmic solution Administer 1 drop to the right eye 2 (Two) Times a Day.   • glucose blood test strip Accu-Chek Andra Plus Meter   • levothyroxine (SYNTHROID, LEVOTHROID) 137 MCG tablet Take 137 mcg by mouth Every Morning.     Current Medications:  Scheduled Meds:  Continuous Infusions:  No current facility-administered medications for this encounter.   PRN Meds:.    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  RT EYE MUSCLE RESTRICTED DUE TO SCARR TISSUE FROM RADIATION TX FOR SQUAMOUS CELL   • Eye disorder     RIGHT EYE  SCAR TISSUE DUE TO RADIATION TX'S   • Neck pain on right side    • Numbness and tingling in right hand    • PONV (postoperative nausea and vomiting)    • Sciatica of right side    • Seasonal allergies         Past Surgical History:   Procedure Laterality Date   • CERVICAL FUSION      C4,5,6   • DACRYOCYSTORHINOSTOMY W/ CLARK TUBE Right     3 CLARK TUBE SURGERIES    DUE TO RADIATION TX'S   • ENTROPIAN REPAIR Right 7/24/2018    Procedure: RT LOWER LID SYMBLEPHOROLYSIS  FORNIX  RECONMSTRUCTION  INSERTION OF PROKERA AMNIOTIC MEMBRANE AMNIOTIC MEMBRANE GRAFT FOR FORNIX  MUCUS MEMBRANE GRAFT;  Surgeon: Jose Daniel Almanzar MD;  Location: [facility];  Service: Ophthalmology   • EYE PTOSIS REPAIR Bilateral 5/18/2017    Procedure: EUGENE UPPER LID EYE PTOSIS REPAIR RT LOWER LID LYSIS SYMBLEPHARON.  EXCISION OF CONJUNCTIVOPLASTY LESION FOR BIOPSY FORNIX RECONSTRUCTION WITH AMNIOTIC MEMBRANE GRAFT RIGHT EYE;  Surgeon: Jose Daniel Almanzar MD;  Location: [facility];  Service:    • HERNIA REPAIR     • KNEE ARTHROSCOPY Right    • MOHS SURGERY Right     RIGHT EYE AREA  AFTER SQUAMOUS CELL SURGERY  5 YRS AGO   • ROTATOR CUFF REPAIR Right    • TONSILLECTOMY          Social History     Occupational History   • Not on file   Tobacco Use   • Smoking status: Never Smoker   • Smokeless tobacco: Never Used   Substance and Sexual Activity   • Alcohol use: Yes     Comment: OCC   • Drug use: No   • Sexual activity: Defer    Social History     Social History Narrative   • Not on file        Family History   Problem Relation Age of Onset   • Malig Hyperthermia Neg Hx            Physical Exam   Constitutional: Alert, cooperative, in no acute distress    Head: Normocephalic.   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   Cardiovascular: Normal rate.    Pulmonary/Chest: Effort normal.   Neurological: Alert.   Skin: Skin is warm.   Psychiatric: Normal mood and affect.       Assessment/Plan:  The patient voiced understanding of the risks, benefits, and alternative forms of treatment that were discussed and the patient consents to proceed with RIGHT MEDIAL PERMANENT TARSORRHAPHY, REMOVAL OF CLARK TUBES.       Alessandro Pineda Jr, MD

## 2019-10-15 NOTE — ANESTHESIA POSTPROCEDURE EVALUATION
Patient: Nash Cannon    Procedure Summary     Date:  10/15/19 Room / Location:   [facility] [address] [place] [address] [facility]    Anesthesia Start:  1130 Anesthesia Stop:  1214    Procedure:  RIGHT MEDIAL PERMANENT TARSORRHAPHY, REMOVAL OF CLARK TUBES (Right Eye) Diagnosis:      Surgeon:  Jose Daniel Almanzar MD Provider:  Mamadou Urban MD    Anesthesia Type:  general ASA Status:  3          Anesthesia Type: general  Last vitals  BP   135/86 (10/15/19 1301)   Temp   36.1 °C (97 °F) (10/15/19 1245)   Pulse   60 (10/15/19 1301)   Resp   18 (10/15/19 1301)     SpO2   98 % (10/15/19 1301)     Post Anesthesia Care and Evaluation    Patient location during evaluation: bedside  Patient participation: complete - patient participated  Level of consciousness: awake and alert  Pain management: adequate  Airway patency: patent  Anesthetic complications: No anesthetic complications    Cardiovascular status: acceptable  Respiratory status: acceptable  Hydration status: acceptable    Comments: /86 (BP Location: Right arm, Patient Position: Lying)   Pulse 60   Temp 36.1 °C (97 °F) (Temporal)   Resp 18   SpO2 98%

## 2019-10-15 NOTE — ANESTHESIA PREPROCEDURE EVALUATION
Anesthesia Evaluation     Patient summary reviewed and Nursing notes reviewed   history of anesthetic complications: PONV  NPO Solid Status: > 8 hours  NPO Liquid Status: > 2 hours           Airway   Mallampati: II  TM distance: >3 FB  Neck ROM: limited  No difficulty expected  Dental      Pulmonary - normal exam   (-) not a smoker  Cardiovascular - normal exam    ECG reviewed    Dysrhythmias: incomplete RBBB.      Neuro/Psych  (+) numbness (CTS right hand),     GI/Hepatic/Renal/Endo    (+)   diabetes mellitus (used lantus this AM) type 1 using insulin, hypothyroidism,     Musculoskeletal     (+) neck pain,   Abdominal  - normal exam   Substance History      OB/GYN          Other      history of cancer                    Anesthesia Plan    ASA 3     general   (LMA  Limited neck range of motion with chronic numbness/tingling right arm)  intravenous induction   Anesthetic plan, all risks, benefits, and alternatives have been provided, discussed and informed consent has been obtained with: patient.

## 2019-11-26 ENCOUNTER — APPOINTMENT (OUTPATIENT)
Dept: GENERAL RADIOLOGY | Facility: HOSPITAL | Age: 64
End: 2019-11-26
Attending: EMERGENCY MEDICINE
Payer: COMMERCIAL

## 2019-11-26 ENCOUNTER — HOSPITAL ENCOUNTER (EMERGENCY)
Facility: HOSPITAL | Age: 64
Discharge: HOME | End: 2019-11-26
Attending: EMERGENCY MEDICINE
Payer: COMMERCIAL

## 2019-11-26 ENCOUNTER — APPOINTMENT (OUTPATIENT)
Dept: CT IMAGING | Facility: HOSPITAL | Age: 64
End: 2019-11-26
Attending: EMERGENCY MEDICINE
Payer: COMMERCIAL

## 2019-11-26 VITALS
DIASTOLIC BLOOD PRESSURE: 81 MMHG | WEIGHT: 165 LBS | BODY MASS INDEX: 23.62 KG/M2 | HEART RATE: 84 BPM | TEMPERATURE: 98 F | RESPIRATION RATE: 16 BRPM | OXYGEN SATURATION: 97 % | HEIGHT: 70 IN | SYSTOLIC BLOOD PRESSURE: 130 MMHG

## 2019-11-26 DIAGNOSIS — R10.9 ABDOMINAL PAIN OF UNKNOWN ETIOLOGY: Primary | ICD-10-CM

## 2019-11-26 DIAGNOSIS — R73.9 HYPERGLYCEMIA: ICD-10-CM

## 2019-11-26 DIAGNOSIS — K29.00 ACUTE GASTRITIS WITHOUT HEMORRHAGE, UNSPECIFIED GASTRITIS TYPE: ICD-10-CM

## 2019-11-26 PROCEDURE — 83690 ASSAY OF LIPASE: CPT | Performed by: EMERGENCY MEDICINE

## 2019-11-26 PROCEDURE — C9113 INJ PANTOPRAZOLE SODIUM, VIA: HCPCS | Performed by: EMERGENCY MEDICINE

## 2019-11-26 PROCEDURE — 84484 ASSAY OF TROPONIN QUANT: CPT | Performed by: EMERGENCY MEDICINE

## 2019-11-26 PROCEDURE — 84484 ASSAY OF TROPONIN QUANT: CPT

## 2019-11-26 PROCEDURE — 96374 THER/PROPH/DIAG INJ IV PUSH: CPT

## 2019-11-26 PROCEDURE — 83690 ASSAY OF LIPASE: CPT

## 2019-11-26 PROCEDURE — 85025 COMPLETE CBC W/AUTO DIFF WBC: CPT

## 2019-11-26 PROCEDURE — 80048 BASIC METABOLIC PNL TOTAL CA: CPT

## 2019-11-26 PROCEDURE — 96375 TX/PRO/DX INJ NEW DRUG ADDON: CPT

## 2019-11-26 PROCEDURE — 93010 ELECTROCARDIOGRAM REPORT: CPT | Performed by: EMERGENCY MEDICINE

## 2019-11-26 PROCEDURE — 80076 HEPATIC FUNCTION PANEL: CPT

## 2019-11-26 PROCEDURE — 82962 GLUCOSE BLOOD TEST: CPT

## 2019-11-26 PROCEDURE — 80048 BASIC METABOLIC PNL TOTAL CA: CPT | Performed by: EMERGENCY MEDICINE

## 2019-11-26 PROCEDURE — 93005 ELECTROCARDIOGRAM TRACING: CPT

## 2019-11-26 PROCEDURE — 74176 CT ABD & PELVIS W/O CONTRAST: CPT | Performed by: EMERGENCY MEDICINE

## 2019-11-26 PROCEDURE — 71045 X-RAY EXAM CHEST 1 VIEW: CPT | Performed by: EMERGENCY MEDICINE

## 2019-11-26 PROCEDURE — 80076 HEPATIC FUNCTION PANEL: CPT | Performed by: EMERGENCY MEDICINE

## 2019-11-26 PROCEDURE — 99285 EMERGENCY DEPT VISIT HI MDM: CPT

## 2019-11-26 PROCEDURE — 85025 COMPLETE CBC W/AUTO DIFF WBC: CPT | Performed by: EMERGENCY MEDICINE

## 2019-11-26 RX ORDER — ONDANSETRON 2 MG/ML
4 INJECTION INTRAMUSCULAR; INTRAVENOUS ONCE
Status: COMPLETED | OUTPATIENT
Start: 2019-11-26 | End: 2019-11-26

## 2019-11-26 RX ORDER — ONDANSETRON 2 MG/ML
INJECTION INTRAMUSCULAR; INTRAVENOUS
Status: DISCONTINUED
Start: 2019-11-26 | End: 2019-11-26

## 2019-11-26 RX ORDER — LANSOPRAZOLE 15 MG/1
15 CAPSULE, DELAYED RELEASE ORAL DAILY
Qty: 14 CAPSULE | Refills: 0 | Status: SHIPPED | OUTPATIENT
Start: 2019-11-26 | End: 2019-12-10

## 2019-11-26 RX ORDER — ONDANSETRON 4 MG/1
4 TABLET, ORALLY DISINTEGRATING ORAL EVERY 8 HOURS PRN
Qty: 10 TABLET | Refills: 0 | Status: SHIPPED | OUTPATIENT
Start: 2019-11-26

## 2019-11-26 RX ORDER — HYDROCODONE BITARTRATE AND ACETAMINOPHEN 5; 325 MG/1; MG/1
1 TABLET ORAL EVERY 6 HOURS PRN
Qty: 8 TABLET | Refills: 0 | Status: ON HOLD | OUTPATIENT
Start: 2019-11-26 | End: 2020-02-23

## 2019-11-26 RX ORDER — MORPHINE SULFATE 4 MG/ML
4 INJECTION, SOLUTION INTRAMUSCULAR; INTRAVENOUS ONCE
Status: COMPLETED | OUTPATIENT
Start: 2019-11-26 | End: 2019-11-26

## 2019-11-26 NOTE — ED PROVIDER NOTES
Patient Seen in: [facility] AND [facility] Emergency Department      History   Patient presents with:  Abdomen/Flank Pain (GI/)    Stated Complaint: epigastric and back pain    HPI    15-year-old male with history of reported idiopathic pancreatitis as well as reviewed and negative except as noted above.     Physical Exam     ED Triage Vitals   BP 11/26/19 0251 (!) 161/83   Pulse 11/26/19 0250 96   Resp 11/26/19 0250 20   Temp 11/26/19 0253 98.7 °F (37.1 °C)   Temp src 11/26/19 0253 Temporal   SpO2 11/26/19 0250 Alkaline Phosphatase 163 (*)     All other components within normal limits   CBC W/ DIFFERENTIAL - Abnormal; Notable for the following components:    RDW-SD 46.7 (*)     .0 (*)     All other components within normal limits   TROPONIN I - Normal   LI Cholelithiasis without cholecystitis. Mild DJD and DDD. Moderate atherosclerotic calcifications. The results were faxed/finalized only at . Jennifer 136 ET. If you would like to discuss this case directly please call [phone] (extension [phone]).   If you can't

## 2019-12-21 ENCOUNTER — APPOINTMENT (OUTPATIENT)
Dept: GENERAL RADIOLOGY | Facility: HOSPITAL | Age: 64
DRG: 389 | End: 2019-12-21
Attending: EMERGENCY MEDICINE
Payer: COMMERCIAL

## 2019-12-21 ENCOUNTER — HOSPITAL ENCOUNTER (INPATIENT)
Facility: HOSPITAL | Age: 64
LOS: 1 days | Discharge: HOME | DRG: 389 | End: 2019-12-23
Attending: EMERGENCY MEDICINE | Admitting: HOSPITALIST
Payer: COMMERCIAL

## 2019-12-21 ENCOUNTER — APPOINTMENT (OUTPATIENT)
Dept: CT IMAGING | Facility: HOSPITAL | Age: 64
DRG: 389 | End: 2019-12-21
Attending: EMERGENCY MEDICINE
Payer: COMMERCIAL

## 2019-12-21 DIAGNOSIS — K56.609 SMALL BOWEL OBSTRUCTION (HCC): Primary | ICD-10-CM

## 2019-12-21 LAB
ALBUMIN SERPL-MCNC: 4.3 G/DL (ref 3.4–5)
ALBUMIN/GLOB SERPL: 1.3 {RATIO} (ref 1–2)
ALP LIVER SERPL-CCNC: 148 U/L (ref 45–117)
ALT SERPL-CCNC: 47 U/L (ref 16–61)
ANION GAP SERPL CALC-SCNC: 9 MMOL/L (ref 0–18)
AST SERPL-CCNC: 32 U/L (ref 15–37)
BASOPHILS # BLD AUTO: 0.03 X10(3) UL (ref 0–0.2)
BASOPHILS NFR BLD AUTO: 0.2 %
BILIRUB SERPL-MCNC: 0.6 MG/DL (ref 0.1–2)
BUN BLD-MCNC: 46 MG/DL (ref 7–18)
BUN/CREAT SERPL: 22.5 (ref 10–20)
CALCIUM BLD-MCNC: 9.4 MG/DL (ref 8.5–10.1)
CHLORIDE SERPL-SCNC: 106 MMOL/L (ref 98–112)
CO2 SERPL-SCNC: 25 MMOL/L (ref 21–32)
CREAT BLD-MCNC: 2.04 MG/DL (ref 0.7–1.3)
DEPRECATED RDW RBC AUTO: 45.8 FL (ref 35.1–46.3)
EOSINOPHIL # BLD AUTO: 0.13 X10(3) UL (ref 0–0.7)
EOSINOPHIL NFR BLD AUTO: 0.8 %
ERYTHROCYTE [DISTWIDTH] IN BLOOD BY AUTOMATED COUNT: 14.3 % (ref 11–15)
GLOBULIN PLAS-MCNC: 3.4 G/DL (ref 2.8–4.4)
GLUCOSE BLD-MCNC: 182 MG/DL (ref 70–99)
HCT VFR BLD AUTO: 41.1 % (ref 39–53)
HGB BLD-MCNC: 13.7 G/DL (ref 13–17.5)
IMM GRANULOCYTES # BLD AUTO: 0.08 X10(3) UL (ref 0–1)
IMM GRANULOCYTES NFR BLD: 0.5 %
LIPASE SERPL-CCNC: 167 U/L (ref 73–393)
LYMPHOCYTES # BLD AUTO: 1.17 X10(3) UL (ref 1–4)
LYMPHOCYTES NFR BLD AUTO: 7.2 %
M PROTEIN MFR SERPL ELPH: 7.7 G/DL (ref 6.4–8.2)
MCH RBC QN AUTO: 29.7 PG (ref 26–34)
MCHC RBC AUTO-ENTMCNC: 33.3 G/DL (ref 31–37)
MCV RBC AUTO: 89.2 FL (ref 80–100)
MONOCYTES # BLD AUTO: 0.64 X10(3) UL (ref 0.1–1)
MONOCYTES NFR BLD AUTO: 4 %
NEUTROPHILS # BLD AUTO: 14.12 X10 (3) UL (ref 1.5–7.7)
NEUTROPHILS # BLD AUTO: 14.12 X10(3) UL (ref 1.5–7.7)
NEUTROPHILS NFR BLD AUTO: 87.3 %
OSMOLALITY SERPL CALC.SUM OF ELEC: 307 MOSM/KG (ref 275–295)
PLATELET # BLD AUTO: 144 10(3)UL (ref 150–450)
POTASSIUM SERPL-SCNC: 4.3 MMOL/L (ref 3.5–5.1)
RBC # BLD AUTO: 4.61 X10(6)UL (ref 4.3–5.7)
SODIUM SERPL-SCNC: 140 MMOL/L (ref 136–145)
WBC # BLD AUTO: 16.2 X10(3) UL (ref 4–11)

## 2019-12-21 PROCEDURE — 74176 CT ABD & PELVIS W/O CONTRAST: CPT | Performed by: EMERGENCY MEDICINE

## 2019-12-21 PROCEDURE — 99222 1ST HOSP IP/OBS MODERATE 55: CPT | Performed by: HOSPITALIST

## 2019-12-21 PROCEDURE — 71045 X-RAY EXAM CHEST 1 VIEW: CPT | Performed by: EMERGENCY MEDICINE

## 2019-12-21 RX ORDER — MORPHINE SULFATE 4 MG/ML
4 INJECTION, SOLUTION INTRAMUSCULAR; INTRAVENOUS ONCE
Status: COMPLETED | OUTPATIENT
Start: 2019-12-21 | End: 2019-12-21

## 2019-12-21 RX ORDER — KETOROLAC TROMETHAMINE 15 MG/ML
INJECTION, SOLUTION INTRAMUSCULAR; INTRAVENOUS
Status: COMPLETED
Start: 2019-12-21 | End: 2019-12-21

## 2019-12-21 RX ORDER — KETOROLAC TROMETHAMINE 15 MG/ML
15 INJECTION, SOLUTION INTRAMUSCULAR; INTRAVENOUS ONCE
Status: COMPLETED | OUTPATIENT
Start: 2019-12-21 | End: 2019-12-21

## 2019-12-22 LAB
ANION GAP SERPL CALC-SCNC: 7 MMOL/L (ref 0–18)
BACTERIA UR QL AUTO: NEGATIVE /HPF
BASOPHILS # BLD AUTO: 0.03 X10(3) UL (ref 0–0.2)
BASOPHILS NFR BLD AUTO: 0.2 %
BILIRUB UR QL: NEGATIVE
BUN BLD-MCNC: 44 MG/DL (ref 7–18)
BUN/CREAT SERPL: 21.9 (ref 10–20)
CALCIUM BLD-MCNC: 8.3 MG/DL (ref 8.5–10.1)
CHLORIDE SERPL-SCNC: 112 MMOL/L (ref 98–112)
CLARITY UR: CLEAR
CO2 SERPL-SCNC: 24 MMOL/L (ref 21–32)
COLOR UR: YELLOW
CREAT BLD-MCNC: 2.01 MG/DL (ref 0.7–1.3)
DEPRECATED RDW RBC AUTO: 46.4 FL (ref 35.1–46.3)
EOSINOPHIL # BLD AUTO: 0.02 X10(3) UL (ref 0–0.7)
EOSINOPHIL NFR BLD AUTO: 0.1 %
ERYTHROCYTE [DISTWIDTH] IN BLOOD BY AUTOMATED COUNT: 14.3 % (ref 11–15)
EST. AVERAGE GLUCOSE BLD GHB EST-MCNC: 148 MG/DL (ref 68–126)
GLUCOSE BLD-MCNC: 142 MG/DL (ref 70–99)
GLUCOSE BLDC GLUCOMTR-MCNC: 126 MG/DL (ref 70–99)
GLUCOSE BLDC GLUCOMTR-MCNC: 129 MG/DL (ref 70–99)
GLUCOSE BLDC GLUCOMTR-MCNC: 147 MG/DL (ref 70–99)
GLUCOSE BLDC GLUCOMTR-MCNC: 153 MG/DL (ref 70–99)
GLUCOSE UR-MCNC: NEGATIVE MG/DL
HBA1C MFR BLD HPLC: 6.8 % (ref ?–5.7)
HCT VFR BLD AUTO: 36.3 % (ref 39–53)
HGB BLD-MCNC: 12.3 G/DL (ref 13–17.5)
IMM GRANULOCYTES # BLD AUTO: 0.06 X10(3) UL (ref 0–1)
IMM GRANULOCYTES NFR BLD: 0.4 %
LEUKOCYTE ESTERASE UR QL STRIP.AUTO: NEGATIVE
LYMPHOCYTES # BLD AUTO: 0.53 X10(3) UL (ref 1–4)
LYMPHOCYTES NFR BLD AUTO: 3.4 %
MCH RBC QN AUTO: 30.4 PG (ref 26–34)
MCHC RBC AUTO-ENTMCNC: 33.9 G/DL (ref 31–37)
MCV RBC AUTO: 89.9 FL (ref 80–100)
MONOCYTES # BLD AUTO: 1.06 X10(3) UL (ref 0.1–1)
MONOCYTES NFR BLD AUTO: 6.7 %
NEUTROPHILS # BLD AUTO: 14.01 X10 (3) UL (ref 1.5–7.7)
NEUTROPHILS # BLD AUTO: 14.01 X10(3) UL (ref 1.5–7.7)
NEUTROPHILS NFR BLD AUTO: 89.2 %
NITRITE UR QL STRIP.AUTO: NEGATIVE
OSMOLALITY SERPL CALC.SUM OF ELEC: 310 MOSM/KG (ref 275–295)
PH UR: 5 [PH] (ref 5–8)
PLATELET # BLD AUTO: 116 10(3)UL (ref 150–450)
POTASSIUM SERPL-SCNC: 4.5 MMOL/L (ref 3.5–5.1)
PROT UR-MCNC: NEGATIVE MG/DL
RBC # BLD AUTO: 4.04 X10(6)UL (ref 4.3–5.7)
RBC #/AREA URNS AUTO: 3 /HPF
SODIUM SERPL-SCNC: 143 MMOL/L (ref 136–145)
SP GR UR STRIP: 1.01 (ref 1–1.03)
UROBILINOGEN UR STRIP-ACNC: <2
WBC # BLD AUTO: 15.7 X10(3) UL (ref 4–11)
WBC #/AREA URNS AUTO: 1 /HPF

## 2019-12-22 PROCEDURE — 99222 1ST HOSP IP/OBS MODERATE 55: CPT | Performed by: SURGERY

## 2019-12-22 PROCEDURE — 99233 SBSQ HOSP IP/OBS HIGH 50: CPT | Performed by: HOSPITALIST

## 2019-12-22 RX ORDER — DEXTROSE MONOHYDRATE 25 G/50ML
50 INJECTION, SOLUTION INTRAVENOUS AS NEEDED
Status: DISCONTINUED | OUTPATIENT
Start: 2019-12-22 | End: 2019-12-23

## 2019-12-22 RX ORDER — ALLOPURINOL 300 MG/1
300 TABLET ORAL DAILY
Status: DISCONTINUED | OUTPATIENT
Start: 2019-12-22 | End: 2019-12-23

## 2019-12-22 RX ORDER — SODIUM CHLORIDE AND POTASSIUM CHLORIDE .9; .15 G/100ML; G/100ML
SOLUTION INTRAVENOUS CONTINUOUS
Status: DISCONTINUED | OUTPATIENT
Start: 2019-12-22 | End: 2019-12-23

## 2019-12-22 RX ORDER — MORPHINE SULFATE 4 MG/ML
4 INJECTION, SOLUTION INTRAMUSCULAR; INTRAVENOUS EVERY 2 HOUR PRN
Status: DISCONTINUED | OUTPATIENT
Start: 2019-12-22 | End: 2019-12-23

## 2019-12-22 RX ORDER — ONDANSETRON 2 MG/ML
4 INJECTION INTRAMUSCULAR; INTRAVENOUS EVERY 6 HOURS PRN
Status: DISCONTINUED | OUTPATIENT
Start: 2019-12-22 | End: 2019-12-23

## 2019-12-22 RX ORDER — ROSUVASTATIN CALCIUM 5 MG/1
5 TABLET, COATED ORAL NIGHTLY
Status: DISCONTINUED | OUTPATIENT
Start: 2019-12-23 | End: 2019-12-23

## 2019-12-22 RX ORDER — HEPARIN SODIUM 5000 [USP'U]/ML
5000 INJECTION, SOLUTION INTRAVENOUS; SUBCUTANEOUS EVERY 12 HOURS SCHEDULED
Status: DISCONTINUED | OUTPATIENT
Start: 2019-12-22 | End: 2019-12-23

## 2019-12-22 RX ORDER — LISINOPRIL 40 MG/1
40 TABLET ORAL DAILY
Status: DISCONTINUED | OUTPATIENT
Start: 2019-12-23 | End: 2019-12-22

## 2019-12-22 RX ORDER — HYDRALAZINE HYDROCHLORIDE 20 MG/ML
10 INJECTION INTRAMUSCULAR; INTRAVENOUS EVERY 4 HOURS PRN
Status: DISCONTINUED | OUTPATIENT
Start: 2019-12-22 | End: 2019-12-23

## 2019-12-22 RX ORDER — AMLODIPINE BESYLATE 5 MG/1
5 TABLET ORAL DAILY
Status: DISCONTINUED | OUTPATIENT
Start: 2019-12-23 | End: 2019-12-22

## 2019-12-22 RX ORDER — LISINOPRIL 40 MG/1
40 TABLET ORAL DAILY
Status: DISCONTINUED | OUTPATIENT
Start: 2019-12-22 | End: 2019-12-23

## 2019-12-22 RX ORDER — HYDROCHLOROTHIAZIDE 12.5 MG/1
12.5 TABLET ORAL DAILY
Status: ON HOLD | COMMUNITY
End: 2019-12-22

## 2019-12-22 RX ORDER — AMLODIPINE BESYLATE 5 MG/1
5 TABLET ORAL DAILY
Status: DISCONTINUED | OUTPATIENT
Start: 2019-12-22 | End: 2019-12-23

## 2019-12-22 RX ORDER — LEVOTHYROXINE SODIUM 0.15 MG/1
150 TABLET ORAL
Status: DISCONTINUED | OUTPATIENT
Start: 2019-12-23 | End: 2019-12-23

## 2019-12-22 RX ORDER — MORPHINE SULFATE 2 MG/ML
2 INJECTION, SOLUTION INTRAMUSCULAR; INTRAVENOUS EVERY 2 HOUR PRN
Status: DISCONTINUED | OUTPATIENT
Start: 2019-12-22 | End: 2019-12-23

## 2019-12-22 NOTE — PROGRESS NOTES
[facility]    Progress Note    [facility]:  [facility]    3/16/1955 MRN [id number]   Location [facility] 4W/SW/SE Attending Joyce Davidson MD   Hosp Day # 0 PCP PHYSICIAN NONSTAFF       Subjective:   Sixto Peterson 12/23/2019] amLODIPine Besylate  5 mg Oral Daily   • [START ON 12/23/2019] Levothyroxine Sodium  175 mcg Oral Before breakfast   • [START ON 12/23/2019] lisinopril  40 mg Oral Daily   • [START ON 12/23/2019] Rosuvastatin Calcium  5 mg Oral Nightly   • [STA drainable intra-abdominal fluid collection accounting for limitations of noncontrast CT technique. 2. New ground-glass density reticulonodular airspace disease involving the left lower lobe and lingula.   This finding is suspicious for pneumonia/aspiration hypertension  Blood pressure well controlled, oral meds currently on hold.   Will use IV hydralazine as needed for systolic pressures greater than 160.     Chronic kidney disease stage III-IV  Avoid nephrotoxic medications     Elevated blood sugars  We will

## 2019-12-22 NOTE — PROGRESS NOTES
[facility] Dosing Service  Antibiotic Dosing    Tiki Jeff is a 59year old male for whom pharmacy is dosing Zosyn for treatment of  pneumonia. .  Allergies: is allergic to droperidol.     Vitals: /74 (BP Location: Left arm)   Pulse 112   Temp

## 2019-12-22 NOTE — SLP NOTE
Speech Pathology    Order received, chart reviewed. D/w RN - patient continues with NG to LIS, no plans to DC as of yet. Will hold and follow up 12/23 and proceed with BSSE pending removal of NG per orders.     Vandana Pike M.S., JILLIAN-SLP  Speech-Langu

## 2019-12-22 NOTE — ED INITIAL ASSESSMENT (HPI)
Pt here for worsening RLQ abd pain since noon today +n/v. Hx bowel obstructions and colon ca.  Pt was given fentanyl 70mcg IVP and zofran 4mg IVP by EMS

## 2019-12-22 NOTE — ED PROVIDER NOTES
Patient Seen in: [facility] AND [facility] Emergency Department      History   Patient presents with:  Abdomen/Flank Pain  Nausea/Vomiting/Diarrhea    Stated Complaint: abd pain    HPI    [id number] male with complicated surgical history including partial colec Oral   SpO2 97 %   O2 Device None (Room air)       Current:/81   Pulse 100   Temp 97.4 °F (36.3 °C) (Oral)   Resp 18   Wt 72.6 kg   SpO2 99%   BMI 22.96 kg/m²         Physical Exam  Vitals signs and nursing note reviewed.    Constitutional:       Appe DIFFERENTIAL[[id number]]          Abnormal            Final result                 Please view results for these tests on the individual orders.    RAINBOW DRAW BLUE   RAINBOW DRAW LAVENDER   RAINBOW DRAW LIGHT GREEN   RAINBOW DRAW GOLD   CT ABDOMEN AND PELV Prescribed:  Current Discharge Medication List                   Present on Admission  Date Reviewed: 2/18/2018          ICD-10-CM Noted POA    [facility] K56.609 6/16/2017 Unknown

## 2019-12-22 NOTE — H&P
[facility] Patient Status:  Emergency    3/16/1955 MRN [id number]   Location [address] Attending Tawny Hdez MD   [address] Day # 0 PCP PHYSICIAN NONSTAFF     Date:   use.    Allergies:    Droperidol              HIVES    Home Medications:  Prior to Admission Medications   Prescriptions Last Dose Informant Patient Reported? Taking?    AmLODIPine Besylate (NORVASC) 5 MG Oral Tab Taking  Yes Yes   Sig: Take 5 mg by mouth d Negative. Musculoskeletal:  Negative. Integumentary:  Negative. Neurologic:  Negative. Psychiatric:  Negative.   ROS reviewed as documented in chart    Physical Exam:  Temp:  [97.4 °F (36.3 °C)] 97.4 °F (36.3 °C)  Pulse:  [100] 100  Resp:  [18-20] 18  B n.p.o. for now. Monitor I's and O's. Will use morphine as needed for pain, Zofran for nausea. Surgery has been consulted. Benign hypertension  Blood pressure well controlled, oral meds currently on hold.   Will use IV hydralazine as needed for systoli

## 2019-12-22 NOTE — PLAN OF CARE
Pt arrived to floor around midnight. He is alert and oriented. NG to LIS. NPO IVF. He complains of mild pain to abdomen that he stats has improved since arrival. Call light within reach, using appropriately.     Problem: Patient/Family Goals  Goal: Patient/ non-pharmacological measures as appropriate and evaluate response  - Consider cultural and social influences on pain and pain management  - Manage/alleviate anxiety  - Utilize distraction and/or relaxation techniques  - Monitor for opioid side effects  - N

## 2019-12-22 NOTE — CONSULTS
[facility]    PATIENT'S NAME: Delia Mendez   ATTENDING PHYSICIAN: Mónica Leon MD   CONSULTING PHYSICIAN: Mina Ramirez MD   PATIENT ACCOUNT#:   [id number]    LOCATION:  [address] [place] [address] #:   [id number]       DATE OF BIR laparotomy for recurrent lymphoma, repeat laparotomy for lymphoma, tonsillectomy, vasectomy, bilateral cataract extraction. MEDICATIONS:  On admission, see chart. ALLERGIES:  Droperidol. SOCIAL HISTORY:  He is  and has 2 children.   He has 1 adhesive small-bowel obstructions in the past, treated conservatively. He again had fairly typical symptoms beginning yesterday afternoon, which are now improved with nasogastric decompression and IV hydration.      PLAN:  Will continue observation for tod

## 2019-12-22 NOTE — PLAN OF CARE
Ng tube removed, tolerating clear liquids, advance diet in am, ambulating in hallway, monitoring blood sugars, ua sent, started on zosyn for possible PNA. Plan for speech eval tomorrow.       Problem: Patient/Family Goals  Goal: [facility] as appropriate and evaluate response  - Consider cultural and social influences on pain and pain management  - Manage/alleviate anxiety  - Utilize distraction and/or relaxation techniques  - Monitor for opioid side effects  - Notify MD/LIP if interventions

## 2019-12-23 VITALS
HEART RATE: 98 BPM | DIASTOLIC BLOOD PRESSURE: 82 MMHG | HEIGHT: 70 IN | WEIGHT: 160.38 LBS | SYSTOLIC BLOOD PRESSURE: 131 MMHG | OXYGEN SATURATION: 99 % | TEMPERATURE: 98 F | RESPIRATION RATE: 18 BRPM | BODY MASS INDEX: 22.96 KG/M2

## 2019-12-23 LAB
ANION GAP SERPL CALC-SCNC: 5 MMOL/L (ref 0–18)
BASOPHILS # BLD AUTO: 0.02 X10(3) UL (ref 0–0.2)
BASOPHILS NFR BLD AUTO: 0.3 %
BUN BLD-MCNC: 32 MG/DL (ref 7–18)
BUN/CREAT SERPL: 17 (ref 10–20)
CALCIUM BLD-MCNC: 7.9 MG/DL (ref 8.5–10.1)
CHLORIDE SERPL-SCNC: 115 MMOL/L (ref 98–112)
CO2 SERPL-SCNC: 23 MMOL/L (ref 21–32)
CREAT BLD-MCNC: 1.88 MG/DL (ref 0.7–1.3)
DEPRECATED RDW RBC AUTO: 48.6 FL (ref 35.1–46.3)
EOSINOPHIL # BLD AUTO: 0.17 X10(3) UL (ref 0–0.7)
EOSINOPHIL NFR BLD AUTO: 2.1 %
ERYTHROCYTE [DISTWIDTH] IN BLOOD BY AUTOMATED COUNT: 14.5 % (ref 11–15)
GLUCOSE BLD-MCNC: 102 MG/DL (ref 70–99)
GLUCOSE BLDC GLUCOMTR-MCNC: 106 MG/DL (ref 70–99)
GLUCOSE BLDC GLUCOMTR-MCNC: 114 MG/DL (ref 70–99)
GLUCOSE BLDC GLUCOMTR-MCNC: 162 MG/DL (ref 70–99)
HAV IGM SER QL: 1.6 MG/DL (ref 1.6–2.6)
HCT VFR BLD AUTO: 31.7 % (ref 39–53)
HGB BLD-MCNC: 10.2 G/DL (ref 13–17.5)
IMM GRANULOCYTES # BLD AUTO: 0.02 X10(3) UL (ref 0–1)
IMM GRANULOCYTES NFR BLD: 0.3 %
LYMPHOCYTES # BLD AUTO: 1.06 X10(3) UL (ref 1–4)
LYMPHOCYTES NFR BLD AUTO: 13.3 %
MCH RBC QN AUTO: 29.8 PG (ref 26–34)
MCHC RBC AUTO-ENTMCNC: 32.2 G/DL (ref 31–37)
MCV RBC AUTO: 92.7 FL (ref 80–100)
MONOCYTES # BLD AUTO: 0.68 X10(3) UL (ref 0.1–1)
MONOCYTES NFR BLD AUTO: 8.6 %
NEUTROPHILS # BLD AUTO: 6 X10 (3) UL (ref 1.5–7.7)
NEUTROPHILS # BLD AUTO: 6 X10(3) UL (ref 1.5–7.7)
NEUTROPHILS NFR BLD AUTO: 75.4 %
OSMOLALITY SERPL CALC.SUM OF ELEC: 303 MOSM/KG (ref 275–295)
PLATELET # BLD AUTO: 94 10(3)UL (ref 150–450)
POTASSIUM SERPL-SCNC: 4.7 MMOL/L (ref 3.5–5.1)
RBC # BLD AUTO: 3.42 X10(6)UL (ref 4.3–5.7)
SODIUM SERPL-SCNC: 143 MMOL/L (ref 136–145)
WBC # BLD AUTO: 8 X10(3) UL (ref 4–11)

## 2019-12-23 PROCEDURE — 99239 HOSP IP/OBS DSCHRG MGMT >30: CPT | Performed by: HOSPITALIST

## 2019-12-23 PROCEDURE — 99231 SBSQ HOSP IP/OBS SF/LOW 25: CPT | Performed by: SURGERY

## 2019-12-23 RX ORDER — ACETAMINOPHEN 325 MG/1
650 TABLET ORAL EVERY 4 HOURS PRN
Status: DISCONTINUED | OUTPATIENT
Start: 2019-12-23 | End: 2019-12-23

## 2019-12-23 RX ORDER — MAGNESIUM OXIDE 400 MG (241.3 MG MAGNESIUM) TABLET
400 TABLET ONCE
Status: COMPLETED | OUTPATIENT
Start: 2019-12-23 | End: 2019-12-23

## 2019-12-23 NOTE — PAYOR COMM NOTE
--------------  ADMISSION REVIEW     Payor: [facility] #:  [id number]  Authorization Number: N/A    ED Provider Notes             Patient Seen in: [facility] Emergency Department      History   Patient presents w Current:/81   Pulse 100   Temp 97.4 °F (36.3 °C) (Oral)   Resp 18   Wt 72.6 kg   SpO2 99%   BMI 22.96 kg/m²          Physical Exam  Vitals signs and nursing note reviewed. Constitutional:       Appearance: He is well-developed.    HENT:      H Final result                 Please view results for these tests on the individual orders.    RAINBOW DRAW BLUE   RAINBOW DRAW LAVENDER   RAINBOW DRAW LIGHT GREEN   RAINBOW DRAW GOLD           CT ABDOMEN AND PELVIS WITHOUT CONTRAST    IMPRESSION:    Newly [place] Systems:  Constitutional:  Weakness, Fatigue. Eye:  Negative. Ear/Nose/Mouth/Throat:  Negative. Respiratory:  Negative  Cardiovascular: Negative  Gastrointestinal: Abdominal pain, nausea and vomiting  Genitourinary:  Negative  Endocrine:  Negative.   Imm TP 7.7 12/21/2019    AST 32 12/21/2019    ALT 47 12/21/2019     12/21/2019       Assessment and Plan:    Small bowel obstruction  As confirmed by CT scan, NG to low intermittent suction, IV fluids have been initiated, patient will remain n.p.o. f patient will remain n.p.o. for now.  Monitor I's and O's.  Will use morphine as needed for pain, Zofran for nausea.  Surgery has been consulted.   Possibly may have NG removed later today.       Benign hypertension  Blood pressure well controlled, oral meds

## 2019-12-23 NOTE — SLP NOTE
ADULT SWALLOWING EVALUATION    ASSESSMENT    ASSESSMENT/OVERALL IMPRESSION:  Orders rec'd, chart reviewed. SLP collaborated with RN prior to seeing patient. Pt with recent CT results which demonstrated suspicion for aspiration upon this admission.  Therefor obstruction [facility])      Past Medical History  Past Medical History:   Diagnosis Date   • Bilateral cataracts     bilateral cataract surgery   • Bowel obstruction ([facility]    • Cataract    • Colon cancer ([facility]     • Essential hypertension    • H/O bone marrow t Up Needed: Yes  SLP Follow-up Date: N/A    Thank you for your referral.   Thee Mehta MA, [address] Pathologist  [place]. [phone]    If you have any questions, please contact Alexey Dawson

## 2019-12-23 NOTE — PLAN OF CARE
Pt is alert and oriented. Tolerating clear liquid diet. Denies any N&V. Pt had a small BM this shift. Passing gas. Monitoring blood glucose. IVF, zosyn for possible PNA. Denies any SOB. Plan for speech eval today. Call light within reach.     Problem: [place] response  - Implement non-pharmacological measures as appropriate and evaluate response  - Consider cultural and social influences on pain and pain management  - Manage/alleviate anxiety  - Utilize distraction and/or relaxation techniques  - Monitor for op

## 2019-12-23 NOTE — PLAN OF CARE
A&Ox4. Up at seng. Advanced to GI soft per Dr. Chris Madera, tolerating well. Voiding freely. Passing flatus. Pt denies any complaints at this time. Swallow evaluation performed, pt cleared by SLP. Home with daughter and wife.  Pt given d/c instructions and handou ADULT  Goal: Verbalizes/displays adequate comfort level or patient's stated pain goal  Description  INTERVENTIONS:  - Encourage pt to monitor pain and request assistance  - Assess pain using appropriate pain scale  - Administer analgesics based on type and care  Description  Interventions:  - What would you like us to know as we care for you?   - Provide timely, complete, and accurate information to patient/family  - Incorporate patient and family knowledge, values, beliefs, and cultural backgrounds into the

## 2019-12-23 NOTE — DISCHARGE SUMMARY
[facility] HOSPITALIST  DISCHARGE SUMMARY     Belinda Song Patient Status:  Inpatient    3/16/1955 MRN [id number]   Location [facility] 4W/SW/SE Attending Christopher Arias MD   Hosp Day # 1 PCP PHYSICIAN NONSTAFF     DATE OF ADMISSION: 2019 showed very slight microscopic hematuria. No symptoms. I have asked him to follow-up with his primary care physician for repeat chest imaging and repeat urinalysis.     Patient understands return to the emergency room for increased pain, fever, discharge, daily. Refills:  0     NORVASC 5 MG Tabs  Generic drug:  amLODIPine Besylate      Take 5 mg by mouth daily.    Refills:  0     ondansetron 4 MG Tbdp  Commonly known as:  ZOFRAN-ODT      Take 1 tablet (4 mg total) by mouth every 8 (eight) hours as needed f

## 2019-12-23 NOTE — PROGRESS NOTES
[facility]    Progress Note    Ta Score Patient Status:  Inpatient    3/16/1955 MRN [id number]   Location [facility] 4W/SW/SE Attending Baltazar Lloyd MD   Hosp Day # 1 PCP PHYSICIAN NONSTAFF     Assessment and Plan:     Yessica 12/21/2019    AST 32 12/21/2019    ALT 47 12/21/2019    T4F 1.10 01/19/2017    TSH 0.19 (L) 01/19/2017    JOSIE 112 (H) 12/20/2017     12/21/2019    MG 1.6 12/23/2019    TROP <0.045 11/26/2019       Ct Abdomen+pelvis(cpt=74176)    Result Date: 12/22/2 fracture. Chronic left rib fractures. 4. Lesser incidental findings as above. A preliminary report was issued by the [address] [place] [address] Radiology teleradiology service. There are no major discrepancies.   Dictated by (CST): Lorrie Whaley MD on 12/22/2019 at 8:58

## 2020-02-21 ENCOUNTER — HOSPITAL ENCOUNTER (INPATIENT)
Facility: HOSPITAL | Age: 65
LOS: 1 days | Discharge: HOME | DRG: 389 | End: 2020-02-23
Attending: EMERGENCY MEDICINE | Admitting: HOSPITALIST
Payer: COMMERCIAL

## 2020-02-21 ENCOUNTER — APPOINTMENT (OUTPATIENT)
Dept: CT IMAGING | Facility: HOSPITAL | Age: 65
DRG: 389 | End: 2020-02-21
Attending: EMERGENCY MEDICINE
Payer: COMMERCIAL

## 2020-02-21 DIAGNOSIS — N17.9 ACUTE KIDNEY INJURY (HCC): Primary | ICD-10-CM

## 2020-02-21 DIAGNOSIS — K56.609 SMALL BOWEL OBSTRUCTION (HCC): ICD-10-CM

## 2020-02-21 LAB
ALBUMIN SERPL-MCNC: 4.4 G/DL (ref 3.4–5)
ALP LIVER SERPL-CCNC: 148 U/L (ref 45–117)
ALT SERPL-CCNC: 39 U/L (ref 16–61)
ANION GAP SERPL CALC-SCNC: 8 MMOL/L (ref 0–18)
AST SERPL-CCNC: 21 U/L (ref 15–37)
BASOPHILS # BLD AUTO: 0.06 X10(3) UL (ref 0–0.2)
BASOPHILS NFR BLD AUTO: 0.4 %
BILIRUB DIRECT SERPL-MCNC: 0.2 MG/DL (ref 0–0.2)
BILIRUB SERPL-MCNC: 0.7 MG/DL (ref 0.1–2)
BUN BLD-MCNC: 39 MG/DL (ref 7–18)
BUN/CREAT SERPL: 17.6 (ref 10–20)
CALCIUM BLD-MCNC: 9.9 MG/DL (ref 8.5–10.1)
CHLORIDE SERPL-SCNC: 108 MMOL/L (ref 98–112)
CO2 SERPL-SCNC: 24 MMOL/L (ref 21–32)
CREAT BLD-MCNC: 2.22 MG/DL (ref 0.7–1.3)
DEPRECATED RDW RBC AUTO: 48 FL (ref 35.1–46.3)
EOSINOPHIL # BLD AUTO: 0.03 X10(3) UL (ref 0–0.7)
EOSINOPHIL NFR BLD AUTO: 0.2 %
ERYTHROCYTE [DISTWIDTH] IN BLOOD BY AUTOMATED COUNT: 15 % (ref 11–15)
GLUCOSE BLD-MCNC: 156 MG/DL (ref 70–99)
HCT VFR BLD AUTO: 40.5 % (ref 39–53)
HGB BLD-MCNC: 13.6 G/DL (ref 13–17.5)
IMM GRANULOCYTES # BLD AUTO: 0.07 X10(3) UL (ref 0–1)
IMM GRANULOCYTES NFR BLD: 0.4 %
LIPASE SERPL-CCNC: 148 U/L (ref 73–393)
LYMPHOCYTES # BLD AUTO: 0.89 X10(3) UL (ref 1–4)
LYMPHOCYTES NFR BLD AUTO: 5.6 %
M PROTEIN MFR SERPL ELPH: 8.1 G/DL (ref 6.4–8.2)
MCH RBC QN AUTO: 29.6 PG (ref 26–34)
MCHC RBC AUTO-ENTMCNC: 33.6 G/DL (ref 31–37)
MCV RBC AUTO: 88.2 FL (ref 80–100)
MONOCYTES # BLD AUTO: 0.63 X10(3) UL (ref 0.1–1)
MONOCYTES NFR BLD AUTO: 4 %
NEUTROPHILS # BLD AUTO: 14.14 X10 (3) UL (ref 1.5–7.7)
NEUTROPHILS # BLD AUTO: 14.14 X10(3) UL (ref 1.5–7.7)
NEUTROPHILS NFR BLD AUTO: 89.4 %
OSMOLALITY SERPL CALC.SUM OF ELEC: 303 MOSM/KG (ref 275–295)
PLATELET # BLD AUTO: 166 10(3)UL (ref 150–450)
POTASSIUM SERPL-SCNC: 4 MMOL/L (ref 3.5–5.1)
RBC # BLD AUTO: 4.59 X10(6)UL (ref 4.3–5.7)
SODIUM SERPL-SCNC: 140 MMOL/L (ref 136–145)
WBC # BLD AUTO: 15.8 X10(3) UL (ref 4–11)

## 2020-02-21 PROCEDURE — 74177 CT ABD & PELVIS W/CONTRAST: CPT | Performed by: EMERGENCY MEDICINE

## 2020-02-21 RX ORDER — MORPHINE SULFATE 4 MG/ML
4 INJECTION, SOLUTION INTRAMUSCULAR; INTRAVENOUS ONCE
Status: COMPLETED | OUTPATIENT
Start: 2020-02-21 | End: 2020-02-21

## 2020-02-21 RX ORDER — ONDANSETRON 2 MG/ML
4 INJECTION INTRAMUSCULAR; INTRAVENOUS ONCE
Status: COMPLETED | OUTPATIENT
Start: 2020-02-21 | End: 2020-02-21

## 2020-02-22 ENCOUNTER — APPOINTMENT (OUTPATIENT)
Dept: GENERAL RADIOLOGY | Facility: HOSPITAL | Age: 65
DRG: 389 | End: 2020-02-22
Attending: SURGERY
Payer: COMMERCIAL

## 2020-02-22 PROBLEM — N17.9 ACUTE KIDNEY INJURY (HCC): Status: ACTIVE | Noted: 2020-02-22

## 2020-02-22 PROBLEM — N17.9 ACUTE KIDNEY INJURY: Status: ACTIVE | Noted: 2020-02-22

## 2020-02-22 LAB
HAV IGM SER QL: 1.6 MG/DL (ref 1.6–2.6)
HAV IGM SER QL: 1.6 MG/DL (ref 1.6–2.6)
HAV IGM SER QL: 2.2 MG/DL (ref 1.6–2.6)

## 2020-02-22 PROCEDURE — 71045 X-RAY EXAM CHEST 1 VIEW: CPT | Performed by: SURGERY

## 2020-02-22 PROCEDURE — 99223 1ST HOSP IP/OBS HIGH 75: CPT | Performed by: SURGERY

## 2020-02-22 PROCEDURE — 99222 1ST HOSP IP/OBS MODERATE 55: CPT | Performed by: HOSPITALIST

## 2020-02-22 RX ORDER — LEVOTHYROXINE SODIUM 0.15 MG/1
150 TABLET ORAL
COMMUNITY

## 2020-02-22 RX ORDER — HYDROMORPHONE HYDROCHLORIDE 1 MG/ML
0.3 INJECTION, SOLUTION INTRAMUSCULAR; INTRAVENOUS; SUBCUTANEOUS EVERY 4 HOURS PRN
Status: DISCONTINUED | OUTPATIENT
Start: 2020-02-22 | End: 2020-02-22

## 2020-02-22 RX ORDER — MORPHINE SULFATE 4 MG/ML
1 INJECTION, SOLUTION INTRAMUSCULAR; INTRAVENOUS EVERY 4 HOURS PRN
Status: DISCONTINUED | OUTPATIENT
Start: 2020-02-22 | End: 2020-02-22

## 2020-02-22 RX ORDER — ONDANSETRON 2 MG/ML
4 INJECTION INTRAMUSCULAR; INTRAVENOUS EVERY 6 HOURS PRN
Status: DISCONTINUED | OUTPATIENT
Start: 2020-02-22 | End: 2020-02-23

## 2020-02-22 RX ORDER — MORPHINE SULFATE 4 MG/ML
2 INJECTION, SOLUTION INTRAMUSCULAR; INTRAVENOUS EVERY 4 HOURS PRN
Status: DISCONTINUED | OUTPATIENT
Start: 2020-02-22 | End: 2020-02-22

## 2020-02-22 RX ORDER — MORPHINE SULFATE 4 MG/ML
4 INJECTION, SOLUTION INTRAMUSCULAR; INTRAVENOUS EVERY 4 HOURS PRN
Status: DISCONTINUED | OUTPATIENT
Start: 2020-02-22 | End: 2020-02-23

## 2020-02-22 RX ORDER — MORPHINE SULFATE 2 MG/ML
2 INJECTION, SOLUTION INTRAMUSCULAR; INTRAVENOUS ONCE
Status: COMPLETED | OUTPATIENT
Start: 2020-02-22 | End: 2020-02-22

## 2020-02-22 RX ORDER — LORAZEPAM 2 MG/ML
1 INJECTION INTRAMUSCULAR ONCE
Status: DISCONTINUED | OUTPATIENT
Start: 2020-02-22 | End: 2020-02-22

## 2020-02-22 RX ORDER — HYDROMORPHONE HYDROCHLORIDE 1 MG/ML
0.5 INJECTION, SOLUTION INTRAMUSCULAR; INTRAVENOUS; SUBCUTANEOUS EVERY 4 HOURS PRN
Status: DISCONTINUED | OUTPATIENT
Start: 2020-02-22 | End: 2020-02-22

## 2020-02-22 RX ORDER — SODIUM CHLORIDE 0.9 % (FLUSH) 0.9 %
3 SYRINGE (ML) INJECTION AS NEEDED
Status: DISCONTINUED | OUTPATIENT
Start: 2020-02-22 | End: 2020-02-23

## 2020-02-22 RX ORDER — MORPHINE SULFATE 4 MG/ML
2 INJECTION, SOLUTION INTRAMUSCULAR; INTRAVENOUS EVERY 4 HOURS PRN
Status: DISCONTINUED | OUTPATIENT
Start: 2020-02-22 | End: 2020-02-23

## 2020-02-22 RX ORDER — MAGNESIUM SULFATE HEPTAHYDRATE 40 MG/ML
2 INJECTION, SOLUTION INTRAVENOUS ONCE
Status: COMPLETED | OUTPATIENT
Start: 2020-02-22 | End: 2020-02-22

## 2020-02-22 RX ORDER — HEPARIN SODIUM 5000 [USP'U]/ML
5000 INJECTION, SOLUTION INTRAVENOUS; SUBCUTANEOUS EVERY 12 HOURS SCHEDULED
Status: DISCONTINUED | OUTPATIENT
Start: 2020-02-22 | End: 2020-02-22

## 2020-02-22 RX ORDER — KETOROLAC TROMETHAMINE 15 MG/ML
15 INJECTION, SOLUTION INTRAMUSCULAR; INTRAVENOUS EVERY 6 HOURS PRN
Status: DISCONTINUED | OUTPATIENT
Start: 2020-02-22 | End: 2020-02-23

## 2020-02-22 RX ORDER — ONDANSETRON 2 MG/ML
4 INJECTION INTRAMUSCULAR; INTRAVENOUS ONCE
Status: COMPLETED | OUTPATIENT
Start: 2020-02-22 | End: 2020-02-22

## 2020-02-22 RX ORDER — ACETAMINOPHEN 650 MG/1
650 SUPPOSITORY RECTAL EVERY 6 HOURS PRN
Status: DISCONTINUED | OUTPATIENT
Start: 2020-02-22 | End: 2020-02-23

## 2020-02-22 RX ORDER — MORPHINE SULFATE 4 MG/ML
2 INJECTION, SOLUTION INTRAMUSCULAR; INTRAVENOUS ONCE
Status: COMPLETED | OUTPATIENT
Start: 2020-02-22 | End: 2020-02-22

## 2020-02-22 RX ORDER — SODIUM CHLORIDE AND POTASSIUM CHLORIDE .9; .15 G/100ML; G/100ML
SOLUTION INTRAVENOUS CONTINUOUS
Status: DISCONTINUED | OUTPATIENT
Start: 2020-02-22 | End: 2020-02-23

## 2020-02-22 NOTE — PROGRESS NOTES
ADMISSION NOTE    59year old male with h/o colon cancer,. NHL, thyroid cancer s/p ,multiple abdominal surgeries and RT, also s/p multiple admissions for SBO which have all resolved spontaneoulsy.   presents with less than 24 hour h/o RLQ crampy abdominal p

## 2020-02-22 NOTE — CONSULTS
[facility]    PATIENT'S NAME: Jin Iyer   ATTENDING PHYSICIAN: Lul Galicia MD   CONSULTING PHYSICIAN: Anastasia West MD   PATIENT ACCOUNT#:   [id number]    LOCATION:  [address] [place] [address] [place] #:   [id number]       DATE OF BIR tonsillectomy, vasectomy, bilateral cataract extraction. MEDICATIONS:  See chart. ALLERGIES:  Droperidol. SOCIAL HISTORY:  He is  and has 2 children. He is a clinical psychologist and instructor. No significant tobacco or alcohol use. A nasogastric tube has been placed this morning, and I have increased his IV fluids. He is on narcotics, antiemetics, and Protonix therapy. Continue close observation. Recommend surveillance colonoscopy as indicated.   MR enterography or small-bowel ser

## 2020-02-22 NOTE — PROGRESS NOTES
NGT ordered by MD. Additional dose of morphine and zofran given prior to procedure. Patient placed in upright position. 16F ngt inserted into left nare. Patient tolerated procedure well. CXR ordered to confirm placement. Continue to monitor.

## 2020-02-22 NOTE — CONSULTS
Pt known to me. Agreeable to NG now, for probable partial adhesive SBO. Increase IVF and watch kidney fx.

## 2020-02-22 NOTE — PLAN OF CARE
Problem: Patient Centered Care  Goal: Patient preferences are identified and integrated in the patient's plan of care  Description  Interventions:  - What would you like us to know as we care for you?  \"I live at home with my wife\"  - Provide timely, co or other facility with appropriate resources  Description  INTERVENTIONS:  - Identify barriers to discharge w/pt and caregiver  - Include patient/family/discharge partner in discharge planning  - Arrange for needed discharge resources and transportation as assess patient for signs and symptoms of electrolyte imbalances  - Administer electrolyte replacement as ordered  - Monitor response to electrolyte replacements, including rhythm and repeat lab results as appropriate  - Fluid restriction as ordered  - Inst

## 2020-02-22 NOTE — H&P
[facility]    PATIENT'S NAME: Faiza    ATTENDING PHYSICIAN: Rufus Leiva MD   PATIENT ACCOUNT#:   [de-identified]    LOCATION:  [address] [place] [address] #:   [id number]       YOB: 1955  ADMISSION DATE:       0 like his pancreatitis. He reported that it felt very much like his previous bowel obstructions.       PAST MEDICAL HISTORY:  Significant for recurrent small-bowel obstructions; non-Hodgkin lymphoma; colon cancer status post colon resection with ileostomy p except as listed in the History of Present Illness. PHYSICAL EXAMINATION:    VITAL SIGNS:  Temperature was 97.6, pulse 95, respiratory rate 18, blood pressure 138/84, O2 saturation 98% on room air. HEENT:  Normocephalic, atraumatic.   Pupils equal, re morning. 2.   Multiple previous malignancies including colon cancer, non-Hodgkin's lymphoma, and thyroid cancer, followed in the outpatient setting. 3.   Nonfasting hyperglycemia. Check hemoglobin A1c level. 4.   History of microscopic hematuria.   The

## 2020-02-22 NOTE — ED NOTES
Orders for admission, patient is aware of plan and ready to go upstairs.  Any questions, please call ED RN Christa Pace  at [address]

## 2020-02-22 NOTE — ED NOTES
Patient presents to ER with c/o abdominal pain/cramping that starting today. Patient with hs of small bowel obstruction and states this feels similar. Last BM this morning. +n/v. Denies fevers. Wife at bedside. Call light within reach.

## 2020-02-22 NOTE — ED PROVIDER NOTES
Patient Seen in: [facility] Emergency Department      History   Patient presents with:  Abdomen/Flank Pain    Stated Complaint: abd pain, hx sbo    HPI    Patient is a 70-year-old male who presents with right-sided and upper abdominal pain that st Exam    GENERAL: mild painful distress, awake and alert  HEENT: MMM, EOMI, PERRL  Neck: supple, non tender  CV: RRR, no murmurs  Resp: CTAB, no wheezes or retractions  Ab: soft, TTP in right flank with guarding. No rebound.  BS normal. Mild distension  Extr small bowel measuring up to 3.7 cm with air-fluid levels consistent with partial small bowel obstruction related to adhesions however no definite transition point. Postsurgical changes from right hemicolectomy again noted.   Gallstones with no gallbladder

## 2020-02-23 VITALS
OXYGEN SATURATION: 99 % | SYSTOLIC BLOOD PRESSURE: 153 MMHG | TEMPERATURE: 98 F | HEART RATE: 118 BPM | RESPIRATION RATE: 18 BRPM | BODY MASS INDEX: 22.67 KG/M2 | HEIGHT: 70 IN | DIASTOLIC BLOOD PRESSURE: 98 MMHG | WEIGHT: 158.38 LBS

## 2020-02-23 LAB
ANION GAP SERPL CALC-SCNC: 6 MMOL/L (ref 0–18)
BASOPHILS # BLD AUTO: 0.02 X10(3) UL (ref 0–0.2)
BASOPHILS NFR BLD AUTO: 0.2 %
BUN BLD-MCNC: 28 MG/DL (ref 7–18)
BUN/CREAT SERPL: 15 (ref 10–20)
CALCIUM BLD-MCNC: 8.7 MG/DL (ref 8.5–10.1)
CHLORIDE SERPL-SCNC: 116 MMOL/L (ref 98–112)
CO2 SERPL-SCNC: 21 MMOL/L (ref 21–32)
CREAT BLD-MCNC: 1.87 MG/DL (ref 0.7–1.3)
DEPRECATED RDW RBC AUTO: 50.7 FL (ref 35.1–46.3)
EOSINOPHIL # BLD AUTO: 0.12 X10(3) UL (ref 0–0.7)
EOSINOPHIL NFR BLD AUTO: 1.2 %
ERYTHROCYTE [DISTWIDTH] IN BLOOD BY AUTOMATED COUNT: 15.1 % (ref 11–15)
GLUCOSE BLD-MCNC: 117 MG/DL (ref 70–99)
HAV IGM SER QL: 1.7 MG/DL (ref 1.6–2.6)
HCT VFR BLD AUTO: 37.5 % (ref 39–53)
HGB BLD-MCNC: 12 G/DL (ref 13–17.5)
IMM GRANULOCYTES # BLD AUTO: 0.04 X10(3) UL (ref 0–1)
IMM GRANULOCYTES NFR BLD: 0.4 %
LYMPHOCYTES # BLD AUTO: 0.85 X10(3) UL (ref 1–4)
LYMPHOCYTES NFR BLD AUTO: 8.5 %
MCH RBC QN AUTO: 29.6 PG (ref 26–34)
MCHC RBC AUTO-ENTMCNC: 32 G/DL (ref 31–37)
MCV RBC AUTO: 92.4 FL (ref 80–100)
MONOCYTES # BLD AUTO: 0.66 X10(3) UL (ref 0.1–1)
MONOCYTES NFR BLD AUTO: 6.6 %
NEUTROPHILS # BLD AUTO: 8.32 X10 (3) UL (ref 1.5–7.7)
NEUTROPHILS # BLD AUTO: 8.32 X10(3) UL (ref 1.5–7.7)
NEUTROPHILS NFR BLD AUTO: 83.1 %
OSMOLALITY SERPL CALC.SUM OF ELEC: 303 MOSM/KG (ref 275–295)
PHOSPHATE SERPL-MCNC: 2.6 MG/DL (ref 2.5–4.9)
PLATELET # BLD AUTO: 116 10(3)UL (ref 150–450)
POTASSIUM SERPL-SCNC: 5 MMOL/L (ref 3.5–5.1)
RBC # BLD AUTO: 4.06 X10(6)UL (ref 4.3–5.7)
SODIUM SERPL-SCNC: 143 MMOL/L (ref 136–145)
WBC # BLD AUTO: 10 X10(3) UL (ref 4–11)

## 2020-02-23 PROCEDURE — 99233 SBSQ HOSP IP/OBS HIGH 50: CPT | Performed by: HOSPITALIST

## 2020-02-23 PROCEDURE — 99231 SBSQ HOSP IP/OBS SF/LOW 25: CPT | Performed by: SURGERY

## 2020-02-23 RX ORDER — ACETAMINOPHEN 325 MG/1
650 TABLET ORAL EVERY 6 HOURS PRN
Qty: 1 TABLET | Refills: 0 | Status: SHIPPED | COMMUNITY
Start: 2020-02-23

## 2020-02-23 RX ORDER — MAGNESIUM SULFATE HEPTAHYDRATE 40 MG/ML
2 INJECTION, SOLUTION INTRAVENOUS ONCE
Status: COMPLETED | OUTPATIENT
Start: 2020-02-23 | End: 2020-02-23

## 2020-02-23 RX ORDER — HYDROCODONE BITARTRATE AND ACETAMINOPHEN 5; 325 MG/1; MG/1
1 TABLET ORAL EVERY 6 HOURS PRN
Qty: 5 TABLET | Refills: 0 | Status: SHIPPED | OUTPATIENT
Start: 2020-02-23

## 2020-02-23 RX ORDER — ACETAMINOPHEN 325 MG/1
650 TABLET ORAL EVERY 6 HOURS PRN
Status: DISCONTINUED | OUTPATIENT
Start: 2020-02-23 | End: 2020-02-23

## 2020-02-23 NOTE — PROGRESS NOTES
[facility]    Progress Note    Asher Johnson Patient Status:  Inpatient    3/16/1955 MRN [id number]   Location [facility] 5SW/SE Attending Becca Galicia Day # 1 PCP PHYSICIAN NONSTAFF     Assessment and Plan: 11/26/2019       Ct Abdomen+pelvis(contrast Only)(cpt=74177)    Result Date: 2/22/2020  CONCLUSION:  1. Redemonstration of dilated, fluid-filled loops of small bowel, but without distinct identification of a focal transition point.  Differential diagnostic

## 2020-02-23 NOTE — PROGRESS NOTES
Pt seen post [address]    Progress Note    German Bradely Patient Status:  Inpatient    3/16/1955 MRN [id number]   Location [facility] 5SW/SE Attending Jackelin Galicia Ferdinand Day # 1 PCP PHYSICIAN DEYATACINDY        S cystoscopy in March. 5.       Hypertension. We will monitor blood pressure and adjust with IV medications if necessary. 6.       Hypothyroidism.   We will give IV thyroid medications while n.p.o.  7.       History of pancreatitis in the past.  Currently, Joey Mahoney MD on 2/22/2020 at 7:19     Approved by (CST): Joey Mahoney MD on 2/22/2020 at 7:30          Xr Chest Ap Portable  (cpt=71045)    Result Date: 2/22/2020  CONCLUSION:  1. No acute airspace disease.   NG tube terminates in the fundus of the

## 2020-02-24 NOTE — PROGRESS NOTES
Paged Dr. Diana Yang to verify that ok for pt to be dc home. Patient tolerated soft diet, and is requesting to be dc. No c/o pain or nausea at this time.  Stated ok for pt to go home and follow up with PCP and regular gi MD. Paged Dr. Marlynn Severs to give informat

## 2020-02-24 NOTE — DISCHARGE SUMMARY
[facility]    PATIENT'S NAME: Faiza    ATTENDING PHYSICIAN: Lul Galicia MD   PATIENT ACCOUNT#:   [id number]    LOCATION:  [address] [place] [address] [place] RECORD #:   [id number]       YOB: 1955  ADMISSION DATE:       02/21 sounds. EXTREMITIES:  Without edema. NEUROLOGIC:  He is alert, oriented, friendly, and cooperative. LABORATORY STUDIES:  Please see chart. ASSESSMENT AND PLAN:    1. Recurrent small bowel obstruction from many previous abdominal procedures.   The MD Grayson  d: 02/24/2020 08:28:17  t: 02/24/2020 10:10:28  [facility] [id number]  LAS/

## 2020-02-24 NOTE — PAYOR COMM NOTE
--------------  Appropriate for inpatient status per guidelines for partial SBO requiring NG.        ADMISSION REVIEW     Payor: [facility] #:  [id number]  Authorization Number: N/A       ED Provider Notes             P kg/m²          Physical Exam    GENERAL: mild painful distress, awake and alert  HEENT: MMM, EOMI, PERRL  Neck: supple, non tender  CV: RRR, no murmurs  Resp: CTAB, no wheezes or retractions  Ab: soft, TTP in right flank with guarding. No rebound.  BS genoveva bowel obstruction related to adhesions however no definite transition point. Postsurgical changes from right hemicolectomy again noted. Gallstones with no gallbladder wall thickening.       Disposition and Plan     Clinical Impression:  Acute kidney injur right hemicolectomy, for colon cancer. He has also had a history of pancreatitis, but this pain did not feel like his pancreatitis. He reported that it felt very much like his previous bowel obstructions.       PAST MEDICAL HISTORY:  Significant for recur sounds were hypoactive. There were rushes and there was tenderness to palpation in the epigastric, periumbilical, and right upper and middle quadrants. Some distention was present and there was some voluntary guarding as well.   EXTREMITIES:  No clubbing, DVT prophylaxis:  SCDs, subcutaneous heparin. 9.   GI prophylaxis:  Protonix. 10.   The patient's current clinical status and proposed treatment plan was discussed with him.   All his questions were answered, and he agreed with the plan of care as outlin of pancreatitis in the past.  Currently, lipase is normal.  8.       DVT prophylaxis:  SCDs, subcutaneous heparin.   9.       GI prophylaxis:  Protonix.             02/23/20 0833 98.3 °F (36.8 °C) 118 18 153/98Abnormal  99 % — None (Room air)        02/22/2

## 2020-02-24 NOTE — DISCHARGE SUMMARY
Dc summary#[id number]  > 30 min spent on [address] Discharge Diagnoses: sbo    Lace+ Score: 56  59-90 High Risk  29-58 Medium Risk  0-28   Low Risk. TCM Follow-Up Recommendation:  LACE 29-58:  Moderate Risk of readmission after discharge from the hosp

## 2021-02-19 ENCOUNTER — TRANSCRIBE ORDERS (OUTPATIENT)
Dept: ADMINISTRATIVE | Facility: HOSPITAL | Age: 66
End: 2021-02-19

## 2021-02-19 ENCOUNTER — HOSPITAL ENCOUNTER (OUTPATIENT)
Dept: GENERAL RADIOLOGY | Facility: HOSPITAL | Age: 66
Discharge: HOME | End: 2021-02-19

## 2021-02-19 DIAGNOSIS — Z02.71 DISABILITY EXAMINATION: ICD-10-CM

## 2021-02-19 DIAGNOSIS — Z02.71 DISABILITY EXAMINATION: Primary | ICD-10-CM

## 2021-02-19 PROCEDURE — 73030 X-RAY EXAM OF SHOULDER: CPT

## 2021-02-19 PROCEDURE — 72040 X-RAY EXAM NECK SPINE 2-3 VW: CPT

## 2022-01-12 ENCOUNTER — HOSPITAL ENCOUNTER (EMERGENCY)
Facility: HOSPITAL | Age: 67
Discharge: HOME | End: 2022-01-13
Attending: EMERGENCY MEDICINE
Payer: MEDICARE

## 2022-01-12 DIAGNOSIS — K56.600 PARTIAL SMALL BOWEL OBSTRUCTION (HCC): Primary | ICD-10-CM

## 2022-01-12 LAB
BASOPHILS # BLD AUTO: 0.05 X10(3) UL (ref 0–0.2)
BASOPHILS NFR BLD AUTO: 0.3 %
DEPRECATED RDW RBC AUTO: 47.9 FL (ref 35.1–46.3)
EOSINOPHIL # BLD AUTO: 0.07 X10(3) UL (ref 0–0.7)
EOSINOPHIL NFR BLD AUTO: 0.5 %
ERYTHROCYTE [DISTWIDTH] IN BLOOD BY AUTOMATED COUNT: 14.6 % (ref 11–15)
HCT VFR BLD AUTO: 42.2 %
HGB BLD-MCNC: 13.6 G/DL
IMM GRANULOCYTES # BLD AUTO: 0.1 X10(3) UL (ref 0–1)
IMM GRANULOCYTES NFR BLD: 0.7 %
LYMPHOCYTES # BLD AUTO: 0.6 X10(3) UL (ref 1–4)
LYMPHOCYTES NFR BLD AUTO: 3.9 %
MCH RBC QN AUTO: 29.1 PG (ref 26–34)
MCHC RBC AUTO-ENTMCNC: 32.2 G/DL (ref 31–37)
MCV RBC AUTO: 90.2 FL
MONOCYTES # BLD AUTO: 0.7 X10(3) UL (ref 0.1–1)
MONOCYTES NFR BLD AUTO: 4.6 %
NEUTROPHILS # BLD AUTO: 13.82 X10 (3) UL (ref 1.5–7.7)
NEUTROPHILS # BLD AUTO: 13.82 X10(3) UL (ref 1.5–7.7)
NEUTROPHILS NFR BLD AUTO: 90 %
PLATELET # BLD AUTO: 145 10(3)UL (ref 150–450)
RBC # BLD AUTO: 4.68 X10(6)UL
WBC # BLD AUTO: 15.3 X10(3) UL (ref 4–11)

## 2022-01-12 PROCEDURE — 99284 EMERGENCY DEPT VISIT MOD MDM: CPT

## 2022-01-12 PROCEDURE — 96375 TX/PRO/DX INJ NEW DRUG ADDON: CPT

## 2022-01-12 PROCEDURE — 83690 ASSAY OF LIPASE: CPT | Performed by: EMERGENCY MEDICINE

## 2022-01-12 PROCEDURE — 96374 THER/PROPH/DIAG INJ IV PUSH: CPT

## 2022-01-12 PROCEDURE — 80048 BASIC METABOLIC PNL TOTAL CA: CPT | Performed by: EMERGENCY MEDICINE

## 2022-01-12 PROCEDURE — 96361 HYDRATE IV INFUSION ADD-ON: CPT

## 2022-01-12 PROCEDURE — 80076 HEPATIC FUNCTION PANEL: CPT | Performed by: EMERGENCY MEDICINE

## 2022-01-12 PROCEDURE — 85025 COMPLETE CBC W/AUTO DIFF WBC: CPT | Performed by: EMERGENCY MEDICINE

## 2022-01-12 RX ORDER — ONDANSETRON 2 MG/ML
4 INJECTION INTRAMUSCULAR; INTRAVENOUS ONCE
Status: COMPLETED | OUTPATIENT
Start: 2022-01-12 | End: 2022-01-12

## 2022-01-12 RX ORDER — MORPHINE SULFATE 4 MG/ML
4 INJECTION, SOLUTION INTRAMUSCULAR; INTRAVENOUS ONCE
Status: COMPLETED | OUTPATIENT
Start: 2022-01-12 | End: 2022-01-12

## 2022-01-13 ENCOUNTER — APPOINTMENT (OUTPATIENT)
Dept: CT IMAGING | Facility: HOSPITAL | Age: 67
End: 2022-01-13
Attending: EMERGENCY MEDICINE
Payer: MEDICARE

## 2022-01-13 VITALS
SYSTOLIC BLOOD PRESSURE: 147 MMHG | BODY MASS INDEX: 23.34 KG/M2 | OXYGEN SATURATION: 97 % | TEMPERATURE: 99 F | WEIGHT: 163 LBS | HEART RATE: 101 BPM | HEIGHT: 70 IN | DIASTOLIC BLOOD PRESSURE: 93 MMHG | RESPIRATION RATE: 20 BRPM

## 2022-01-13 LAB
ALBUMIN SERPL-MCNC: 4.2 G/DL (ref 3.4–5)
ALP LIVER SERPL-CCNC: 132 U/L
ALT SERPL-CCNC: 46 U/L
ANION GAP SERPL CALC-SCNC: 8 MMOL/L (ref 0–18)
AST SERPL-CCNC: 37 U/L (ref 15–37)
BILIRUB DIRECT SERPL-MCNC: 0.3 MG/DL (ref 0–0.2)
BILIRUB SERPL-MCNC: 1.1 MG/DL (ref 0.1–2)
BUN BLD-MCNC: 26 MG/DL (ref 7–18)
BUN/CREAT SERPL: 12.3 (ref 10–20)
CALCIUM BLD-MCNC: 9.2 MG/DL (ref 8.5–10.1)
CHLORIDE SERPL-SCNC: 105 MMOL/L (ref 98–112)
CO2 SERPL-SCNC: 28 MMOL/L (ref 21–32)
CREAT BLD-MCNC: 2.12 MG/DL
GLUCOSE BLD-MCNC: 203 MG/DL (ref 70–99)
LIPASE SERPL-CCNC: 113 U/L (ref 73–393)
OSMOLALITY SERPL CALC.SUM OF ELEC: 303 MOSM/KG (ref 275–295)
POTASSIUM SERPL-SCNC: 4.1 MMOL/L (ref 3.5–5.1)
PROT SERPL-MCNC: 7.7 G/DL (ref 6.4–8.2)
SODIUM SERPL-SCNC: 141 MMOL/L (ref 136–145)

## 2022-01-13 PROCEDURE — 74176 CT ABD & PELVIS W/O CONTRAST: CPT | Performed by: EMERGENCY MEDICINE

## 2022-01-13 RX ORDER — ONDANSETRON 4 MG/1
4 TABLET, ORALLY DISINTEGRATING ORAL EVERY 4 HOURS PRN
Qty: 10 TABLET | Refills: 0 | Status: SHIPPED | OUTPATIENT
Start: 2022-01-13 | End: 2022-01-20

## 2022-01-13 NOTE — ED INITIAL ASSESSMENT (HPI)
\"crampy abdominal pain for 5-7 hours\" history of bowel obstructions. States the pain feels similar. Vomited on the way here. Levy Riding

## 2022-01-13 NOTE — ED PROVIDER NOTES
Patient Seen in: [facility] AND [facility] Emergency Department      History   Patient presents with:  Abdomen/Flank Pain    Stated Complaint: abdominal pain    Subjective:   HPI    77-year-old male with past medical history significant for multiple abdominal [place] Temporal   SpO2 01/12/22 2217 96 %   O2 Device 01/12/22 2330 None (Room air)       Current:BP (!) 149/91   Pulse 92   Temp 97.5 °F (36.4 °C) (Temporal)   Resp 15   Ht 177.8 cm (5' 10\")   Wt 73.9 kg   SpO2 96%   BMI 23.39 kg/m²         Physical Exam    Phy PLATELET    Narrative: The following orders were created for panel order CBC With Differential With Platelet.   Procedure                               Abnormality         Status                     ---------                               ----------- feels like the discomfort is minimal at this time. CT scan shows some concern for partial small bowel obstruction but I believe perhaps this has resolved at this time as patient is feeling better.   He is comfortable with discharge and will return if he ha

## 2022-02-09 ENCOUNTER — HOSPITAL ENCOUNTER (OUTPATIENT)
Age: 67
Discharge: HOME | End: 2022-02-09
Payer: MEDICARE

## 2022-02-09 VITALS
BODY MASS INDEX: 23.7 KG/M2 | WEIGHT: 160 LBS | SYSTOLIC BLOOD PRESSURE: 143 MMHG | OXYGEN SATURATION: 100 % | HEART RATE: 99 BPM | TEMPERATURE: 98 F | HEIGHT: 69 IN | DIASTOLIC BLOOD PRESSURE: 79 MMHG | RESPIRATION RATE: 18 BRPM

## 2022-02-09 DIAGNOSIS — Z20.822 ENCOUNTER FOR LABORATORY TESTING FOR COVID-19 VIRUS: ICD-10-CM

## 2022-02-09 DIAGNOSIS — R05.9 COUGH: ICD-10-CM

## 2022-02-09 DIAGNOSIS — J02.0 STREPTOCOCCAL SORE THROAT: Primary | ICD-10-CM

## 2022-02-09 DIAGNOSIS — J01.40 ACUTE NON-RECURRENT PANSINUSITIS: ICD-10-CM

## 2022-02-09 PROBLEM — I05.9 MITRAL VALVE DISORDER: Status: ACTIVE | Noted: 2018-06-14

## 2022-02-09 PROBLEM — R13.10 DYSPHAGIA: Status: ACTIVE | Noted: 2021-06-29

## 2022-02-09 PROBLEM — R97.0 ELEVATED CEA: Status: ACTIVE | Noted: 2021-06-29

## 2022-02-09 PROBLEM — K21.9 GERD (GASTROESOPHAGEAL REFLUX DISEASE): Status: ACTIVE | Noted: 2020-05-28

## 2022-02-09 PROBLEM — I07.1 MILD TRICUSPID REGURGITATION: Status: ACTIVE | Noted: 2020-05-14

## 2022-02-09 LAB
S PYO AG THROAT QL: POSITIVE
SARS-COV-2 RNA RESP QL NAA+PROBE: NOT DETECTED

## 2022-02-09 PROCEDURE — U0002 COVID-19 LAB TEST NON-CDC: HCPCS | Performed by: EMERGENCY MEDICINE

## 2022-02-09 PROCEDURE — 99213 OFFICE O/P EST LOW 20 MIN: CPT | Performed by: EMERGENCY MEDICINE

## 2022-02-09 PROCEDURE — 87880 STREP A ASSAY W/OPTIC: CPT | Performed by: EMERGENCY MEDICINE

## 2022-02-09 RX ORDER — AMOXICILLIN AND CLAVULANATE POTASSIUM 875; 125 MG/1; MG/1
1 TABLET, FILM COATED ORAL 2 TIMES DAILY
Qty: 20 TABLET | Refills: 0 | Status: SHIPPED | OUTPATIENT
Start: 2022-02-09 | End: 2022-02-19

## 2022-02-09 RX ORDER — PREDNISONE 20 MG/1
TABLET ORAL
Qty: 10 TABLET | Refills: 0 | Status: SHIPPED | OUTPATIENT
Start: 2022-02-09 | End: 2022-02-16

## 2022-02-09 RX ORDER — BENZONATATE 100 MG/1
100 CAPSULE ORAL 3 TIMES DAILY PRN
Qty: 20 CAPSULE | Refills: 0 | Status: SHIPPED | OUTPATIENT
Start: 2022-02-09

## 2022-02-09 NOTE — ED INITIAL ASSESSMENT (HPI)
Pt states about 10 days ago began having sinus congestion and loss of voice. Pt states mucus is colorful and has been coughing occasionally. Pt denies fevers, or NVD.

## 2022-02-15 ENCOUNTER — OFFICE (AMBULATORY)
Dept: URBAN - METROPOLITAN AREA PATHOLOGY 4 | Facility: PATHOLOGY | Age: 67
End: 2022-02-15
Payer: COMMERCIAL

## 2022-02-15 ENCOUNTER — ON CAMPUS - OUTPATIENT (AMBULATORY)
Dept: URBAN - METROPOLITAN AREA HOSPITAL 2 | Facility: HOSPITAL | Age: 67
End: 2022-02-15
Payer: COMMERCIAL

## 2022-02-15 ENCOUNTER — OFFICE (AMBULATORY)
Dept: URBAN - METROPOLITAN AREA PATHOLOGY 4 | Facility: PATHOLOGY | Age: 67
End: 2022-02-15

## 2022-02-15 VITALS
SYSTOLIC BLOOD PRESSURE: 144 MMHG | SYSTOLIC BLOOD PRESSURE: 113 MMHG | DIASTOLIC BLOOD PRESSURE: 69 MMHG | OXYGEN SATURATION: 99 % | HEART RATE: 66 BPM | DIASTOLIC BLOOD PRESSURE: 71 MMHG | SYSTOLIC BLOOD PRESSURE: 110 MMHG | HEIGHT: 71 IN | OXYGEN SATURATION: 100 % | DIASTOLIC BLOOD PRESSURE: 88 MMHG | DIASTOLIC BLOOD PRESSURE: 70 MMHG | DIASTOLIC BLOOD PRESSURE: 68 MMHG | SYSTOLIC BLOOD PRESSURE: 114 MMHG | DIASTOLIC BLOOD PRESSURE: 94 MMHG | HEART RATE: 77 BPM | SYSTOLIC BLOOD PRESSURE: 100 MMHG | RESPIRATION RATE: 18 BRPM | HEART RATE: 81 BPM | HEART RATE: 83 BPM | HEART RATE: 68 BPM | HEART RATE: 87 BPM | SYSTOLIC BLOOD PRESSURE: 130 MMHG | DIASTOLIC BLOOD PRESSURE: 72 MMHG | OXYGEN SATURATION: 98 % | RESPIRATION RATE: 16 BRPM | HEART RATE: 75 BPM | WEIGHT: 170 LBS | HEART RATE: 72 BPM | SYSTOLIC BLOOD PRESSURE: 106 MMHG | RESPIRATION RATE: 15 BRPM | TEMPERATURE: 97.1 F | RESPIRATION RATE: 14 BRPM

## 2022-02-15 DIAGNOSIS — K57.30 DIVERTICULOSIS OF LARGE INTESTINE WITHOUT PERFORATION OR ABS: ICD-10-CM

## 2022-02-15 DIAGNOSIS — K62.1 RECTAL POLYP: ICD-10-CM

## 2022-02-15 DIAGNOSIS — Z12.11 ENCOUNTER FOR SCREENING FOR MALIGNANT NEOPLASM OF COLON: ICD-10-CM

## 2022-02-15 LAB
GI HISTOLOGY: A. UNSPECIFIED: (no result)
GI HISTOLOGY: PDF REPORT: (no result)

## 2022-02-15 PROCEDURE — 45380 COLONOSCOPY AND BIOPSY: CPT | Mod: PT | Performed by: INTERNAL MEDICINE

## 2022-02-15 PROCEDURE — 88305 TISSUE EXAM BY PATHOLOGIST: CPT | Mod: 26 | Performed by: INTERNAL MEDICINE

## 2022-05-16 ENCOUNTER — APPOINTMENT (OUTPATIENT)
Dept: LAB | Facility: HOSPITAL | Age: 67
End: 2022-05-16

## 2022-05-31 ENCOUNTER — RESEARCH ENCOUNTER (OUTPATIENT)
Dept: ONCOLOGY | Facility: CLINIC | Age: 67
End: 2022-05-31

## 2022-06-01 ENCOUNTER — LAB (OUTPATIENT)
Dept: LAB | Facility: HOSPITAL | Age: 67
End: 2022-06-01

## 2022-06-01 ENCOUNTER — CONSULT (OUTPATIENT)
Dept: RADIATION ONCOLOGY | Facility: HOSPITAL | Age: 67
End: 2022-06-01

## 2022-06-01 VITALS
HEART RATE: 69 BPM | OXYGEN SATURATION: 98 % | BODY MASS INDEX: 24.69 KG/M2 | RESPIRATION RATE: 20 BRPM | WEIGHT: 177 LBS | DIASTOLIC BLOOD PRESSURE: 89 MMHG | SYSTOLIC BLOOD PRESSURE: 152 MMHG

## 2022-06-01 DIAGNOSIS — C61 ADENOCARCINOMA OF PROSTATE: ICD-10-CM

## 2022-06-01 DIAGNOSIS — C61 ADENOCARCINOMA OF PROSTATE: Primary | ICD-10-CM

## 2022-06-01 PROBLEM — E10.3293: Status: ACTIVE | Noted: 2021-06-02

## 2022-06-01 PROBLEM — H40.51X0 NEOVASCULAR GLAUCOMA OF RIGHT EYE: Status: ACTIVE | Noted: 2021-06-02

## 2022-06-01 PROBLEM — F41.9 ANXIETY: Status: ACTIVE | Noted: 2021-06-02

## 2022-06-01 PROBLEM — H04.221 EPIPHORA DUE TO INSUFFICIENT DRAINAGE, RIGHT SIDE: Status: ACTIVE | Noted: 2021-06-02

## 2022-06-01 PROBLEM — E78.5 HYPERLIPIDEMIA: Status: ACTIVE | Noted: 2022-06-01

## 2022-06-01 PROBLEM — H17.11 CENTRAL CORNEAL OPACITY OF RIGHT EYE: Status: ACTIVE | Noted: 2021-06-02

## 2022-06-01 PROBLEM — H02.203 LAGOPHTHALMOS OF RIGHT EYE: Status: ACTIVE | Noted: 2021-06-02

## 2022-06-01 PROBLEM — Z96.1 PSEUDOPHAKIA OF RIGHT EYE: Status: ACTIVE | Noted: 2021-06-29

## 2022-06-01 PROBLEM — F32.A DEPRESSIVE DISORDER: Status: ACTIVE | Noted: 2021-06-02

## 2022-06-01 PROBLEM — F90.9 ATTENTION DEFICIT HYPERACTIVITY DISORDER: Status: ACTIVE | Noted: 2021-06-02

## 2022-06-01 PROCEDURE — G0463 HOSPITAL OUTPT CLINIC VISIT: HCPCS

## 2022-06-01 PROCEDURE — 84153 ASSAY OF PSA TOTAL: CPT

## 2022-06-01 PROCEDURE — 99205 OFFICE O/P NEW HI 60 MIN: CPT | Performed by: RADIOLOGY

## 2022-06-01 PROCEDURE — G0463 HOSPITAL OUTPT CLINIC VISIT: HCPCS | Performed by: RADIOLOGY

## 2022-06-01 RX ORDER — LEVOTHYROXINE SODIUM 137 UG/1
1 TABLET ORAL DAILY
COMMUNITY
Start: 2022-01-03

## 2022-06-01 RX ORDER — DORZOLAMIDE HYDROCHLORIDE AND TIMOLOL MALEATE 20; 5 MG/ML; MG/ML
SOLUTION/ DROPS OPHTHALMIC
COMMUNITY
Start: 2022-04-22 | End: 2022-06-01

## 2022-06-01 RX ORDER — LATANOPROST 50 UG/ML
SOLUTION/ DROPS OPHTHALMIC
COMMUNITY

## 2022-06-01 NOTE — PROGRESS NOTES
RADIATION THERAPY CONSULT NOTE    NAME: Nash Cannon  YOB: 1955  MRN #: [id number]  DATE OF SERVICE: 6/1/2022  REFERRING PROVIDER: Beny Gautam MD  [address] [place] [address]  [place] [id number]  PRIMARY CARE PROVIDER: Kevin Pinon MD    DIAGNOSIS:  NCCN Unfavorable Intermediate Risk Prostate Cancer, Grade Group 2, max PSA 12.50 ng/mL--     1. Teodora 7(3+4) in 5/16 cores: Left base lateral, right mid, right apex, right base lateral, right mid lateral      2. Teodora 6(3+3) in 3/16 cores: Left apex lateral, right base, left anterior mid  Encounter Diagnosis   Name Primary?   • Adenocarcinoma of prostate (HCC) Yes       REASON FOR CONSULTATION/CHIEF COMPLAINT:  Prostate Cancer  I was asked to see the patient at the request of the referring provider noted below for advice and recommendations regarding this diagnosis and the role of radiation therapy.                              REQUESTING PHYSICIAN:  Beny Gautam Md  [address] [place] [address],  [place] [id number]    RECORDS OBTAINED:  Records of the patients history including those obtained from the referring provider were reviewed and summarized in detail.    HISTORY OF PRESENT ILLNESS:  Nash Cannon is a 67 y.o. male     PSA TIMELINE:  03/2014         6.61 ng/mL  07/2014         6.60 ng/mL  01/2017         9.50 ng/mL  12/2017         8.10 ng/mL  05/02/2018    7.15 ng/mL  01/29/2019    9.09 ng/mL                        0.98 ng/mL Free PSA (10.8%)  05/07/2019    7.32 ng/mL                        0.71 ng/mL Free PSA (9.66%)  11/20/2019  10.25 ng/mL  02/2021       10.70 ng/mL  05/06/2021  12.50 ng/mL    MRI PROSTATE W/ AND W/O CONTRAST  DATE: 05/22/2013  FACILITY: [facility]  FINDINGS:  In the anterior right peripheral zone in the apex of the prostate, from about the 9 o'clock to 11 o'clock position, there is a 15 x 7 mm area of slightly diminished signal on the T2 weighted images, we have arguably some slightly restricted    diffusion and a slightly asymmetric enhancement, though admittedly this is very subtle and equivocal at best. A very low grade neoplasm or remnant of prostatitis could produce such an appearance, though this does not have the appearance of a high-grade   prostate malignancy. There is no abnormal signal elsewhere in the gland. Seminal vesicles appear normal. No adenopathy or skeletal metastasis is seen.    MRI PROSTATE W/ AND W/O CONTRAST  DATE: 02/15/2017  FACILITY: [facility]  FINDINGS:  Motion artifact slightly degrades image quality. Prostate gland is enlarged and measures 5.2 x 3.7 x 3.5 cm for a total prostate volume of 37.07 mL. Mild heterogeneous signal intensity and enhancement in the central and is related to mild BPH.   There is hazy and streaky decreased T2 signal intensity in the bilateral peripheral zones without significant corresponding abnormality on the ADC trace map to suggest chronic prostatitis. There is symmetric mild hyperenhancement within the bilateral   peripheral zones and acute prostatitis is not excluded although there is no periprosthetic inflammatory change or evidence of abscess. No suspicious T2 localizing lesion to suggest significant prostate malignancy.   There is thickening of the wall of the urinary bladder which is not well-distended. Seminal vesicles are symmetric. Wall thickening of the rectum may be exaggerated by under distention. Susceptibility artifact in the anterior pelvis is related to   postoperative change. No ascites or significant lymphadenopathy. There are degenerative changes with no suspicious osseous lesion.   IMPRESSION:   1. No suspicious T2 localizing lesion to suggest significant prostate malignancy.   2. Chronic prostatitis. Possible superimposed acute prostatitis although no obvious inflammatory change or evidence of abscess.   3. Mildly enlarged prostate gland.   4. Wall thickening of the rectum may be exaggerated by under distention.  Correlation with physical exam is recommended.     MRI PROSTATE W/ AND W/O CONTRAST  DATE: 05/29/2018  FACILITY: [facility]  FINDINGS:   Prostate size: 5.3 x 3.4 x 3.2 cm, 32 g   Peripheral zone: Streaky diminished T2 signal, most focal in the right anterior apex, though slightly improved from the prior and without restricted diffusion. This is almost certainly inflammatory in origin. PI-RADS category 2   Transition zone: Minimally nodular, especially in the anterior apex, similar to the prior. No lesion suspicious for significant cancer. PI-RADS category 2   Seminal vesicles: Relatively atrophic but otherwise negative   Skeleton and lymph nodes: No sign of metastasis   Because there is no focal lesion suspicious for malignancy, no lesion is annotated on the images for potential biopsy.   IMPRESSION:   1. Peripheral zone changes which appear very likely inflammatory in origin, slightly improved from the prior   2. Mild benign prostate hyperplasia          Transperineal prostate biopsy was completed on 04/19/2022 by Dr. Gautam: Prostate is 55 x 27 x 56. Prostate volume was measured at 45 cc Predicted PSA was not calculated. The patients serum PSA was not calculated. The seminal vesicles were normal in size, shape, and configuration. The capsular margin was intact. A significant middle lobe was not seen. Residual urine was noted to be minimal. Ultrasound evaluation of the prostate showed no abnormalities. There was no Prostatic Calcifications to be noted. Biopsies were then taken per 36 core protocol. A total of 18 biopsies were taken on the right and 18 biopsies were taken on the left, and submitted for pathological examination.    PROSTATE BIOPSY PATHOLOGY  COLLECTED: 04/19/2022  REPORTED: 04/23/2022  ACCESSION#: -[id number]  LOCATION: [facility]  FINAL DIAGNOSIS:     Adenocarcinoma of the prostate        Teodora 7(3+4) in 5/16 cores: Left base lateral (4=10%, 3% total), right mid (4=10%, 60% total), right  apex (4=5%, 30% total), right base lateral (4=15%, 10% total), right mid lateral (4=10%, 80% total),         Teodora 6(3+3) in 3/16 cores: Left apex lateral (35%) , right base (25%), left anterior mid (1%)      + ED  IIEF/MUKUND score 10/25  AUA 0/35    He has been ordered bone scan and CT abd/pelvis.    The following portions of the patient's history were reviewed and updated as appropriate: allergies, current medications, past family history, past medical history, past social history, past surgical history and problem list. Reviewed with the patient and remain unchanged.    PAST MEDICAL HISTORY:  he has a past medical history of Cancer (CMS/HCC), Carpal tunnel syndrome on right, Diabetes mellitus (CMS/HCC), Disease of thyroid gland, Double vision, Eye disorder, Neck pain on right side, Numbness and tingling in right hand, PONV (postoperative nausea and vomiting), Sciatica of right side, and Seasonal allergies.  MOHS surgery 10 years ago, adjuvant XRT at [facility], had cataract, complications and glaucoma      MEDICATIONS:    Current Outpatient Medications:   •  dorzolamide (TRUSOPT) 2 % ophthalmic solution, Administer 1 drop to the right eye 2 (Two) Times a Day., Disp: , Rfl:   •  glucose blood test strip, Accu-Chek Andra Plus Meter, Disp: , Rfl:   •  HYDROcodone-acetaminophen (NORCO) 5-325 MG per tablet, Take 1 tablet by mouth Every 4 (Four) Hours As Needed for Moderate Pain  (pain) for up to 15 doses., Disp: 15 tablet, Rfl: 0  •  Insulin Glargine (BASAGLAR KWIKPEN) 100 UNIT/ML injection pen, Inject 18 Units under the skin into the appropriate area as directed Every Morning., Disp: , Rfl:   •  insulin lispro (humaLOG) 100 UNIT/ML injection, Inject  under the skin into the appropriate area as directed 3 (Three) Times a Day Before Meals. SLIDING SCALE, Disp: , Rfl:   •  levothyroxine (SYNTHROID, LEVOTHROID) 137 MCG tablet, Take 137 mcg by mouth Every Morning., Disp: , Rfl:     ALLERGIES:  No Known Allergies    PAST  SURGICAL HISTORY:  he has a past surgical history that includes Mohs surgery (Right); Dacrocystorhinostomy w/ Rojas tube (Right); Hernia repair; Knee arthroscopy (Right); Tonsillectomy; Eye Ptosis Repair (Bilateral, 5/18/2017); Entropian repair (Right, 7/24/2018); Cervical fusion; Rotator cuff repair (Right); and Tarsorrhaphy (Right, 10/15/2019).    PREVIOUS RADIOTHERAPY OR CHEMOTHERAPY:  XRT as noted    FAMILY HISTORY:  No history of prostate cancer.    SOCIAL HISTORY:  he reports that he has never smoked. He has never used smokeless tobacco. He reports current alcohol use. He reports that he does not use drugs.    PAIN AND PAIN MANAGEMENT:  No pain.    NUTRITIONAL STATUS:   no issues    KPS:  80  PHQ-9 Total Score: distress tool completed.     REVIEW OF SYSTEMS:       General: No fevers, chills, weight change, or drenching night sweats. Skin: No rashes or jaundice.  HEENT: No change in vision or hearing, no headaches.  Neck: No dysphagia or masses.  Heme/Lymph: No easy bruising or bleeding.  Respiratory System: No shortness of breath or cough.  Cardiovascular: No chest pain, palpitations, or dyspnea on exertion.  - Pacemaker. GI: No nausea, vomiting, diarrhea, melena, or hematochezia.  : No dysuria or hematuria.  Endocrine: No heat or cold intolerance. Musculoskeletal: No myalgias or arthralgias.  Neuro: No weakness, numbness, syncope, or seizures. Psych: No mood changes or depression. Ext: Denies swelling.      Objective   VITAL SIGNS:  Vitals:    06/01/22 1448   BP: 152/89   Pulse: 69   Resp: 20   SpO2: 98%         PHYSICAL EXAM:  GENERAL:  No apparent distress. Sitting comfortably in room.    HEENT:  Normocephalic, atraumatic. Pupils are equal, round, reactive to light. Sclera anicteric. Conjunctiva not injected. Oropharynx without erythema, ulcerations or thrush.   NECK:  Supple with no masses.  LYMPHATIC:  No cervical, supraclavicular or axillary adenopathy appreciated bilaterally.   CARDIOVASCULAR:  S1 &  S2 detected; no murmurs, rubs or gallops.  CHEST:  Clear to auscultation bilaterally; no wheezes, crackles or rubs. Work of breathing normal.  ABDOMEN:  Bowel sounds present. Abdomen is soft, nontender, nondistended.   MUSCULOSKELETAL:  No tenderness to palpation along the spine or scapulae. Normal range of motion.  EXTREMITIES:  No clubbing, cyanosis, edema.  SKIN:  No erythema, rashes, ulcerations noted.   NEUROLOGIC:  Cranial nerves II-XII grossly intact bilaterally. No focal neurologic deficits.  PSYCHIATRIC:  Alert, aware, and appropriate.      PERTINENT IMAGING/PATHOLOGY/LABS (Medical Decision Making):      COORDINATION OF CARE:  A copy of this note is sent to the referring provider.    PATHOLOGY (Reviewed): as noted above    IMAGING (Reviewed): as noted above.    LABS (Reviewed):  HEMATOLOGY:  WBC   Date Value Ref Range Status   06/01/2022 6.25 3.40 - 10.80 10*3/mm3 Final     RBC   Date Value Ref Range Status   06/01/2022 4.54 4.14 - 5.80 10*6/mm3 Final     Hemoglobin   Date Value Ref Range Status   06/01/2022 14.7 13.0 - 17.7 g/dL Final     Hematocrit   Date Value Ref Range Status   06/01/2022 42.1 37.5 - 51.0 % Final     Platelets   Date Value Ref Range Status   06/01/2022 193 140 - 450 10*3/mm3 Final     CHEMISTRY:  Lab Results   Component Value Date    GLUCOSE 208 (H) 10/14/2019    BUN 16 10/14/2019    CREATININE 0.99 10/14/2019    EGFRIFNONA 76 10/14/2019    BCR 16.2 10/14/2019    K 4.1 10/14/2019    CO2 27.0 10/14/2019    CALCIUM 8.9 10/14/2019       Assessment & Plan   ASSESSMENT AND PLAN:    NCCN guidelines reviewed    NCCN Unfavorable Intermediate Risk Prostate Cancer, Grade Group 2, max PSA 12.50 ng/mL--     1. Teodora 7(3+4) in 5/16 cores: Left base lateral, right mid, right apex, right base lateral, right mid lateral      2. Raven 6(3+3) in 3/16 cores: Left apex lateral, right base, left anterior mid  Patient discussing his surgical options with Dr. Gautam 6/14/2022 after he gets interval CT  abd/pelvis and bone scan (to be done at [facility] he tells me).    -I discussed XRT + ADT (would need 6 month injections) in the setting of Unfavorable risk disease.  We did discuss spaceOAR and if he does have IMRT/IGRT as his treatment course; I would want a MRI prostate protocol to make sure there is no EPE or contraindication to spaceOAR in this setting.    He also needs new PSA (which I have ordered)    This assessment comes from my review of the imaging, pathology, physician notes and other pertinent information as mentioned.    DISPOSITION:  Interval imaging/PSAs  Discussed with Dr. Gautam.  Patient will f/u closely with us as he is making his final decision.      TIME SPENT WITH PATIENT:  I spent 60 minutes caring for Nash on this date of service. This time includes time spent by me in the following activities: preparing for the visit, reviewing tests, obtaining and/or reviewing a separately obtained history, performing a medically appropriate examination and/or evaluation, referring and communicating with other health care professionals, documenting information in the medical record and care coordination           CC: Beny Gautam MD Masroor, Muhammad I, MD        Addendum: PSA recheck was elevated/increased again to 14.6 ng/ml.  Interval CT CAP/bone scan from [facility]:  Bone Scan  IMPRESSION:   1. Small tiny dot of increased nuclear bone activity in the anterior superior   aspect of the left hip joint of uncertain etiology might represent a single   early metastatic bone disease. Follow-up whole-body nuclear bone scan imaging   would be helpful to further evaluate this finding.    CT AP w/ Contrast  IMPRESSION:   1. Small hiatal hernia.   2. Slight thickening of bladder wall that may relate to incomplete distention   or more chronic bladder outlet issues.   3. Seminal vesicle calcification present.   4. Pars defects L5 with slight anterolisthesis.      I will get these images sent over; and have his  imaging discussed at Tumor board. He has an appt with Dr. Gautam on 6/14/2022 as well.    If XRT, I would likely favor ADT for 6 months + fiducials but no spaceOAR unless MRI prostate to help review for EPE.  MRI might be indicated to better look at the area noted on the bone scan from [facility].  Getting images and next steps to be discussed with Dr. Gautam.    Approved by:     Gavino Whatley MD  06/13/22  11:53 EDT

## 2022-06-02 LAB — PSA SERPL-MCNC: 14.6 NG/ML (ref 0–4)

## 2022-06-13 ENCOUNTER — DOCUMENTATION (OUTPATIENT)
Dept: RADIATION ONCOLOGY | Facility: HOSPITAL | Age: 67
End: 2022-06-13

## 2022-06-13 NOTE — PROGRESS NOTES
Radiation Oncology Nurse Note    Returned call to patient. Patient requesting Lovejoy scores. Provided Lovejoy scores and recent PSA level to patient and informed him Dr. Gavino Whatley has now completed his consultation note therefore he will be able to view the scores through [employer]. Also informed patient of impending Tumor Board discussion regarding his case on 06/21/2022, and the plan for Dr. Whatley and Dr. Beny Gautam (urologist) to discuss his treatment plan after his follow-up appointment with Dr. Gautam tomorrow 06/14/2022. Patient verbalized understanding and agreed with plan. Encouraged patient to call with any further questions or concerns.    Melissa Rogers, RN, BSN  Radiation Oncology Department  [facility]  [phone]  Office  [phone]  Fax

## 2022-06-21 ENCOUNTER — TELEPHONE (OUTPATIENT)
Dept: RADIATION ONCOLOGY | Facility: HOSPITAL | Age: 67
End: 2022-06-21

## 2022-06-21 DIAGNOSIS — C61 ADENOCARCINOMA OF PROSTATE: Primary | ICD-10-CM

## 2022-06-21 NOTE — TELEPHONE ENCOUNTER
Radiation Oncology Nurse Note    Returned call to patient. Informed him of Tumor Board discussion this morning. Recommendation is for 3T MRI of prostate, ADT, and XRT. Patient remains unsure if he would like to proceed with XRT vs. RRP. Has follow-up appointment with Dr. Gautam to discuss his treatment decision on 06/28/2022. Scheduled him to see Dr. Whatley same day at 7:30AM prior to his appointment with Dr. Gautam at 8:30AM. Encouraged patient to write down questions and bring them to his appointment with Dr. Whatley. Patient verbalized understanding and agreed with plan.    Also discussed proceeding with scheduling 3T MRI and patient agreed. Patient scheduled for first available appointment on 07/08/2022 at 9:45PM with 9:30PM arrival. Patient placed on waitlist if sooner appointment is made available. Directions to [facility] and MRI Prostate prep will be provided at follow-up visit with Dr. Whatley.    Melissa Rogers, RN, BSN  Radiation Oncology Department  [facility]  [phone]  Office  [phone]  Fax

## 2022-06-24 NOTE — PROGRESS NOTES
RADIATION ONCOLOGY FOLLOW-UP NOTE    NAME: Nash Cannon  YOB: 1955  MRN #: [id number]  DATE OF SERVICE: 6/28/2022  REFERRING PROVIDER: Beny Gautam MD  [address] [place] [id number]  PRIMARY CARE PROVIDER: Kevin Pinon MD    DIAGNOSIS: Stage IIB, NCCN Unfavorable Intermediate Risk Prostate Cancer, Grade Group 2, max PSA 12.50 ng/mL--     1. Teodora 7(3+4) in 5/16 cores: Left base lateral, right mid, right apex, right base lateral, right mid lateral      2. Teodora 6(3+3) in 3/16 cores: Left apex lateral, right base, left anterior mid  1. Adenocarcinoma of prostate (HCC)      REASON FOR VISIT:  Prostate Cancer; Treatment discussion prior to follow-up with urologist    HISTORY OF PRESENT ILLNESS: The patient is a 67 y.o. year old male who was last seen in our office on 06/01/2022 for consultation. Discussed XRT + ADT (would need 6 month injections) in the setting of unfavorable risk disease. Also discussed spaceOAR and rationale for possibly obtaining MRI prostate. Plan at that time was to obtain current PSA level, complete interval imaging as ordered by Dr. Gautam, and follow him closely as he makes his final decision regarding treatment.    PSA TIMELINE:  03/2014         6.61 ng/mL  07/2014         6.60 ng/mL  01/2017         9.50 ng/mL  12/2017         8.10 ng/mL  05/02/2018    7.15 ng/mL  01/29/2019    9.09 ng/mL                        0.98 ng/mL Free PSA (10.8%)  05/07/2019    7.32 ng/mL                        0.71 ng/mL Free PSA (9.66%)  11/20/2019  10.25 ng/mL  02/2021       10.70 ng/mL  05/06/2021  12.50 ng/mL  06/01/2022  14.60 ng/mL    NM BONE SCAN  DATE: 06/07/2022  FACILITY: [facility]  IMPRESSION:   Small tiny dot of increased nuclear bone activity in the anterior superior aspect of the left hip joint of uncertain etiology might represent a single early metastatic bone disease. Follow-up whole-body nuclear bone scan imaging would be helpful to  further evaluate this finding.     CT ABDOMEN PELVIS W/ AND W/O CONTRAST  DATE: 06/07/2022  FACILITY: [facility]  IMPRESSION:   1. Small hiatal hernia.   2. Slight thickening of bladder wall that may relate to incomplete distention or more chronic bladder outlet issues.   3. Seminal vesicle calcification present.   4. Pars defects L5 with slight anterolisthesis.    Patient discussed at [facility] Tumor Board on 06/21/2022. Recommendation is for 3T MRI of prostate, ADT, and XRT. Patient remains unsure if he would like to proceed with XRT vs. RRP. Has follow-up appointment with Dr. Gautam to discuss his treatment decision following his appointment with us today.    Patient agreed to proceed with scheduling of 3T MRI Prostate at [facility]. Scheduled for first available appointment on 07/08/2022 at 9:45PM.     The following portions of the patient's history were reviewed and updated as appropriate: allergies, current medications, past family history, past medical history, past social history, past surgical history and problem list. Reviewed with the patient and remain unchanged.    PAST MEDICAL HISTORY:  he has a past medical history of Cancer (CMS/HCC), Carpal tunnel syndrome on right, Diabetes mellitus (CMS/HCC), Disease of thyroid gland, Double vision, Eye disorder, Neck pain on right side, Numbness and tingling in right hand, PONV (postoperative nausea and vomiting), Sciatica of right side, and Seasonal allergies.    MEDICATIONS:    Current Outpatient Medications:   •  Continuous Blood Gluc Sensor (FreeStyle El 2 Sensor) misc, See Admin Instructions., Disp: , Rfl:   •  dorzolamide (TRUSOPT) 2 % ophthalmic solution, Administer 1 drop to the right eye 2 (Two) Times a Day., Disp: , Rfl:   •  glucose blood test strip, Accu-Chek Andra Plus Meter, Disp: , Rfl:   •  insulin aspart (novoLOG FLEXPEN) 100 UNIT/ML solution pen-injector sc pen, Novolog Flexpen U-100 Insulin aspart 100 unit/mL (3 mL) subcutaneous  INJECT 1  UNIT FOR EVERY 10 GRAMS CARBS WITH MEALS 3 TIMES DAILY. MAX DAILY 80 UNITS., Disp: , Rfl:   •  Insulin Glargine (BASAGLAR KWIKPEN) 100 UNIT/ML injection pen, Inject 18 Units under the skin into the appropriate area as directed Every Morning., Disp: , Rfl:   •  insulin lispro (humaLOG) 100 UNIT/ML injection, Inject  under the skin into the appropriate area as directed 3 (Three) Times a Day Before Meals. SLIDING SCALE, Disp: , Rfl:   •  latanoprost (XALATAN) 0.005 % ophthalmic solution, latanoprost 0.005 % eye drops  INSTILL 1 DROP INTO LEFT EYE EVERY NIGHT, Disp: , Rfl:   •  levothyroxine (SYNTHROID, LEVOTHROID) 137 MCG tablet, Take 1 tablet by mouth Daily., Disp: , Rfl:     ALLERGIES:  No Known Allergies    PAST SURGICAL HISTORY:  he has a past surgical history that includes Mohs surgery (Right); Dacrocystorhinostomy w/ Rojas tube (Right); Hernia repair; Knee arthroscopy (Right); Tonsillectomy; Eye Ptosis Repair (Bilateral, 5/18/2017); Entropian repair (Right, 7/24/2018); Cervical fusion; Rotator cuff repair (Right); and Tarsorrhaphy (Right, 10/15/2019).    PREVIOUS RADIOTHERAPY OR CHEMOTHERAPY: MOHS surgery 10 years ago, adjuvant XRT at [facility], had cataract, complications and glaucoma    FAMILY HISTORY:  No history of prostate cancer.    SOCIAL HISTORY:  he reports that he has never smoked. He has never used smokeless tobacco. He reports current alcohol use. He reports that he does not use drugs.    PAIN AND PAIN MANAGEMENT:  No pain.  Vitals:    06/28/22 0727   BP: 132/79   Pulse: 70   Resp: 20   SpO2: 99%   Weight: 81.3 kg (179 lb 3.2 oz)   PainSc: 0-No pain       NUTRITIONAL STATUS:   no issues    KPS:  90       REVIEW OF SYSTEMS:   General: No fevers, chills, weight change, or drenching night sweats. Skin: No rashes or jaundice.  HEENT: No change in vision or hearing, no headaches.  Neck: No dysphagia or masses.  Heme/Lymph: No easy bruising or bleeding.  Respiratory System: No shortness of breath or cough.   Cardiovascular: No chest pain, palpitations, or dyspnea on exertion.  - Pacemaker. GI: No nausea, vomiting, diarrhea, melena, or hematochezia.  : No dysuria or hematuria.  Endocrine: No heat or cold intolerance. Musculoskeletal: No myalgias or arthralgias.  Neuro: No weakness, numbness, syncope, or seizures. Psych: No mood changes or depression. Ext: Denies swelling.        Objective   VITAL SIGNS:  Vitals:    06/28/22 0727   BP: 132/79   Pulse: 70   Resp: 20   SpO2: 99%   Weight: 81.3 kg (179 lb 3.2 oz)   PainSc: 0-No pain       PHYSICAL EXAM:  GENERAL: In no apparent distress, sitting comfortably in room.    CARDIOVASCULAR: S1 & S2 detected; no murmurs, rubs or gallops.  CHEST: Clear to auscultation bilaterally; no wheezes, crackles or rubs. Work of breathing normal.  ABDOMEN: Bowel sounds present. Abdomen is soft, nontender, nondistended. No suprapubic tenderness or pain.  MUSCULOSKELETAL: No tenderness to palpation along the spine or scapulae. Normal range of motion.  EXTREMITIES: No clubbing, cyanosis, edema.  SKIN: No erythema, rashes, ulcerations noted.   NEUROLOGIC: Cranial nerves II-XII grossly intact bilaterally. No focal neurologic deficits.  PSYCHIATRIC:  Alert, aware, and appropriate.      PERTINENT IMAGING/PATHOLOGY/LABS (Medical Decision Making):     COORDINATION OF CARE: A copy of this note is sent to the referring provider.    PATHOLOGY (Reviewed): as noted above.    IMAGING (Reviewed): as noted above; reviewed at mult-D conference.    LABS (Reviewed):  HEMATOLOGY:  WBC   Date Value Ref Range Status   06/01/2022 6.25 3.40 - 10.80 10*3/mm3 Final     RBC   Date Value Ref Range Status   06/01/2022 4.54 4.14 - 5.80 10*6/mm3 Final     Hemoglobin   Date Value Ref Range Status   06/01/2022 14.7 13.0 - 17.7 g/dL Final     Hematocrit   Date Value Ref Range Status   06/01/2022 42.1 37.5 - 51.0 % Final     Platelets   Date Value Ref Range Status   06/01/2022 193 140 - 450 10*3/mm3 Final     CHEMISTRY:  Lab  Results   Component Value Date    GLUCOSE 208 (H) 10/14/2019    BUN 16 10/14/2019    CREATININE 0.99 10/14/2019    EGFRIFNONA 76 10/14/2019    BCR 16.2 10/14/2019    K 4.1 10/14/2019    CO2 27.0 10/14/2019    CALCIUM 8.9 10/14/2019       Assessment & Plan   ASSESSMENT AND PLAN:    1. Adenocarcinoma of prostate (HCC)       NCCN unfavorable intermediate risk   -Reviewed his GS, PSA velocity, exam  -Reviewed XRT risks  -Reviewed his spaceOAR indications and role of MRI to make sure no EPE  -Reviewed briefly surgery and complications, meeting with Dr. Gautam today.    He seems to be favoring surgery, given his prior complication from XRT on his Rt eye but wants to meet with Dr. Gautam because he still has outstanding questions.      This assessment comes from my review of the imaging, pathology, physician notes and other pertinent information as mentioned.    DISPOSITION: pending, MRI to rule out EPE and to help with planning of XRT is scheduled.      TIME SPENT WITH PATIENT:   I spent 30 minutes caring for Nash on this date of service. This time includes time spent by me in the following activities: preparing for the visit, reviewing tests, obtaining and/or reviewing a separately obtained history, performing a medically appropriate examination and/or evaluation, counseling and educating the patient/family/caregiver, ordering medications, tests, or procedures, documenting information in the medical record, independently interpreting results and communicating that information with the patient/family/caregiver and care coordination      CC: MD Kevin Benitez MD John A Cox, MD  6/28/2022  08:00 AM EDT

## 2022-06-27 ENCOUNTER — HOSPITAL ENCOUNTER (OUTPATIENT)
Dept: RADIATION ONCOLOGY | Facility: HOSPITAL | Age: 67
Setting detail: RADIATION/ONCOLOGY SERIES
End: 2022-06-27

## 2022-06-28 ENCOUNTER — OFFICE VISIT (OUTPATIENT)
Dept: RADIATION ONCOLOGY | Facility: HOSPITAL | Age: 67
End: 2022-06-28

## 2022-06-28 VITALS
WEIGHT: 179.2 LBS | RESPIRATION RATE: 20 BRPM | DIASTOLIC BLOOD PRESSURE: 79 MMHG | OXYGEN SATURATION: 99 % | HEART RATE: 70 BPM | BODY MASS INDEX: 24.99 KG/M2 | SYSTOLIC BLOOD PRESSURE: 132 MMHG

## 2022-06-28 DIAGNOSIS — C61 ADENOCARCINOMA OF PROSTATE: Primary | ICD-10-CM

## 2022-06-28 PROCEDURE — G0463 HOSPITAL OUTPT CLINIC VISIT: HCPCS | Performed by: RADIOLOGY

## 2022-06-28 PROCEDURE — 99214 OFFICE O/P EST MOD 30 MIN: CPT | Performed by: RADIOLOGY

## 2022-06-29 ENCOUNTER — TELEPHONE (OUTPATIENT)
Dept: RADIATION ONCOLOGY | Facility: HOSPITAL | Age: 67
End: 2022-06-29

## 2022-06-29 NOTE — TELEPHONE ENCOUNTER
Radiation Oncology Nurse Note    Called centralized scheduling to attempt to change MRI appointment to a different time of day, per patient request. Was told there were no appointments available until 07/28/2022.    Called and notified patient of information. Patient asked if another facility would be able to get him in for scan timely. Stated [facility] has a 3T machine and would be the only other facility in this area that could perform the scan. Said that I would be happy to find out if they could do his scan.    Called [facility] centralized scheduling. Was informed [facility] has multiple 3T MRI machines and would be able to scan patient as early as tomorrow 06/30/2022 with additional openings over the weekend.    LVM with patient stating that [facility] has much more availability for MRI Prostate than [facility]. Requested return call with preferred dates for scan. Plan to call and schedule appointment with [facility] then complete prior auth for scan.    Melissa Rogers RN, BSN  Radiation Oncology Department  [facility]  [phone]  Office  [phone]  Fax

## 2022-06-30 ENCOUNTER — TELEPHONE (OUTPATIENT)
Dept: RADIATION ONCOLOGY | Facility: HOSPITAL | Age: 67
End: 2022-06-30

## 2022-06-30 DIAGNOSIS — C61 ADENOCARCINOMA OF PROSTATE: Primary | ICD-10-CM

## 2022-07-06 ENCOUNTER — TELEPHONE (OUTPATIENT)
Dept: ONCOLOGY | Facility: OTHER | Age: 67
End: 2022-07-06

## 2022-07-08 ENCOUNTER — APPOINTMENT (OUTPATIENT)
Dept: MRI IMAGING | Facility: HOSPITAL | Age: 67
End: 2022-07-08

## 2022-07-11 ENCOUNTER — HOSPITAL ENCOUNTER (OUTPATIENT)
Dept: RADIATION ONCOLOGY | Facility: HOSPITAL | Age: 67
Setting detail: RADIATION/ONCOLOGY SERIES
End: 2022-07-11

## 2022-07-13 ENCOUNTER — OFFICE VISIT (OUTPATIENT)
Dept: RADIATION ONCOLOGY | Facility: HOSPITAL | Age: 67
End: 2022-07-13

## 2022-07-13 VITALS
WEIGHT: 178.66 LBS | DIASTOLIC BLOOD PRESSURE: 82 MMHG | HEART RATE: 70 BPM | TEMPERATURE: 97.1 F | OXYGEN SATURATION: 98 % | RESPIRATION RATE: 18 BRPM | BODY MASS INDEX: 24.92 KG/M2 | SYSTOLIC BLOOD PRESSURE: 146 MMHG

## 2022-07-13 DIAGNOSIS — C61 ADENOCARCINOMA OF PROSTATE: Primary | ICD-10-CM

## 2022-07-13 PROCEDURE — 99214 OFFICE O/P EST MOD 30 MIN: CPT | Performed by: RADIOLOGY

## 2022-07-13 PROCEDURE — G0463 HOSPITAL OUTPT CLINIC VISIT: HCPCS

## 2022-11-23 ENCOUNTER — APPOINTMENT (OUTPATIENT)
Dept: GENERAL RADIOLOGY | Age: 67
End: 2022-11-23
Attending: NURSE PRACTITIONER
Payer: MEDICARE

## 2022-11-23 ENCOUNTER — HOSPITAL ENCOUNTER (OUTPATIENT)
Age: 67
Discharge: HOME | End: 2022-11-23
Payer: MEDICARE

## 2022-11-23 VITALS
HEART RATE: 108 BPM | TEMPERATURE: 98 F | RESPIRATION RATE: 18 BRPM | OXYGEN SATURATION: 98 % | SYSTOLIC BLOOD PRESSURE: 160 MMHG | DIASTOLIC BLOOD PRESSURE: 86 MMHG

## 2022-11-23 DIAGNOSIS — J98.8 VIRAL RESPIRATORY ILLNESS: Primary | ICD-10-CM

## 2022-11-23 DIAGNOSIS — B97.89 VIRAL RESPIRATORY ILLNESS: Primary | ICD-10-CM

## 2022-11-23 LAB
POCT INFLUENZA A: NEGATIVE
POCT INFLUENZA B: NEGATIVE
SARS-COV-2 RNA RESP QL NAA+PROBE: NOT DETECTED

## 2022-11-23 PROCEDURE — 71046 X-RAY EXAM CHEST 2 VIEWS: CPT | Performed by: NURSE PRACTITIONER

## 2022-11-23 NOTE — ED INITIAL ASSESSMENT (HPI)
Pt presents to the IC with c/o cough, nasal congestion for past week. Feels cough has worsened in past few days. Negative at home covid test. No fevers.

## 2022-12-27 NOTE — RESEARCH
Chart reviewed to determine eligibility for research trials.Currently no trials available.   Micaela Simon DO  [facility] SHANICE Mart is a 53 

## 2023-02-26 ENCOUNTER — HOSPITAL ENCOUNTER (INPATIENT)
Facility: HOSPITAL | Age: 68
LOS: 2 days | Discharge: HOME | End: 2023-02-28
Attending: EMERGENCY MEDICINE | Admitting: INTERNAL MEDICINE
Payer: MEDICARE

## 2023-02-26 ENCOUNTER — APPOINTMENT (OUTPATIENT)
Dept: GENERAL RADIOLOGY | Facility: HOSPITAL | Age: 68
End: 2023-02-26
Attending: NURSE PRACTITIONER
Payer: MEDICARE

## 2023-02-26 ENCOUNTER — APPOINTMENT (OUTPATIENT)
Dept: CT IMAGING | Facility: HOSPITAL | Age: 68
End: 2023-02-26
Attending: EMERGENCY MEDICINE
Payer: MEDICARE

## 2023-02-26 DIAGNOSIS — R10.9 ABDOMINAL PAIN, ACUTE: Primary | ICD-10-CM

## 2023-02-26 DIAGNOSIS — D72.829 LEUKOCYTOSIS, UNSPECIFIED TYPE: ICD-10-CM

## 2023-02-26 DIAGNOSIS — K56.609 SBO (SMALL BOWEL OBSTRUCTION) (HCC): ICD-10-CM

## 2023-02-26 LAB
ALBUMIN SERPL-MCNC: 4.3 G/DL (ref 3.4–5)
ALP LIVER SERPL-CCNC: 186 U/L
ALT SERPL-CCNC: 52 U/L
ANION GAP SERPL CALC-SCNC: 7 MMOL/L (ref 0–18)
AST SERPL-CCNC: 25 U/L (ref 15–37)
BASOPHILS # BLD AUTO: 0.05 X10(3) UL (ref 0–0.2)
BASOPHILS NFR BLD AUTO: 0.2 %
BILIRUB DIRECT SERPL-MCNC: 0.2 MG/DL (ref 0–0.2)
BILIRUB SERPL-MCNC: 0.7 MG/DL (ref 0.1–2)
BUN BLD-MCNC: 45 MG/DL (ref 7–18)
BUN/CREAT SERPL: 16 (ref 10–20)
CALCIUM BLD-MCNC: 9.1 MG/DL (ref 8.5–10.1)
CHLORIDE SERPL-SCNC: 114 MMOL/L (ref 98–112)
CO2 SERPL-SCNC: 22 MMOL/L (ref 21–32)
CREAT BLD-MCNC: 2.81 MG/DL
DEPRECATED RDW RBC AUTO: 50.1 FL (ref 35.1–46.3)
EOSINOPHIL # BLD AUTO: 0.04 X10(3) UL (ref 0–0.7)
EOSINOPHIL NFR BLD AUTO: 0.2 %
ERYTHROCYTE [DISTWIDTH] IN BLOOD BY AUTOMATED COUNT: 15.3 % (ref 11–15)
EST. AVERAGE GLUCOSE BLD GHB EST-MCNC: 128 MG/DL (ref 68–126)
GFR SERPLBLD BASED ON 1.73 SQ M-ARVRAT: 24 ML/MIN/1.73M2 (ref 60–?)
GLUCOSE BLD-MCNC: 156 MG/DL (ref 70–99)
HBA1C MFR BLD: 6.1 % (ref ?–5.7)
HCT VFR BLD AUTO: 38.5 %
HGB BLD-MCNC: 12.1 G/DL
IMM GRANULOCYTES # BLD AUTO: 0.13 X10(3) UL (ref 0–1)
IMM GRANULOCYTES NFR BLD: 0.6 %
INR BLD: 1 (ref 0.85–1.16)
LACTATE SERPL-SCNC: 1.1 MMOL/L (ref 0.4–2)
LIPASE SERPL-CCNC: 190 U/L (ref 73–393)
LIPASE SERPL-CCNC: 48 U/L (ref 13–75)
LYMPHOCYTES # BLD AUTO: 0.34 X10(3) UL (ref 1–4)
LYMPHOCYTES NFR BLD AUTO: 1.6 %
MCH RBC QN AUTO: 28.5 PG (ref 26–34)
MCHC RBC AUTO-ENTMCNC: 31.4 G/DL (ref 31–37)
MCV RBC AUTO: 90.6 FL
MONOCYTES # BLD AUTO: 1.15 X10(3) UL (ref 0.1–1)
MONOCYTES NFR BLD AUTO: 5.4 %
NEUTROPHILS # BLD AUTO: 19.55 X10 (3) UL (ref 1.5–7.7)
NEUTROPHILS # BLD AUTO: 19.55 X10(3) UL (ref 1.5–7.7)
NEUTROPHILS NFR BLD AUTO: 92 %
OSMOLALITY SERPL CALC.SUM OF ELEC: 311 MOSM/KG (ref 275–295)
PLATELET # BLD AUTO: 137 10(3)UL (ref 150–450)
POTASSIUM SERPL-SCNC: 4.7 MMOL/L (ref 3.5–5.1)
PROT SERPL-MCNC: 8.1 G/DL (ref 6.4–8.2)
PROTHROMBIN TIME: 13.1 SECONDS (ref 11.6–14.8)
RBC # BLD AUTO: 4.25 X10(6)UL
SARS-COV-2 RNA RESP QL NAA+PROBE: NOT DETECTED
SODIUM SERPL-SCNC: 143 MMOL/L (ref 136–145)
WBC # BLD AUTO: 21.3 X10(3) UL (ref 4–11)

## 2023-02-26 PROCEDURE — 71045 X-RAY EXAM CHEST 1 VIEW: CPT | Performed by: NURSE PRACTITIONER

## 2023-02-26 PROCEDURE — 74176 CT ABD & PELVIS W/O CONTRAST: CPT | Performed by: EMERGENCY MEDICINE

## 2023-02-26 PROCEDURE — 99223 1ST HOSP IP/OBS HIGH 75: CPT | Performed by: SURGERY

## 2023-02-26 RX ORDER — MORPHINE SULFATE 4 MG/ML
4 INJECTION, SOLUTION INTRAMUSCULAR; INTRAVENOUS ONCE
Status: COMPLETED | OUTPATIENT
Start: 2023-02-26 | End: 2023-02-26

## 2023-02-26 RX ORDER — LEVOTHYROXINE SODIUM 175 UG/1
175 TABLET ORAL
Status: DISCONTINUED | OUTPATIENT
Start: 2023-02-27 | End: 2023-02-28

## 2023-02-26 RX ORDER — ONDANSETRON 2 MG/ML
4 INJECTION INTRAMUSCULAR; INTRAVENOUS ONCE
Status: COMPLETED | OUTPATIENT
Start: 2023-02-26 | End: 2023-02-26

## 2023-02-26 RX ORDER — NICOTINE POLACRILEX 4 MG
15 LOZENGE BUCCAL
Status: DISCONTINUED | OUTPATIENT
Start: 2023-02-26 | End: 2023-02-28

## 2023-02-26 RX ORDER — DEXTROSE MONOHYDRATE 25 G/50ML
50 INJECTION, SOLUTION INTRAVENOUS
Status: DISCONTINUED | OUTPATIENT
Start: 2023-02-26 | End: 2023-02-28

## 2023-02-26 RX ORDER — DIPHENHYDRAMINE HYDROCHLORIDE 50 MG/ML
50 INJECTION INTRAMUSCULAR; INTRAVENOUS NIGHTLY PRN
Status: DISPENSED | OUTPATIENT
Start: 2023-02-26 | End: 2023-02-27

## 2023-02-26 RX ORDER — ROSUVASTATIN CALCIUM 5 MG/1
5 TABLET, COATED ORAL NIGHTLY
Status: DISCONTINUED | OUTPATIENT
Start: 2023-02-26 | End: 2023-02-28

## 2023-02-26 RX ORDER — AMLODIPINE BESYLATE 5 MG/1
5 TABLET ORAL DAILY
Status: DISCONTINUED | OUTPATIENT
Start: 2023-02-26 | End: 2023-02-28

## 2023-02-26 RX ORDER — MORPHINE SULFATE 2 MG/ML
2 INJECTION, SOLUTION INTRAMUSCULAR; INTRAVENOUS EVERY 6 HOURS PRN
Status: DISPENSED | OUTPATIENT
Start: 2023-02-26 | End: 2023-02-27

## 2023-02-26 RX ORDER — NICOTINE POLACRILEX 4 MG
30 LOZENGE BUCCAL
Status: DISCONTINUED | OUTPATIENT
Start: 2023-02-26 | End: 2023-02-28

## 2023-02-26 RX ORDER — LISINOPRIL 40 MG/1
40 TABLET ORAL DAILY
Status: DISCONTINUED | OUTPATIENT
Start: 2023-02-26 | End: 2023-02-28

## 2023-02-26 RX ORDER — DEXTROSE AND SODIUM CHLORIDE 5; .45 G/100ML; G/100ML
INJECTION, SOLUTION INTRAVENOUS CONTINUOUS
Status: DISCONTINUED | OUTPATIENT
Start: 2023-02-26 | End: 2023-02-28

## 2023-02-26 NOTE — ED INITIAL ASSESSMENT (HPI)
Abd pain that started today; middle abd that goes to his back; HX of SBO/complicated GI issues. Last BM was today; small. No fevers. Vomiting x4 today.

## 2023-02-27 LAB
ANION GAP SERPL CALC-SCNC: 5 MMOL/L (ref 0–18)
BUN BLD-MCNC: 44 MG/DL (ref 7–18)
BUN/CREAT SERPL: 16.2 (ref 10–20)
CALCIUM BLD-MCNC: 8.2 MG/DL (ref 8.5–10.1)
CHLORIDE SERPL-SCNC: 118 MMOL/L (ref 98–112)
CO2 SERPL-SCNC: 22 MMOL/L (ref 21–32)
CREAT BLD-MCNC: 2.71 MG/DL
DEPRECATED RDW RBC AUTO: 50.1 FL (ref 35.1–46.3)
ERYTHROCYTE [DISTWIDTH] IN BLOOD BY AUTOMATED COUNT: 15.3 % (ref 11–15)
GFR SERPLBLD BASED ON 1.73 SQ M-ARVRAT: 25 ML/MIN/1.73M2 (ref 60–?)
GLUCOSE BLD-MCNC: 152 MG/DL (ref 70–99)
HCT VFR BLD AUTO: 32.5 %
HGB BLD-MCNC: 10.5 G/DL
MAGNESIUM SERPL-MCNC: 1.3 MG/DL (ref 1.6–2.6)
MCH RBC QN AUTO: 29.4 PG (ref 26–34)
MCHC RBC AUTO-ENTMCNC: 32.3 G/DL (ref 31–37)
MCV RBC AUTO: 91 FL
OSMOLALITY SERPL CALC.SUM OF ELEC: 314 MOSM/KG (ref 275–295)
PHOSPHATE SERPL-MCNC: 3.6 MG/DL (ref 2.5–4.9)
PLATELET # BLD AUTO: 102 10(3)UL (ref 150–450)
POTASSIUM SERPL-SCNC: 4.3 MMOL/L (ref 3.5–5.1)
RBC # BLD AUTO: 3.57 X10(6)UL
SODIUM SERPL-SCNC: 145 MMOL/L (ref 136–145)
WBC # BLD AUTO: 17.5 X10(3) UL (ref 4–11)

## 2023-02-27 PROCEDURE — 99233 SBSQ HOSP IP/OBS HIGH 50: CPT | Performed by: SURGERY

## 2023-02-27 PROCEDURE — 99223 1ST HOSP IP/OBS HIGH 75: CPT | Performed by: INTERNAL MEDICINE

## 2023-02-27 RX ORDER — MAGNESIUM OXIDE 400 MG/1
400 TABLET ORAL ONCE
Status: COMPLETED | OUTPATIENT
Start: 2023-02-27 | End: 2023-02-27

## 2023-02-27 RX ORDER — SODIUM CHLORIDE 9 MG/ML
INJECTION, SOLUTION INTRAVENOUS ONCE
Status: COMPLETED | OUTPATIENT
Start: 2023-02-27 | End: 2023-02-27

## 2023-02-27 NOTE — PLAN OF CARE
Patient admitted overnight for partial SBO. NGT in place at 65 cm to LIS producing green output. Patient strict NPO and IVF for bowel rest. PRN Morphine given for abdominal pain. Patient independent, AO X 4, continent. Familiar with plan due to HX of SBO. Patient instructed of use of call light and to alert nursing staff with any new sxs. Problem: Patient Centered Care  Goal: Patient preferences are identified and integrated in the patient's plan of care  Description: Interventions:  - What would you like us to know as we care for you? I live with my wife.   - Provide timely, complete, and accurate information to patient/family  - Incorporate patient and family knowledge, values, beliefs, and cultural backgrounds into the planning and delivery of care  - Encourage patient/family to participate in care and decision-making at the level they choose  - Honor patient and family perspectives and choices  Outcome: Progressing       Problem: PAIN - ADULT  Goal: Verbalizes/displays adequate comfort level or patient's stated pain goal  Description: INTERVENTIONS:  - Encourage pt to monitor pain and request assistance  - Assess pain using appropriate pain scale  - Administer analgesics based on type and severity of pain and evaluate response  - Implement non-pharmacological measures as appropriate and evaluate response  - Consider cultural and social influences on pain and pain management  - Manage/alleviate anxiety  - Utilize distraction and/or relaxation techniques  - Monitor for opioid side effects  - Notify MD/LIP if interventions unsuccessful or patient reports new pain  - Anticipate increased pain with activity and pre-medicate as appropriate  Outcome: Progressing

## 2023-02-27 NOTE — PROGRESS NOTES
General surgery consult    Chart review, full dictated consult to follow. Partial small bowel obstruction. Plan, NPO, and G-tube, decompression, IV fluids.

## 2023-02-27 NOTE — PLAN OF CARE
Problem: Patient Centered Care  Goal: Patient preferences are identified and integrated in the patient's plan of care  Description: Interventions:  - What would you like us to know as we care for you?   - Provide timely, complete, and accurate information to patient/family  - Incorporate patient and family knowledge, values, beliefs, and cultural backgrounds into the planning and delivery of care  - Encourage patient/family to participate in care and decision-making at the level they choose  - Honor patient and family perspectives and choices  Outcome: Not Progressing     Problem: PAIN - ADULT  Goal: Verbalizes/displays adequate comfort level or patient's stated pain goal  Description: INTERVENTIONS:  - Encourage pt to monitor pain and request assistance  - Assess pain using appropriate pain scale  - Administer analgesics based on type and severity of pain and evaluate response  - Implement non-pharmacological measures as appropriate and evaluate response  - Consider cultural and social influences on pain and pain management  - Manage/alleviate anxiety  - Utilize distraction and/or relaxation techniques  - Monitor for opioid side effects  - Notify MD/LIP if interventions unsuccessful or patient reports new pain  - Anticipate increased pain with activity and pre-medicate as appropriate  Outcome: Not Progressing   Patient's VSS, denies any N/V/D. NGT removed today as per orders. Started on CLD and tolerating well. Up ad seng, steady gait. Pt needs addressed, call light within reach.

## 2023-02-28 VITALS
SYSTOLIC BLOOD PRESSURE: 128 MMHG | HEIGHT: 70.87 IN | WEIGHT: 152 LBS | HEART RATE: 103 BPM | TEMPERATURE: 98 F | DIASTOLIC BLOOD PRESSURE: 77 MMHG | OXYGEN SATURATION: 100 % | BODY MASS INDEX: 21.28 KG/M2 | RESPIRATION RATE: 18 BRPM

## 2023-02-28 LAB
ANION GAP SERPL CALC-SCNC: 7 MMOL/L (ref 0–18)
BUN BLD-MCNC: 34 MG/DL (ref 7–18)
BUN/CREAT SERPL: 13 (ref 10–20)
CALCIUM BLD-MCNC: 8.1 MG/DL (ref 8.5–10.1)
CHLORIDE SERPL-SCNC: 112 MMOL/L (ref 98–112)
CO2 SERPL-SCNC: 22 MMOL/L (ref 21–32)
CREAT BLD-MCNC: 2.61 MG/DL
DEPRECATED RDW RBC AUTO: 49.9 FL (ref 35.1–46.3)
ERYTHROCYTE [DISTWIDTH] IN BLOOD BY AUTOMATED COUNT: 14.9 % (ref 11–15)
GFR SERPLBLD BASED ON 1.73 SQ M-ARVRAT: 26 ML/MIN/1.73M2 (ref 60–?)
GLUCOSE BLD-MCNC: 129 MG/DL (ref 70–99)
HCT VFR BLD AUTO: 29.2 %
HGB BLD-MCNC: 9.4 G/DL
MAGNESIUM SERPL-MCNC: 1.4 MG/DL (ref 1.6–2.6)
MCH RBC QN AUTO: 29.4 PG (ref 26–34)
MCHC RBC AUTO-ENTMCNC: 32.2 G/DL (ref 31–37)
MCV RBC AUTO: 91.3 FL
OSMOLALITY SERPL CALC.SUM OF ELEC: 301 MOSM/KG (ref 275–295)
PLATELET # BLD AUTO: 82 10(3)UL (ref 150–450)
POTASSIUM SERPL-SCNC: 3.7 MMOL/L (ref 3.5–5.1)
RBC # BLD AUTO: 3.2 X10(6)UL
SODIUM SERPL-SCNC: 141 MMOL/L (ref 136–145)
WBC # BLD AUTO: 8.4 X10(3) UL (ref 4–11)

## 2023-02-28 PROCEDURE — 99233 SBSQ HOSP IP/OBS HIGH 50: CPT | Performed by: INTERNAL MEDICINE

## 2023-02-28 PROCEDURE — 99232 SBSQ HOSP IP/OBS MODERATE 35: CPT | Performed by: SURGERY

## 2023-02-28 RX ORDER — MAGNESIUM OXIDE 400 MG/1
600 TABLET ORAL ONCE
Status: COMPLETED | OUTPATIENT
Start: 2023-02-28 | End: 2023-02-28

## 2023-02-28 NOTE — PLAN OF CARE
General surgery cleared to eat low fiber soft diet and discharge. MD cleared to DC, this RN educated patient on discharge AVS and follow up recommendations. All questions answered. IV removed. Patient given AVS and will be wheeled to family vehicle when able.   Problem: Patient Centered Care  Goal: Patient preferences are identified and integrated in the patient's plan of care  Description: Interventions:  - What would you like us to know as we care for you?   - Provide timely, complete, and accurate information to patient/family  - Incorporate patient and family knowledge, values, beliefs, and cultural backgrounds into the planning and delivery of care  - Encourage patient/family to participate in care and decision-making at the level they choose  - Honor patient and family perspectives and choices  Outcome: Completed     Problem: Patient/Family Goals  Goal: Patient/Family Long Term Goal  Description: Patient's Long Term Goal:     Interventions:  -   - See additional Care Plan goals for specific interventions  Outcome: Completed  Goal: Patient/Family Short Term Goal  Description: Patient's Short Term Goal:     Interventions:   -   - See additional Care Plan goals for specific interventions  Outcome: Completed     Problem: PAIN - ADULT  Goal: Verbalizes/displays adequate comfort level or patient's stated pain goal  Description: INTERVENTIONS:  - Encourage pt to monitor pain and request assistance  - Assess pain using appropriate pain scale  - Administer analgesics based on type and severity of pain and evaluate response  - Implement non-pharmacological measures as appropriate and evaluate response  - Consider cultural and social influences on pain and pain management  - Manage/alleviate anxiety  - Utilize distraction and/or relaxation techniques  - Monitor for opioid side effects  - Notify MD/LIP if interventions unsuccessful or patient reports new pain  - Anticipate increased pain with activity and pre-medicate as appropriate  Outcome: Completed

## 2023-02-28 NOTE — PROGRESS NOTES
General surgery note    Patient doing well. Several bowel movements overnight. Plan to advance to a soft diet and discharge home from surgery standpoint.

## 2023-02-28 NOTE — DISCHARGE SUMMARY
[facility]    Discharge Summary    Corby Adams Patient Status:  Inpatient    3/16/1955 MRN [id number]   Location [facility] 5SW/SE Attending Lary Saenz MD   [facility] Day # 2 PCP PHYSICIAN NONSTAFF     Date of Admission: 2023   Date of Discharge: 2023    Admitting Diagnosis: SBO (small bowel obstruction) ([address] [K56.609]  Abdominal pain, acute [R10.9]  Leukocytosis, unspecified type [D72.829]    Disposition: Home    Discharge Diagnosis: . Principal Problem:    Abdominal pain, acute  Active Problems:    Leukocytosis, unspecified type    SBO (small bowel obstruction) (HCC)    CKD (chronic kidney disease), stage IV [facility])      Hospital Course:   Reason for Admission: Abd pain , SBO    Discharge Physical Exam: General appearance: alert, appears stated age and cooperative  Pulmonary:  clear to auscultation bilaterally  Cardiovascular: S1, S2 normal, no murmur, click, rub or gallop, regular rate and rhythm, S1, S2 normal  Abdominal: soft, non-tender; bowel sounds normal; no masses,  no organomegaly  Extremities: extremities normal, atraumatic, no cyanosis or edema  Pulses: 2+ and symmetric  Neurologic: Grossly normal    Hospital Course: Pt seen by surgery NG DCed tolerating PO diet. Renal function improved D/W renal okay to DC home    Complications: None    Consultants       Provider   Role Specialty     Andrea Noland MD  Consulting Physician --     Reta Hartman MD  Consulting Physician --     Caitlin Charles MD  Consulting Physician --          Pending Labs     Order Current Status    Blood Culture Preliminary result          Discharge Plan:   Discharge Condition: Stable    Current Discharge Medication List    Home Meds - Unchanged    COPPER OR  Take by mouth. SODIUM BICARBONATE OR  Take by mouth. Cyanocobalamin (VITAMIN B-12 OR)  Take by mouth. acetaminophen 325 MG Oral Tab  Take 2 tablets (650 mg total) by mouth every 6 (six) hours as needed.     Cholecalciferol (VITAMIN D) 1000 units Oral Tab  Take 1,000 Units by mouth daily    Rosuvastatin Calcium 5 MG Oral Tab  Take 1 tablet (5 mg total) by mouth nightly. Levothyroxine Sodium 175 MCG Oral Tab  Take 1 tablet (175 mcg total) by mouth before breakfast. Patient takes on Wednesdays and Sundays    Only takes 0.5 tablet    allopurinol (ZYLOPRIM) 300 MG Oral Tab  Take 1 tablet (300 mg total) by mouth daily. amLODIPine 5 MG Oral Tab  Take 1 tablet (5 mg total) by mouth daily. lisinopril (PRINIVIL,ZESTRIL) 40 MG Oral Tab  Take 1 tablet (40 mg total) by mouth daily. Magnesium Oxide 400 (240 MG) MG Oral Tab  Take 2 tablets (800 mg total) by mouth 2 (two) times daily. benzonatate 100 MG Oral Cap  Take 1 capsule (100 mg total) by mouth 3 (three) times daily as needed for cough. ondansetron 4 MG Oral Tablet Dispersible  Take 1 tablet (4 mg total) by mouth every 8 (eight) hours as needed for Nausea. Discharge Diet: As tolerated    Discharge Activity: As tolerated    Follow up:        Follow up Labs: BMP in 3-4 days        CHIP ROJAS [facility] MD, Garcia Dick MD  2/28/2023

## 2023-02-28 NOTE — PLAN OF CARE
Problem: Patient Centered Care  Goal: Patient preferences are identified and integrated in the patient's plan of care  Description: Interventions:  - What would you like us to know as we care for you?  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  - Provide timely, complete, and accurate information to patient/family  - Incorporate patient and family knowledge, values, beliefs, and cultural backgrounds into the planning and delivery of care  - Encourage patient/family to participate in care and decision-making at the level they choose  - Honor patient and family perspectives and choices  Outcome: Progressing    Goal: Patient/Family Short Term Goal  Description: Patient's Short Term Goal: To be free of abdominal pain/discomfort    Interventions:   - Continue current POC  - See additional Care Plan goals for specific interventions  Outcome: Progressing     Problem: PAIN - ADULT  Goal: Verbalizes/displays adequate comfort level or patient's stated pain goal  Description: INTERVENTIONS:  - Encourage pt to monitor pain and request assistance  - Assess pain using appropriate pain scale  - Administer analgesics based on type and severity of pain and evaluate response  - Implement non-pharmacological measures as appropriate and evaluate response  - Consider cultural and social influences on pain and pain management  - Manage/alleviate anxiety  - Utilize distraction and/or relaxation techniques  - Monitor for opioid side effects  - Notify MD/LIP if interventions unsuccessful or patient reports new pain  - Anticipate increased pain with activity and pre-medicate as appropriate  Outcome: Progressing

## 2023-10-16 NOTE — IP AVS SNAPSHOT
[address]  [address] [facility] [phone]                Discharge Summary   1/19/2017    [facility]           Admission Information        Provider Department    1/19/2017 Shanta Hennessy MD [facility] 4w NORVASC 5 MG Tabs   Last time this was given:  5 mg on 1/23/2017  8:11 AM   Generic drug: AmLODIPine Besylate        Take 5 mg by mouth daily. Follow-up Information     Follow up with Pcp, None In 2 weeks.     Contact 0.2 (01/22/17)  0.0    (01/21/17)  78 (01/21/17)  12 (01/21/17)  9 (01/21/17)  1 (01/21/17)  0  (01/21/17)  6.6 (01/21/17)  1.0 (01/21/17)  0.7 (01/21/17)  0.1 (01/21/17)  0.0      Metabolic Lab Results  (Last result in the past 90 days)    HgbA1C Glucose your Zip Code and Date of Birth to complete the sign-up process. If you do not sign up before the expiration date, you must request a new code.     Your unique [employer] Access Code: [email or web address]  Expires: 3/24/2017 12:04 PM    If you have questions, you can c Most common side effects: Stomach upset   What to report to your healthcare team: Stomach upset, unresolved pain           GI Medications     Loperamide HCl (IMODIUM A-D) 2 MG Oral Tab       Use:  Nausea/vomiting, acid reflux, low bowel motility, stomach p Yes - the patient is able to be screened

## 2024-05-24 ENCOUNTER — ORDER TRANSCRIPTION (OUTPATIENT)
Dept: CARDIAC REHAB | Facility: HOSPITAL | Age: 69
End: 2024-05-24

## 2024-05-24 DIAGNOSIS — I20.89 STABLE ANGINA (HCC): Primary | ICD-10-CM

## 2024-05-30 ENCOUNTER — CARDPULM VISIT (OUTPATIENT)
Dept: CARDIAC REHAB | Facility: HOSPITAL | Age: 69
End: 2024-05-30
Attending: INTERNAL MEDICINE

## 2024-06-12 ENCOUNTER — ORDER TRANSCRIPTION (OUTPATIENT)
Dept: CARDIAC REHAB | Facility: HOSPITAL | Age: 69
End: 2024-06-12

## 2024-06-12 DIAGNOSIS — I50.22: Primary | ICD-10-CM

## 2024-06-18 ENCOUNTER — CARDPULM VISIT (OUTPATIENT)
Dept: CARDIAC REHAB | Facility: HOSPITAL | Age: 69
End: 2024-06-18
Attending: INTERNAL MEDICINE

## 2024-06-18 PROCEDURE — 93798 PHYS/QHP OP CAR RHAB W/ECG: CPT

## 2024-06-20 ENCOUNTER — CARDPULM VISIT (OUTPATIENT)
Dept: CARDIAC REHAB | Facility: HOSPITAL | Age: 69
End: 2024-06-20
Attending: INTERNAL MEDICINE

## 2024-06-20 PROCEDURE — 93798 PHYS/QHP OP CAR RHAB W/ECG: CPT

## 2024-06-25 ENCOUNTER — CARDPULM VISIT (OUTPATIENT)
Dept: CARDIAC REHAB | Facility: HOSPITAL | Age: 69
End: 2024-06-25
Attending: INTERNAL MEDICINE

## 2024-06-25 PROCEDURE — 93798 PHYS/QHP OP CAR RHAB W/ECG: CPT

## 2024-06-27 ENCOUNTER — CARDPULM VISIT (OUTPATIENT)
Dept: CARDIAC REHAB | Facility: HOSPITAL | Age: 69
End: 2024-06-27
Attending: INTERNAL MEDICINE

## 2024-06-27 PROCEDURE — 93798 PHYS/QHP OP CAR RHAB W/ECG: CPT

## 2024-07-02 ENCOUNTER — CARDPULM VISIT (OUTPATIENT)
Dept: CARDIAC REHAB | Facility: HOSPITAL | Age: 69
End: 2024-07-02
Attending: INTERNAL MEDICINE
Payer: MEDICARE

## 2024-07-02 PROCEDURE — 93798 PHYS/QHP OP CAR RHAB W/ECG: CPT

## 2024-07-04 ENCOUNTER — APPOINTMENT (OUTPATIENT)
Dept: CARDIAC REHAB | Facility: HOSPITAL | Age: 69
End: 2024-07-04
Attending: INTERNAL MEDICINE
Payer: MEDICARE

## 2024-07-09 ENCOUNTER — APPOINTMENT (OUTPATIENT)
Dept: CARDIAC REHAB | Facility: HOSPITAL | Age: 69
End: 2024-07-09
Attending: INTERNAL MEDICINE
Payer: MEDICARE

## 2024-07-11 ENCOUNTER — CARDPULM VISIT (OUTPATIENT)
Dept: CARDIAC REHAB | Facility: HOSPITAL | Age: 69
End: 2024-07-11
Attending: INTERNAL MEDICINE
Payer: MEDICARE

## 2024-07-11 PROCEDURE — 93798 PHYS/QHP OP CAR RHAB W/ECG: CPT

## 2024-07-16 ENCOUNTER — CARDPULM VISIT (OUTPATIENT)
Dept: CARDIAC REHAB | Facility: HOSPITAL | Age: 69
End: 2024-07-16
Attending: INTERNAL MEDICINE
Payer: MEDICARE

## 2024-07-16 PROCEDURE — 93798 PHYS/QHP OP CAR RHAB W/ECG: CPT

## 2024-07-18 ENCOUNTER — CARDPULM VISIT (OUTPATIENT)
Dept: CARDIAC REHAB | Facility: HOSPITAL | Age: 69
End: 2024-07-18
Attending: INTERNAL MEDICINE
Payer: MEDICARE

## 2024-07-18 PROCEDURE — 93798 PHYS/QHP OP CAR RHAB W/ECG: CPT

## 2024-07-23 ENCOUNTER — CARDPULM VISIT (OUTPATIENT)
Dept: CARDIAC REHAB | Facility: HOSPITAL | Age: 69
End: 2024-07-23
Attending: INTERNAL MEDICINE
Payer: MEDICARE

## 2024-07-23 PROCEDURE — 93798 PHYS/QHP OP CAR RHAB W/ECG: CPT

## 2024-07-25 ENCOUNTER — APPOINTMENT (OUTPATIENT)
Dept: CARDIAC REHAB | Facility: HOSPITAL | Age: 69
End: 2024-07-25
Attending: INTERNAL MEDICINE
Payer: MEDICARE

## 2024-07-30 ENCOUNTER — CARDPULM VISIT (OUTPATIENT)
Dept: CARDIAC REHAB | Facility: HOSPITAL | Age: 69
End: 2024-07-30
Attending: INTERNAL MEDICINE
Payer: MEDICARE

## 2024-07-30 PROCEDURE — 93798 PHYS/QHP OP CAR RHAB W/ECG: CPT

## 2024-08-01 ENCOUNTER — CARDPULM VISIT (OUTPATIENT)
Dept: CARDIAC REHAB | Facility: HOSPITAL | Age: 69
End: 2024-08-01
Attending: INTERNAL MEDICINE
Payer: MEDICARE

## 2024-08-01 PROCEDURE — 93798 PHYS/QHP OP CAR RHAB W/ECG: CPT

## 2024-08-06 ENCOUNTER — APPOINTMENT (OUTPATIENT)
Dept: CARDIAC REHAB | Facility: HOSPITAL | Age: 69
End: 2024-08-06
Attending: INTERNAL MEDICINE
Payer: MEDICARE

## 2024-08-08 ENCOUNTER — APPOINTMENT (OUTPATIENT)
Dept: CARDIAC REHAB | Facility: HOSPITAL | Age: 69
End: 2024-08-08
Attending: INTERNAL MEDICINE
Payer: MEDICARE

## 2024-08-13 ENCOUNTER — CARDPULM VISIT (OUTPATIENT)
Dept: CARDIAC REHAB | Facility: HOSPITAL | Age: 69
End: 2024-08-13
Attending: INTERNAL MEDICINE
Payer: MEDICARE

## 2024-08-13 PROCEDURE — 93798 PHYS/QHP OP CAR RHAB W/ECG: CPT

## 2024-08-15 ENCOUNTER — CARDPULM VISIT (OUTPATIENT)
Dept: CARDIAC REHAB | Facility: HOSPITAL | Age: 69
End: 2024-08-15
Attending: INTERNAL MEDICINE
Payer: MEDICARE

## 2024-08-15 PROCEDURE — 93798 PHYS/QHP OP CAR RHAB W/ECG: CPT

## 2024-08-20 ENCOUNTER — CARDPULM VISIT (OUTPATIENT)
Dept: CARDIAC REHAB | Facility: HOSPITAL | Age: 69
End: 2024-08-20
Attending: INTERNAL MEDICINE
Payer: MEDICARE

## 2024-08-20 PROCEDURE — 93798 PHYS/QHP OP CAR RHAB W/ECG: CPT

## 2024-08-22 ENCOUNTER — CARDPULM VISIT (OUTPATIENT)
Dept: CARDIAC REHAB | Facility: HOSPITAL | Age: 69
End: 2024-08-22
Attending: INTERNAL MEDICINE
Payer: MEDICARE

## 2024-08-22 PROCEDURE — 93798 PHYS/QHP OP CAR RHAB W/ECG: CPT

## 2024-08-27 ENCOUNTER — CARDPULM VISIT (OUTPATIENT)
Dept: CARDIAC REHAB | Facility: HOSPITAL | Age: 69
End: 2024-08-27
Attending: INTERNAL MEDICINE
Payer: MEDICARE

## 2024-08-27 PROCEDURE — 93798 PHYS/QHP OP CAR RHAB W/ECG: CPT

## 2024-08-29 ENCOUNTER — CARDPULM VISIT (OUTPATIENT)
Dept: CARDIAC REHAB | Facility: HOSPITAL | Age: 69
End: 2024-08-29
Attending: INTERNAL MEDICINE
Payer: MEDICARE

## 2024-08-29 PROCEDURE — 93798 PHYS/QHP OP CAR RHAB W/ECG: CPT

## 2024-09-03 ENCOUNTER — CARDPULM VISIT (OUTPATIENT)
Dept: CARDIAC REHAB | Facility: HOSPITAL | Age: 69
End: 2024-09-03
Attending: INTERNAL MEDICINE
Payer: MEDICARE

## 2024-09-03 PROCEDURE — 93798 PHYS/QHP OP CAR RHAB W/ECG: CPT

## 2024-09-05 ENCOUNTER — CARDPULM VISIT (OUTPATIENT)
Dept: CARDIAC REHAB | Facility: HOSPITAL | Age: 69
End: 2024-09-05
Attending: INTERNAL MEDICINE
Payer: MEDICARE

## 2024-09-05 PROCEDURE — 93798 PHYS/QHP OP CAR RHAB W/ECG: CPT

## 2024-09-10 ENCOUNTER — CARDPULM VISIT (OUTPATIENT)
Dept: CARDIAC REHAB | Facility: HOSPITAL | Age: 69
End: 2024-09-10
Attending: INTERNAL MEDICINE
Payer: MEDICARE

## 2024-09-10 PROCEDURE — 93798 PHYS/QHP OP CAR RHAB W/ECG: CPT

## 2024-09-12 ENCOUNTER — CARDPULM VISIT (OUTPATIENT)
Dept: CARDIAC REHAB | Facility: HOSPITAL | Age: 69
End: 2024-09-12
Attending: INTERNAL MEDICINE
Payer: MEDICARE

## 2024-09-12 PROCEDURE — 93798 PHYS/QHP OP CAR RHAB W/ECG: CPT

## 2024-09-17 ENCOUNTER — CARDPULM VISIT (OUTPATIENT)
Dept: CARDIAC REHAB | Facility: HOSPITAL | Age: 69
End: 2024-09-17
Attending: INTERNAL MEDICINE
Payer: MEDICARE

## 2024-09-17 PROCEDURE — 93798 PHYS/QHP OP CAR RHAB W/ECG: CPT

## 2024-09-19 ENCOUNTER — APPOINTMENT (OUTPATIENT)
Dept: CARDIAC REHAB | Facility: HOSPITAL | Age: 69
End: 2024-09-19
Attending: INTERNAL MEDICINE
Payer: MEDICARE

## 2024-09-24 ENCOUNTER — APPOINTMENT (OUTPATIENT)
Dept: CARDIAC REHAB | Facility: HOSPITAL | Age: 69
End: 2024-09-24
Attending: INTERNAL MEDICINE
Payer: MEDICARE

## 2024-09-26 ENCOUNTER — APPOINTMENT (OUTPATIENT)
Dept: CARDIAC REHAB | Facility: HOSPITAL | Age: 69
End: 2024-09-26
Attending: INTERNAL MEDICINE
Payer: MEDICARE

## 2024-10-01 ENCOUNTER — APPOINTMENT (OUTPATIENT)
Dept: CARDIAC REHAB | Facility: HOSPITAL | Age: 69
End: 2024-10-01
Attending: INTERNAL MEDICINE
Payer: MEDICARE

## 2024-10-03 ENCOUNTER — CARDPULM VISIT (OUTPATIENT)
Dept: CARDIAC REHAB | Facility: HOSPITAL | Age: 69
End: 2024-10-03
Attending: INTERNAL MEDICINE
Payer: MEDICARE

## 2024-10-03 PROCEDURE — 93798 PHYS/QHP OP CAR RHAB W/ECG: CPT

## 2024-10-08 ENCOUNTER — CARDPULM VISIT (OUTPATIENT)
Dept: CARDIAC REHAB | Facility: HOSPITAL | Age: 69
End: 2024-10-08
Attending: INTERNAL MEDICINE
Payer: MEDICARE

## 2024-10-08 PROCEDURE — 93798 PHYS/QHP OP CAR RHAB W/ECG: CPT

## 2024-10-10 ENCOUNTER — CARDPULM VISIT (OUTPATIENT)
Dept: CARDIAC REHAB | Facility: HOSPITAL | Age: 69
End: 2024-10-10
Attending: INTERNAL MEDICINE
Payer: MEDICARE

## 2024-10-10 PROCEDURE — 93798 PHYS/QHP OP CAR RHAB W/ECG: CPT

## 2024-10-15 ENCOUNTER — CARDPULM VISIT (OUTPATIENT)
Dept: CARDIAC REHAB | Facility: HOSPITAL | Age: 69
End: 2024-10-15
Attending: INTERNAL MEDICINE
Payer: MEDICARE

## 2024-10-15 PROCEDURE — 93798 PHYS/QHP OP CAR RHAB W/ECG: CPT

## 2024-10-17 ENCOUNTER — CARDPULM VISIT (OUTPATIENT)
Dept: CARDIAC REHAB | Facility: HOSPITAL | Age: 69
End: 2024-10-17
Attending: INTERNAL MEDICINE
Payer: MEDICARE

## 2024-10-17 PROCEDURE — 93798 PHYS/QHP OP CAR RHAB W/ECG: CPT

## 2024-10-22 ENCOUNTER — CARDPULM VISIT (OUTPATIENT)
Dept: CARDIAC REHAB | Facility: HOSPITAL | Age: 69
End: 2024-10-22
Attending: INTERNAL MEDICINE
Payer: MEDICARE

## 2024-10-22 PROCEDURE — 93798 PHYS/QHP OP CAR RHAB W/ECG: CPT

## 2024-10-24 ENCOUNTER — CARDPULM VISIT (OUTPATIENT)
Dept: CARDIAC REHAB | Facility: HOSPITAL | Age: 69
End: 2024-10-24
Attending: INTERNAL MEDICINE
Payer: MEDICARE

## 2024-10-24 PROCEDURE — 93798 PHYS/QHP OP CAR RHAB W/ECG: CPT

## 2024-10-29 ENCOUNTER — CARDPULM VISIT (OUTPATIENT)
Dept: CARDIAC REHAB | Facility: HOSPITAL | Age: 69
End: 2024-10-29
Attending: INTERNAL MEDICINE
Payer: MEDICARE

## 2024-10-29 PROCEDURE — 93798 PHYS/QHP OP CAR RHAB W/ECG: CPT

## 2024-10-31 ENCOUNTER — CARDPULM VISIT (OUTPATIENT)
Dept: CARDIAC REHAB | Facility: HOSPITAL | Age: 69
End: 2024-10-31
Attending: INTERNAL MEDICINE
Payer: MEDICARE

## 2024-10-31 PROCEDURE — 93798 PHYS/QHP OP CAR RHAB W/ECG: CPT

## 2024-11-05 ENCOUNTER — CARDPULM VISIT (OUTPATIENT)
Dept: CARDIAC REHAB | Facility: HOSPITAL | Age: 69
End: 2024-11-05
Attending: INTERNAL MEDICINE
Payer: MEDICARE

## 2024-11-05 PROCEDURE — 93798 PHYS/QHP OP CAR RHAB W/ECG: CPT

## 2024-11-07 ENCOUNTER — CARDPULM VISIT (OUTPATIENT)
Dept: CARDIAC REHAB | Facility: HOSPITAL | Age: 69
End: 2024-11-07
Attending: INTERNAL MEDICINE
Payer: MEDICARE

## 2024-11-07 PROCEDURE — 93798 PHYS/QHP OP CAR RHAB W/ECG: CPT

## 2025-01-08 ENCOUNTER — ORDER TRANSCRIPTION (OUTPATIENT)
Dept: CARDIAC REHAB | Facility: HOSPITAL | Age: 70
End: 2025-01-08

## 2025-01-08 DIAGNOSIS — Z98.890 S/P MITRAL VALVE CLIP IMPLANTATION: Primary | ICD-10-CM

## 2025-01-08 DIAGNOSIS — Z95.818 S/P MITRAL VALVE CLIP IMPLANTATION: Primary | ICD-10-CM

## 2025-01-09 ENCOUNTER — CARDPULM VISIT (OUTPATIENT)
Dept: CARDIAC REHAB | Facility: HOSPITAL | Age: 70
End: 2025-01-09
Attending: INTERNAL MEDICINE
Payer: MEDICARE

## 2025-01-09 PROCEDURE — 93798 PHYS/QHP OP CAR RHAB W/ECG: CPT

## 2025-01-14 ENCOUNTER — CARDPULM VISIT (OUTPATIENT)
Dept: CARDIAC REHAB | Facility: HOSPITAL | Age: 70
End: 2025-01-14
Attending: INTERNAL MEDICINE
Payer: MEDICARE

## 2025-01-14 PROCEDURE — 93798 PHYS/QHP OP CAR RHAB W/ECG: CPT

## 2025-01-16 ENCOUNTER — CARDPULM VISIT (OUTPATIENT)
Dept: CARDIAC REHAB | Facility: HOSPITAL | Age: 70
End: 2025-01-16
Attending: INTERNAL MEDICINE
Payer: MEDICARE

## 2025-01-16 PROCEDURE — 93798 PHYS/QHP OP CAR RHAB W/ECG: CPT

## 2025-01-21 ENCOUNTER — APPOINTMENT (OUTPATIENT)
Dept: CARDIAC REHAB | Facility: HOSPITAL | Age: 70
End: 2025-01-21
Attending: INTERNAL MEDICINE
Payer: MEDICARE

## 2025-01-23 ENCOUNTER — APPOINTMENT (OUTPATIENT)
Dept: CARDIAC REHAB | Facility: HOSPITAL | Age: 70
End: 2025-01-23
Attending: INTERNAL MEDICINE
Payer: MEDICARE

## 2025-02-18 ENCOUNTER — APPOINTMENT (OUTPATIENT)
Dept: CARDIAC REHAB | Facility: HOSPITAL | Age: 70
End: 2025-02-18
Attending: INTERNAL MEDICINE
Payer: MEDICARE

## 2025-02-24 ENCOUNTER — CARDPULM VISIT (OUTPATIENT)
Dept: CARDIAC REHAB | Facility: HOSPITAL | Age: 70
End: 2025-02-24
Attending: INTERNAL MEDICINE
Payer: MEDICARE

## 2025-02-24 PROCEDURE — 93798 PHYS/QHP OP CAR RHAB W/ECG: CPT

## 2025-02-25 ENCOUNTER — APPOINTMENT (OUTPATIENT)
Dept: CARDIAC REHAB | Facility: HOSPITAL | Age: 70
End: 2025-02-25
Attending: INTERNAL MEDICINE
Payer: MEDICARE

## 2025-02-26 ENCOUNTER — CARDPULM VISIT (OUTPATIENT)
Dept: CARDIAC REHAB | Facility: HOSPITAL | Age: 70
End: 2025-02-26
Attending: INTERNAL MEDICINE
Payer: MEDICARE

## 2025-02-26 PROCEDURE — 93798 PHYS/QHP OP CAR RHAB W/ECG: CPT

## 2025-02-27 ENCOUNTER — APPOINTMENT (OUTPATIENT)
Dept: CARDIAC REHAB | Facility: HOSPITAL | Age: 70
End: 2025-02-27
Attending: INTERNAL MEDICINE
Payer: MEDICARE

## 2025-03-04 ENCOUNTER — CARDPULM VISIT (OUTPATIENT)
Dept: CARDIAC REHAB | Facility: HOSPITAL | Age: 70
End: 2025-03-04
Attending: INTERNAL MEDICINE
Payer: MEDICARE

## 2025-03-04 PROCEDURE — 93798 PHYS/QHP OP CAR RHAB W/ECG: CPT

## 2025-03-06 ENCOUNTER — CARDPULM VISIT (OUTPATIENT)
Dept: CARDIAC REHAB | Facility: HOSPITAL | Age: 70
End: 2025-03-06
Attending: INTERNAL MEDICINE
Payer: MEDICARE

## 2025-03-06 PROCEDURE — 93798 PHYS/QHP OP CAR RHAB W/ECG: CPT

## 2025-03-11 ENCOUNTER — CARDPULM VISIT (OUTPATIENT)
Dept: CARDIAC REHAB | Facility: HOSPITAL | Age: 70
End: 2025-03-11
Attending: INTERNAL MEDICINE
Payer: MEDICARE

## 2025-03-11 PROCEDURE — 93798 PHYS/QHP OP CAR RHAB W/ECG: CPT

## 2025-03-13 ENCOUNTER — CARDPULM VISIT (OUTPATIENT)
Dept: CARDIAC REHAB | Facility: HOSPITAL | Age: 70
End: 2025-03-13
Attending: INTERNAL MEDICINE
Payer: MEDICARE

## 2025-03-13 PROCEDURE — 93798 PHYS/QHP OP CAR RHAB W/ECG: CPT

## 2025-03-17 ENCOUNTER — APPOINTMENT (OUTPATIENT)
Dept: CT IMAGING | Facility: HOSPITAL | Age: 70
End: 2025-03-17
Attending: EMERGENCY MEDICINE
Payer: MEDICARE

## 2025-03-17 ENCOUNTER — HOSPITAL ENCOUNTER (EMERGENCY)
Facility: HOSPITAL | Age: 70
Discharge: HOME | End: 2025-03-17
Attending: EMERGENCY MEDICINE
Payer: MEDICARE

## 2025-03-17 VITALS
OXYGEN SATURATION: 99 % | SYSTOLIC BLOOD PRESSURE: 145 MMHG | BODY MASS INDEX: 20.96 KG/M2 | WEIGHT: 146.38 LBS | HEART RATE: 86 BPM | DIASTOLIC BLOOD PRESSURE: 97 MMHG | TEMPERATURE: 98 F | RESPIRATION RATE: 18 BRPM | HEIGHT: 70 IN

## 2025-03-17 DIAGNOSIS — N28.9 ACUTE ON CHRONIC RENAL INSUFFICIENCY: ICD-10-CM

## 2025-03-17 DIAGNOSIS — K59.00 CONSTIPATION, UNSPECIFIED CONSTIPATION TYPE: ICD-10-CM

## 2025-03-17 DIAGNOSIS — N18.9 ACUTE ON CHRONIC RENAL INSUFFICIENCY: ICD-10-CM

## 2025-03-17 DIAGNOSIS — R33.9 URINARY RETENTION: Primary | ICD-10-CM

## 2025-03-17 LAB
ANION GAP SERPL CALC-SCNC: 10 MMOL/L (ref 0–18)
BASOPHILS # BLD AUTO: 0.03 X10(3) UL (ref 0–0.2)
BASOPHILS NFR BLD AUTO: 0.2 %
BILIRUB UR QL: NEGATIVE
BUN BLD-MCNC: 91 MG/DL (ref 9–23)
BUN/CREAT SERPL: 21.9 (ref 10–20)
CALCIUM BLD-MCNC: 8.7 MG/DL (ref 8.7–10.4)
CHLORIDE SERPL-SCNC: 107 MMOL/L (ref 98–112)
CLARITY UR: CLEAR
CO2 SERPL-SCNC: 23 MMOL/L (ref 21–32)
CREAT BLD-MCNC: 4.15 MG/DL
DEPRECATED RDW RBC AUTO: 51.4 FL (ref 35.1–46.3)
EGFRCR SERPLBLD CKD-EPI 2021: 15 ML/MIN/1.73M2 (ref 60–?)
EOSINOPHIL # BLD AUTO: 0.07 X10(3) UL (ref 0–0.7)
EOSINOPHIL NFR BLD AUTO: 0.5 %
ERYTHROCYTE [DISTWIDTH] IN BLOOD BY AUTOMATED COUNT: 16.1 % (ref 11–15)
GLUCOSE BLD-MCNC: 192 MG/DL (ref 70–99)
GLUCOSE UR-MCNC: NORMAL MG/DL
HCT VFR BLD AUTO: 34.3 %
HGB BLD-MCNC: 11.2 G/DL
HGB UR QL STRIP.AUTO: NEGATIVE
IMM GRANULOCYTES # BLD AUTO: 0.04 X10(3) UL (ref 0–1)
IMM GRANULOCYTES NFR BLD: 0.3 %
KETONES UR-MCNC: NEGATIVE MG/DL
LEUKOCYTE ESTERASE UR QL STRIP.AUTO: NEGATIVE
LYMPHOCYTES # BLD AUTO: 0.44 X10(3) UL (ref 1–4)
LYMPHOCYTES NFR BLD AUTO: 3.4 %
MCH RBC QN AUTO: 28.6 PG (ref 26–34)
MCHC RBC AUTO-ENTMCNC: 32.7 G/DL (ref 31–37)
MCV RBC AUTO: 87.7 FL
MONOCYTES # BLD AUTO: 0.76 X10(3) UL (ref 0.1–1)
MONOCYTES NFR BLD AUTO: 5.9 %
NEUTROPHILS # BLD AUTO: 11.47 X10 (3) UL (ref 1.5–7.7)
NEUTROPHILS # BLD AUTO: 11.47 X10(3) UL (ref 1.5–7.7)
NEUTROPHILS NFR BLD AUTO: 89.7 %
NITRITE UR QL STRIP.AUTO: NEGATIVE
OSMOLALITY SERPL CALC.SUM OF ELEC: 323 MOSM/KG (ref 275–295)
PH UR: 5.5 [PH] (ref 5–8)
PLATELET # BLD AUTO: 122 10(3)UL (ref 150–450)
POTASSIUM SERPL-SCNC: 3.7 MMOL/L (ref 3.5–5.1)
PROT UR-MCNC: 50 MG/DL
RBC # BLD AUTO: 3.91 X10(6)UL
SODIUM SERPL-SCNC: 140 MMOL/L (ref 136–145)
SP GR UR STRIP: 1.01 (ref 1–1.03)
UROBILINOGEN UR STRIP-ACNC: NORMAL
WBC # BLD AUTO: 12.8 X10(3) UL (ref 4–11)

## 2025-03-17 PROCEDURE — 85025 COMPLETE CBC W/AUTO DIFF WBC: CPT | Performed by: EMERGENCY MEDICINE

## 2025-03-17 PROCEDURE — 96360 HYDRATION IV INFUSION INIT: CPT

## 2025-03-17 PROCEDURE — 99284 EMERGENCY DEPT VISIT MOD MDM: CPT

## 2025-03-17 PROCEDURE — 80048 BASIC METABOLIC PNL TOTAL CA: CPT | Performed by: EMERGENCY MEDICINE

## 2025-03-17 PROCEDURE — 51798 US URINE CAPACITY MEASURE: CPT

## 2025-03-17 PROCEDURE — 51702 INSERT TEMP BLADDER CATH: CPT

## 2025-03-17 PROCEDURE — 74176 CT ABD & PELVIS W/O CONTRAST: CPT | Performed by: EMERGENCY MEDICINE

## 2025-03-17 PROCEDURE — 81001 URINALYSIS AUTO W/SCOPE: CPT | Performed by: EMERGENCY MEDICINE

## 2025-03-17 RX ORDER — TAMSULOSIN HYDROCHLORIDE 0.4 MG/1
0.4 CAPSULE ORAL DAILY
Qty: 7 CAPSULE | Refills: 0 | Status: SHIPPED | OUTPATIENT
Start: 2025-03-17 | End: 2025-03-24

## 2025-03-17 NOTE — ED PROVIDER NOTES
Reviewed results of CT.  Miralax for constipation.  Patient declines admission he is aware needs repeat bmp in few days and urology follow up will add flomax.

## 2025-03-17 NOTE — ED INITIAL ASSESSMENT (HPI)
70y M to ED via personal car with c/o rectal pain/pressure and urinary retention. Patient reports being unable to void x 4 hours. No urinary issues prior to 4 hrs ago. Prior to urinary retention, patient developed rectal pressure and pain. Feels like he needs to have bowel movement and urinate and can do neither. Last BM yesterday was normal, soft and formed. History of bowel obstructions.     Patient's home medications have not been reviewed in system x 2 years. Patient reports his medications are very different, but he does not have a list with him.

## 2025-03-17 NOTE — ED PROVIDER NOTES
Patient Seen in: [facility] Emergency Department      History     Chief Complaint   Patient presents with    Urinary Retention    Rectal Pain     Stated Complaint: urinary problems    Subjective:   HPI      70 year old male with multiple medical issues including h/o colon ca and multiple abd surgeries, class 3 CHF, MV regurgitation, h/o bowel obstruction, HTN, CKD who presents with rectal pain and inability to pass urine for the past 4 hours. Pt had bladder scan and hernandez placed in triage with 800cc+ urine output. Pt also concerned about rectal pain, that there is no bowel issue, but does state this feels different than typical bowel obstruction for him, not same pain and no vomiting.     Objective:     Past Medical History:    Bilateral cataracts    bilateral cataract surgery    Bowel obstruction (HCC)    Cataract    Colon cancer (HCC)    Essential hypertension    H/O bone marrow transplant (HCC)    High blood pressure    History of thyroid cancer    Non Hodgkin's lymphoma (HCC)    Pancreatitis (HCC)    Pneumothorax    partial pneumothorax left lung and minimal on right side              Past Surgical History:   Procedure Laterality Date    Appendectomy      Appendectomy      Cataract Bilateral     Cholecystectomy      Thyroidectomy      Tonsillectomy      Vasectomy                  Social History     Socioeconomic History    Marital status:    Tobacco Use    Smoking status: Never    Smokeless tobacco: Never   Vaping Use    Vaping status: Never Used   Substance and Sexual Activity    Alcohol use: Yes     Alcohol/week: 0.0 standard drinks of alcohol     Comment: socially    Drug use: Never   Other Topics Concern    Caffeine Concern Yes     Comment: coffee, 1 cup per day    Pt has a pacemaker No    Reaction to local anesthetic No     Social Drivers of Health     Food Insecurity: Low Risk  (11/13/2024)    Received from [facility]    Food Insecurity     Have there been times that  your food ran out, and you didn't have money to get more?: No     Are there times that you worry that this might happen?: No   Transportation Needs: Low Risk  (11/13/2024)    Received from [facility]    Transportation Needs     Do you have trouble getting transportation to medical appointments?: No   Housing Stability: Low Risk  (11/13/2024)    Received from [facility]    Housing Stability     Are you worried that your electric, gas, oil, or water might be shut off?: No     Are you concerned about having a safe and reliable place to live?: No                  Physical Exam     ED Triage Vitals [03/17/25 0413]   BP (!) 157/92   Pulse 87   Resp 18   Temp 97.5 °F (36.4 °C)   Temp src Temporal   SpO2 99 %   O2 Device None (Room air)       Current Vitals:   Vital Signs  BP: 137/82  Pulse: 83  Resp: 18  Temp: 97.5 °F (36.4 °C)  Temp src: Temporal  MAP (mmHg): 100    Oxygen Therapy  SpO2: 98 %  O2 Device: None (Room air)        Physical Exam  Vitals and nursing note reviewed.   Constitutional:       General: He is not in acute distress.     Appearance: Normal appearance. He is well-developed. He is not ill-appearing, toxic-appearing or diaphoretic.   HENT:      Head: Normocephalic and atraumatic.   Eyes:      Conjunctiva/sclera: Conjunctivae normal.      Pupils: Pupils are equal, round, and reactive to light.   Cardiovascular:      Rate and Rhythm: Normal rate and regular rhythm.      Pulses: Normal pulses.      Heart sounds: Normal heart sounds. No murmur heard.  Pulmonary:      Effort: Pulmonary effort is normal. No respiratory distress.      Breath sounds: Normal breath sounds. No wheezing.   Abdominal:      General: There is no distension.      Palpations: Abdomen is soft.      Tenderness: There is no abdominal tenderness. There is no guarding.   Genitourinary:     Comments: Catheter in place draining clear yellow urine  Musculoskeletal:         General: No tenderness. Normal  range of motion.      Cervical back: Full passive range of motion without pain, normal range of motion and neck supple. No rigidity. Normal range of motion.      Right lower leg: No edema.      Left lower leg: No edema.   Skin:     General: Skin is warm and dry.      Findings: No rash.   Neurological:      Mental Status: He is alert and oriented to person, place, and time.      GCS: GCS eye subscore is 4. GCS verbal subscore is 5. GCS motor subscore is 6.      Sensory: Sensation is intact. No sensory deficit.      Motor: Motor function is intact. No weakness.   Psychiatric:         Attention and Perception: Attention normal.         Mood and Affect: Mood normal.         Behavior: Behavior normal. Behavior is cooperative.             ED Course     Labs Reviewed   BASIC METABOLIC PANEL (8) - Abnormal; Notable for the following components:       Result Value    Glucose 192 (*)     BUN 91 (*)     Creatinine 4.15 (*)     BUN/CREA Ratio 21.9 (*)     Calculated Osmolality 323 (*)     eGFR-Cr 15 (*)     All other components within normal limits   CBC WITH DIFFERENTIAL WITH PLATELET - Abnormal; Notable for the following components:    WBC 12.8 (*)     HGB 11.2 (*)     HCT 34.3 (*)     RDW-SD 51.4 (*)     RDW 16.1 (*)     .0 (*)     Neutrophil Absolute Prelim 11.47 (*)     Neutrophil Absolute 11.47 (*)     Lymphocyte Absolute 0.44 (*)     All other components within normal limits   URINALYSIS WITH CULTURE REFLEX - Abnormal; Notable for the following components:    Protein Urine 50 (*)     All other components within normal limits                   MDM      Pulse Ox: 99%, Normal, RA    Cardiac Monitor:   Pulse Readings from Last 1 Encounters:   03/17/25 86   , sinus, normal for rate and rhythm     Prior notes reviewed: yes - progress notes from office visit 1/14/25 reviewed to obtain PMH      Radiology findings: CT Abd/Pel - pending at time of signout.     Chronic Medical Conditions Potentially Contributing: CKD, CHF,  h/o abdominal surgeries and SBO      Medications   sodium chloride 0.9 % IV bolus 500 mL (0 mL Intravenous Stopped 3/17/25 4494)       Pt with urinary retention, relieved by catheter placement. Pt with acute on chronic renal insufficiency. Discussed admission with pt as would need to be monitored during rehydration in setting of CHF. Discussed dangers of worsening renal insufficiency if not properly monitored/treated.   Awaiting CT Abd/Pel to be sure no other obstruction. Pt states he would like to wait until CT results, but is leaning toward not staying and just following up closely with his PMD. He states understanding of reasons to return and need for f/u on creat.    Pt signed out to Dr Mcghee    Medical Decision Making      Disposition and Plan     Clinical Impression:  1. Urinary retention    2. Acute on chronic renal insufficiency         Disposition:  There is no disposition on file for this visit.  There is no disposition time on file for this visit.    Follow-up:  Your primary care doctor    Schedule an appointment as soon as possible for a visit  Call for next available appointment to be seen this week and to have your creatinine rechecked    Your urologist at [facility]    Schedule an appointment as soon as possible for a visit  Call for next available appointment to be seen this week    We recommend that you schedule follow up care with a primary care provider within the next three months to obtain basic health screening including reassessment of your blood pressure.      Medications Prescribed:  Current Discharge Medication List              Supplementary Documentation:

## 2025-03-19 NOTE — PROGRESS NOTES
[facility]  Initial Office Consultation    HPI:   Carlos Dick is a 70 year old male with PMHx of cataracts, bowel obstruction, colon cancer, ESRD, DM, HTN, bone marrow transplant, thyroid cancer, non Hodgkin's lymphoma, CAD, HFrEF (20-30%),pancreatitis, and pneumothorax here today for urinary retention.     The patient presented to the ER on 03/17 with c/o inability to void x 4 hours and rectal pain/pressure. A bladder scan was done that showed 500 mL, however they stated that they had to clamp the hernandez after 800 mL of output to prevent bladder spasms. His CT showed a large volume of fecal material throughout the colon suggesting constipation, as well as a focus of fecal material within the rectum suggesting impaction. His UA did not show evidence of blood or infection. Cr 4.15, chronically elevated however this is slightly higher than baseline (2.96). The patient is following with a nephrologist.    The patient denies urinary symptoms prior to this episode. His hernandez is currently draining clear, yellow urine. Given Tamsulosin 0.4 mg every day at his ER visit but did not take it. He feels as though his bowel movements are more regular since his ER visit, bordering on loose.    He has an appointment on 03/28/25 with [facility] to establish care there.    Past Medical History:    Bilateral cataracts    bilateral cataract surgery    Bowel obstruction (HCC)    Cataract    Colon cancer (HCC)    Essential hypertension    H/O bone marrow transplant (HCC)    High blood pressure    History of thyroid cancer    Non Hodgkin's lymphoma (HCC)    Pancreatitis (HCC)    Pneumothorax    partial pneumothorax left lung and minimal on right side     Past Surgical History:   Procedure Laterality Date    Appendectomy      Appendectomy      Cataract Bilateral     Cholecystectomy      Thyroidectomy      Tonsillectomy      Vasectomy       No family history on file.  Social History     Socioeconomic History     Marital status:    Tobacco Use    Smoking status: Never    Smokeless tobacco: Never   Vaping Use    Vaping status: Never Used   Substance and Sexual Activity    Alcohol use: Yes     Alcohol/week: 0.0 standard drinks of alcohol     Comment: socially    Drug use: Never   Other Topics Concern    Caffeine Concern Yes     Comment: coffee, 1 cup per day    Pt has a pacemaker No    Reaction to local anesthetic No     Social Drivers of Health     Food Insecurity: Low Risk  (11/13/2024)    Received from [facility]    Food Insecurity     Have there been times that your food ran out, and you didn't have money to get more?: No     Are there times that you worry that this might happen?: No   Transportation Needs: Low Risk  (11/13/2024)    Received from [facility]    Transportation Needs     Do you have trouble getting transportation to medical appointments?: No   Housing Stability: Low Risk  (11/13/2024)    Received from [facility]    Housing Stability     Are you worried that your electric, gas, oil, or water might be shut off?: No     Are you concerned about having a safe and reliable place to live?: No     Current Outpatient Medications   Medication Sig Dispense Refill    tamsulosin (FLOMAX) 0.4 MG Oral Cap Take 1 capsule (0.4 mg total) by mouth daily for 7 days. 7 capsule 0    COPPER OR Take by mouth.      SODIUM BICARBONATE OR Take by mouth.      Cyanocobalamin (VITAMIN B-12 OR) Take by mouth.      benzonatate 100 MG Oral Cap Take 1 capsule (100 mg total) by mouth 3 (three) times daily as needed for cough. 20 capsule 0    acetaminophen 325 MG Oral Tab Take 2 tablets (650 mg total) by mouth every 6 (six) hours as needed. 1 tablet 0    ondansetron 4 MG Oral Tablet Dispersible Take 1 tablet (4 mg total) by mouth every 8 (eight) hours as needed for Nausea. 10 tablet 0    Cholecalciferol (VITAMIN D) 1000 units Oral Tab Take 1,000 Units by mouth daily       Rosuvastatin Calcium 5 MG Oral Tab Take 1 tablet (5 mg total) by mouth nightly.      Levothyroxine Sodium 175 MCG Oral Tab Take 1 tablet (175 mcg total) by mouth before breakfast. Patient takes on Wednesdays and Sundays    Only takes 0.5 tablet      allopurinol (ZYLOPRIM) 300 MG Oral Tab Take 1 tablet (300 mg total) by mouth daily.      amLODIPine 5 MG Oral Tab Take 1 tablet (5 mg total) by mouth daily.      lisinopril (PRINIVIL,ZESTRIL) 40 MG Oral Tab Take 1 tablet (40 mg total) by mouth daily.      Magnesium Oxide 400 (240 MG) MG Oral Tab Take 2 tablets (800 mg total) by mouth 2 (two) times daily.         Allergies: Droperidol    REVIEW OF SYSTEMS:  Review of Systems   All other systems reviewed and are negative.        EXAM:  There were no vitals taken for this visit.    Physical Exam  Constitutional:       Appearance: Normal appearance.   HENT:      Head: Normocephalic and atraumatic.      Mouth/Throat:      Mouth: Mucous membranes are moist.   Pulmonary:      Effort: Pulmonary effort is normal.   Abdominal:      General: Abdomen is flat.      Palpations: Abdomen is soft.   Genitourinary:     Comments: Akers draining clear, yellow urine  Skin:     General: Skin is warm and dry.   Neurological:      Mental Status: He is alert and oriented to person, place, and time.   Psychiatric:         Mood and Affect: Mood normal.         Thought Content: Thought content normal.          LABS:  No results found for: \"PSA\", \"QPSA\", \"TOTPSASCREEN\"  Lab Results   Component Value Date    WBC 12.8 (H) 03/17/2025    RBC 3.91 03/17/2025    HGB 11.2 (L) 03/17/2025    HCT 34.3 (L) 03/17/2025    MCV 87.7 03/17/2025    MCH 28.6 03/17/2025    MCHC 32.7 03/17/2025    RDW 16.1 (H) 03/17/2025    .0 (L) 03/17/2025    MPV 8.8 12/21/2017     Lab Results   Component Value Date     (H) 03/17/2025    BUN 91 (H) 03/17/2025    BUNCREA 21.9 (H) 03/17/2025    CREATSERUM 4.15 (H) 03/17/2025    ANIONGAP 10 03/17/2025    GFRNAA 31 (L)  01/12/2022    GFRAA 36 (L) 01/12/2022    CA 8.7 03/17/2025     03/17/2025    K 3.7 03/17/2025     03/17/2025    CO2 23.0 03/17/2025     Lab Results   Component Value Date    PTP 13.1 02/26/2023    INR 1.00 02/26/2023       IMAGING:  CT ABDOMEN+PELVIS(CPT=74176)    Result Date: 3/17/2025  PROCEDURE:   CT ABDOMEN+PELVIS(CPT=74176)  COMPARISON:   [facility], CT ABDOMEN + PELVIS KIDNEYSTONE 2D RNDR (NO IV NO ORAL) (CPT=741, 2/26/2023, 6:27 PM.  INDICATIONS:   urinary retention and rectal pain with h/o bowel obstruction  TECHNIQUE: CT images of the abdomen and pelvis were obtained without intravenous contrast material.  Automated exposure control for dose reduction was used. Adjustment of the mA and/or kV was done based on the patient's size. Use of iterative reconstruction technique for dose reduction was used.  Dose information is transmitted to the ACR (American College of Radiology) NRDR (National Radiology Data Registry) which includes the Dose Index Registry.  FINDINGS:  LIVER: 20 mm diameter low-density focus in the left hepatic lobe most compatible with cyst.  Additional subcentimeter low-density foci are scattered throughout the left and right hepatic lobes, which are too small to characterize and also presumed to represent cysts.  No significant biliary ductal dilatation.  Liver has normal size and contour. BILIARY: Cholecystectomy has been performed. There is no intra- or extra-hepatic biliary ductal dilatation. SPLEEN: No enlargement or focal lesion.  STOMACH: No gastric obstruction.  Duodenum is unremarkable. PANCREAS: No lesion, fluid collection, ductal dilatation, or atrophy.  ADRENALS: No nodule or enlargement. KIDNEYS: Prominent bilateral perinephric fat stranding.  No stones or hydroureteronephrosis. AORTA/VASCULAR:   Atherosclerosis of the abdominal aorta.  No aneurysm. RETROPERITONEUM: No mass or enlarged adenopathy.  BOWEL/MESENTERY:  Post right hemicolectomy with  ileocolonic anastomosis in the right upper quadrant.  Post large bowel resection with reanastomosis at the splenic flexure.  Moderate-large volume of fecal material throughout the colon.  Ectatic large bowel at the aforementioned anastomosis sites without small bowel dilatation.  No free air, free fluid, or lymphadenopathy in the abdomen or pelvis, however along the inferior aspect of the anastomosis there is an 12 x 30 x 34-mm diameter band of soft tissue (series 5, image 41).  77 mm diameter focus of fecal material within the perirectal and presacral fat.  Electronic record notes prior appendectomy.  No free air, organized measurable fluid collection, or lymphadenopathy. ABDOMINAL WALL: No suspicious mass lesion.  Linear tracks of scar within the anterior abdominal wall likely related to prior laparoscopic port access sites. URINARY BLADDER: Bladder is collapsed around a Akers catheter.  Circumferential wall thickening of the bladder. PELVIC NODES: No enlarged mass or adenopathy.   PELVIC ORGANS: No visible mass.  Pelvic organs appropriate for patient age.  BONES:   Moderate degenerative endplate change and disc disease within the spine. LUNG BASES: Linear bands of atelectasis/scar in the lung bases.  Coronary atherosclerosis.  Mitral clips are present.  The heart is borderline enlarged. OTHER: Negative.          CONCLUSION:  1.  Large volume of fecal material throughout the colon.  Prior right hemicolectomy with ileocolonic anastomosis (and electronic record notes history of colon cancer).  Prior large bowel resection with reanastomosis at the splenic flexure.  The anastomosis sites are patulous, without convincing evidence of obstruction however correlate with symptoms.  Findings suggest constipation.  There is also an 8 cm diameter focus of fecal material within the rectum which could represent impaction/stercoral colitis.  2.  Along the inferior margin of the ileocolonic anastomosis there is a nonspecific 12 x  30 x 34 mm band of soft tissue which could represent postprocedural change/scar, selene/vascular activity, or tumor deposit.  This finding is otherwise not well characterized due to lack of IV contrast.  Recommend follow-up CT imaging with IV (and oral) contrast. 3.  Scattered low-density foci within the liver most compatible with cysts however few foci are too small to definitively characterize.  At least 1 of these foci is new compared to 2/26/23 CT.  Liver is otherwise not well assessed due to lack of IV contrast. 4.  Post cholecystectomy.  No significant biliary ductal dilatation 5.  Urinary bladder is collapsed around a Hernandez catheter. 6.  Cardiomegaly.  Coronary atherosclerosis.    Dictated by (CST): Phan Lancaster MD on 3/17/2025 at 7:29 AM     Finalized by (CST): Phan Lancaster MD on 3/17/2025 at 7:44 AM            IMPRESSION:  Urinary Retention    Discussed with patient that due to the documented retention level of at least 800 mL, it would be recommended to keep his hernandez in place for 7-10 days, with today being day 5. Discussed that resolution of constipation can increase his chances of a successful VT, however that may not be the only factor causing the retention and waiting the full time can increase his odds of success. I also discussed that we typically perform VT first thing in the morning so that patients can return to the office if unable to void to have the hernandez replaced, potentially saving them an ER visit.  The patient stated that he would prefer the catheter be removed now and he will take his chances. Stressed that he must go to the ER if unable to void within 6-8 hours and should keep his appointment with [facility] next Friday. Patient expressed understanding.    The patient's catheter was removed without difficulty at 11:30 am. His urine was clear yellow.The patient tolerated it well.    PLAN:  - If unable to void in 6-8 hours, go to the ER to have catheter placed  - Follow-up with [facility]    Deb Ko, APRN  3/21/2025

## 2025-03-21 ENCOUNTER — OFFICE VISIT (OUTPATIENT)
Dept: SURGERY | Facility: CLINIC | Age: 70
End: 2025-03-21
Payer: MEDICARE

## 2025-03-21 VITALS — SYSTOLIC BLOOD PRESSURE: 127 MMHG | HEART RATE: 76 BPM | DIASTOLIC BLOOD PRESSURE: 73 MMHG

## 2025-03-21 DIAGNOSIS — R33.9 URINARY RETENTION: Primary | ICD-10-CM

## 2025-03-21 PROCEDURE — 99204 OFFICE O/P NEW MOD 45 MIN: CPT

## 2025-03-25 ENCOUNTER — CARDPULM VISIT (OUTPATIENT)
Dept: CARDIAC REHAB | Facility: HOSPITAL | Age: 70
End: 2025-03-25
Attending: INTERNAL MEDICINE
Payer: MEDICARE

## 2025-03-25 PROCEDURE — 93798 PHYS/QHP OP CAR RHAB W/ECG: CPT

## 2025-03-27 ENCOUNTER — CARDPULM VISIT (OUTPATIENT)
Dept: CARDIAC REHAB | Facility: HOSPITAL | Age: 70
End: 2025-03-27
Attending: INTERNAL MEDICINE
Payer: MEDICARE

## 2025-03-27 PROCEDURE — 93798 PHYS/QHP OP CAR RHAB W/ECG: CPT

## 2025-03-27 NOTE — PROGRESS NOTES
[facility]  Follow-up Visit    HPI:   Carlos Dick is a 70 year old male here today for follow-up. The patient was last seen on  03/21/2025.    The patient presented to the ER on 03/17 with c/o inability to void x 4 hours and rectal pain/pressure. A bladder scan was done that showed 500 mL, however they stated that they had to clamp the hernandez after 800 mL of output to prevent bladder spasms. His CT showed a large volume of fecal material throughout the colon suggesting constipation, as well as a focus of fecal material within the rectum suggesting impaction. His UA did not show evidence of blood or infection. Cr 4.15, chronically elevated however this is slightly higher than baseline (2.96). The patient is following with a nephrologist.    The patient denied urinary symptoms prior to this episode. Given Tamsulosin 0.4 mg every day at his ER visit but did not take it. He feels as though his bowel movements are more regular since his ER visit, bordering on loose. When he presented to the office at his previous visit, he was on day 5 of having the hernandez. It was recommended he keep the hernandez in until day 7-10 based on level of retention. The patient preferred to have the hernandez removed that day. He states he has had no difficulty with urinating since removal. PVR 0 mL.    He had an appointment on 03/28/25 with [facility] to establish care there, however an appointment wasn't actually available until May.    Past Medical History:    Bilateral cataracts    bilateral cataract surgery    Bowel obstruction (HCC)    Cataract    Colon cancer (HCC)    Essential hypertension    H/O bone marrow transplant (HCC)    High blood pressure    History of thyroid cancer    Non Hodgkin's lymphoma (HCC)    Pancreatitis (HCC)    Pneumothorax    partial pneumothorax left lung and minimal on right side     Past Surgical History:   Procedure Laterality Date    Appendectomy      Appendectomy      Cataract Bilateral      Cholecystectomy      Thyroidectomy      Tonsillectomy      Vasectomy       No family history on file.  Social History     Socioeconomic History    Marital status:    Tobacco Use    Smoking status: Never    Smokeless tobacco: Never   Vaping Use    Vaping status: Never Used   Substance and Sexual Activity    Alcohol use: Yes     Alcohol/week: 0.0 standard drinks of alcohol     Comment: socially    Drug use: Never   Other Topics Concern    Caffeine Concern Yes     Comment: coffee, 1 cup per day    Pt has a pacemaker No    Reaction to local anesthetic No     Social Drivers of Health     Food Insecurity: Low Risk  (11/13/2024)    Received from [facility]    Food Insecurity     Have there been times that your food ran out, and you didn't have money to get more?: No     Are there times that you worry that this might happen?: No   Transportation Needs: Low Risk  (11/13/2024)    Received from [facility]    Transportation Needs     Do you have trouble getting transportation to medical appointments?: No   Housing Stability: Low Risk  (11/13/2024)    Received from [facility]    Housing Stability     Are you worried that your electric, gas, oil, or water might be shut off?: No     Are you concerned about having a safe and reliable place to live?: No     Current Outpatient Medications   Medication Sig Dispense Refill    COPPER OR Take by mouth.      SODIUM BICARBONATE OR Take by mouth.      Cyanocobalamin (VITAMIN B-12 OR) Take by mouth.      benzonatate 100 MG Oral Cap Take 1 capsule (100 mg total) by mouth 3 (three) times daily as needed for cough. 20 capsule 0    acetaminophen 325 MG Oral Tab Take 2 tablets (650 mg total) by mouth every 6 (six) hours as needed. 1 tablet 0    ondansetron 4 MG Oral Tablet Dispersible Take 1 tablet (4 mg total) by mouth every 8 (eight) hours as needed for Nausea. 10 tablet 0    Cholecalciferol (VITAMIN D) 1000 units Oral Tab  Take 1,000 Units by mouth daily      Rosuvastatin Calcium 5 MG Oral Tab Take 1 tablet (5 mg total) by mouth nightly.      Levothyroxine Sodium 175 MCG Oral Tab Take 1 tablet (175 mcg total) by mouth before breakfast. Patient takes on Wednesdays and Sundays    Only takes 0.5 tablet      allopurinol (ZYLOPRIM) 300 MG Oral Tab Take 1 tablet (300 mg total) by mouth daily.      amLODIPine 5 MG Oral Tab Take 1 tablet (5 mg total) by mouth daily.      lisinopril (PRINIVIL,ZESTRIL) 40 MG Oral Tab Take 1 tablet (40 mg total) by mouth daily.      Magnesium Oxide 400 (240 MG) MG Oral Tab Take 2 tablets (800 mg total) by mouth 2 (two) times daily.         Allergies: Droperidol    REVIEW OF SYSTEMS:  Review of Systems   All other systems reviewed and are negative.        EXAM:  There were no vitals taken for this visit.    Physical Exam  Constitutional:       Appearance: Normal appearance.   HENT:      Head: Normocephalic and atraumatic.      Mouth/Throat:      Mouth: Mucous membranes are moist.   Pulmonary:      Effort: Pulmonary effort is normal.   Abdominal:      General: Abdomen is flat.      Palpations: Abdomen is soft.   Skin:     General: Skin is warm and dry.   Neurological:      Mental Status: He is alert and oriented to person, place, and time.   Psychiatric:         Mood and Affect: Mood normal.         Thought Content: Thought content normal.          LABS:  No results found for: \"PSA\", \"QPSA\", \"TOTPSASCREEN\"  Lab Results   Component Value Date    WBC 12.8 (H) 03/17/2025    RBC 3.91 03/17/2025    HGB 11.2 (L) 03/17/2025    HCT 34.3 (L) 03/17/2025    MCV 87.7 03/17/2025    MCH 28.6 03/17/2025    MCHC 32.7 03/17/2025    RDW 16.1 (H) 03/17/2025    .0 (L) 03/17/2025    MPV 8.8 12/21/2017     Lab Results   Component Value Date     (H) 03/17/2025    BUN 91 (H) 03/17/2025    BUNCREA 21.9 (H) 03/17/2025    CREATSERUM 4.15 (H) 03/17/2025    ANIONGAP 10 03/17/2025    GFRNAA 31 (L) 01/12/2022    GFRAA 36 (L)  01/12/2022    CA 8.7 03/17/2025     03/17/2025    K 3.7 03/17/2025     03/17/2025    CO2 23.0 03/17/2025     Lab Results   Component Value Date    PTP 13.1 02/26/2023    INR 1.00 02/26/2023       IMAGING:  CT ABDOMEN+PELVIS(CPT=74176)    Result Date: 3/17/2025  PROCEDURE:   CT ABDOMEN+PELVIS(CPT=74176)  COMPARISON:   [facility], CT ABDOMEN + PELVIS KIDNEYSTONE 2D RNDR (NO IV NO ORAL) (CPT=741, 2/26/2023, 6:27 PM.  INDICATIONS:   urinary retention and rectal pain with h/o bowel obstruction  TECHNIQUE: CT images of the abdomen and pelvis were obtained without intravenous contrast material.  Automated exposure control for dose reduction was used. Adjustment of the mA and/or kV was done based on the patient's size. Use of iterative reconstruction technique for dose reduction was used.  Dose information is transmitted to the ACR (American College of Radiology) NRDR (National Radiology Data Registry) which includes the Dose Index Registry.  FINDINGS:  LIVER: 20 mm diameter low-density focus in the left hepatic lobe most compatible with cyst.  Additional subcentimeter low-density foci are scattered throughout the left and right hepatic lobes, which are too small to characterize and also presumed to represent cysts.  No significant biliary ductal dilatation.  Liver has normal size and contour. BILIARY: Cholecystectomy has been performed. There is no intra- or extra-hepatic biliary ductal dilatation. SPLEEN: No enlargement or focal lesion.  STOMACH: No gastric obstruction.  Duodenum is unremarkable. PANCREAS: No lesion, fluid collection, ductal dilatation, or atrophy.  ADRENALS: No nodule or enlargement. KIDNEYS: Prominent bilateral perinephric fat stranding.  No stones or hydroureteronephrosis. AORTA/VASCULAR:   Atherosclerosis of the abdominal aorta.  No aneurysm. RETROPERITONEUM: No mass or enlarged adenopathy.  BOWEL/MESENTERY:  Post right hemicolectomy with ileocolonic anastomosis in the  right upper quadrant.  Post large bowel resection with reanastomosis at the splenic flexure.  Moderate-large volume of fecal material throughout the colon.  Ectatic large bowel at the aforementioned anastomosis sites without small bowel dilatation.  No free air, free fluid, or lymphadenopathy in the abdomen or pelvis, however along the inferior aspect of the anastomosis there is an 12 x 30 x 34-mm diameter band of soft tissue (series 5, image 41).  77 mm diameter focus of fecal material within the perirectal and presacral fat.  Electronic record notes prior appendectomy.  No free air, organized measurable fluid collection, or lymphadenopathy. ABDOMINAL WALL: No suspicious mass lesion.  Linear tracks of scar within the anterior abdominal wall likely related to prior laparoscopic port access sites. URINARY BLADDER: Bladder is collapsed around a Akers catheter.  Circumferential wall thickening of the bladder. PELVIC NODES: No enlarged mass or adenopathy.   PELVIC ORGANS: No visible mass.  Pelvic organs appropriate for patient age.  BONES:   Moderate degenerative endplate change and disc disease within the spine. LUNG BASES: Linear bands of atelectasis/scar in the lung bases.  Coronary atherosclerosis.  Mitral clips are present.  The heart is borderline enlarged. OTHER: Negative.          CONCLUSION:  1.  Large volume of fecal material throughout the colon.  Prior right hemicolectomy with ileocolonic anastomosis (and electronic record notes history of colon cancer).  Prior large bowel resection with reanastomosis at the splenic flexure.  The anastomosis sites are patulous, without convincing evidence of obstruction however correlate with symptoms.  Findings suggest constipation.  There is also an 8 cm diameter focus of fecal material within the rectum which could represent impaction/stercoral colitis.  2.  Along the inferior margin of the ileocolonic anastomosis there is a nonspecific 12 x 30 x 34 mm band of soft tissue  which could represent postprocedural change/scar, selene/vascular activity, or tumor deposit.  This finding is otherwise not well characterized due to lack of IV contrast.  Recommend follow-up CT imaging with IV (and oral) contrast. 3.  Scattered low-density foci within the liver most compatible with cysts however few foci are too small to definitively characterize.  At least 1 of these foci is new compared to 2/26/23 CT.  Liver is otherwise not well assessed due to lack of IV contrast. 4.  Post cholecystectomy.  No significant biliary ductal dilatation 5.  Urinary bladder is collapsed around a Akers catheter. 6.  Cardiomegaly.  Coronary atherosclerosis.    Dictated by (CST): Phan Lancaster MD on 3/17/2025 at 7:29 AM     Finalized by (CST): Phan Lancaster MD on 3/17/2025 at 7:44 AM            IMPRESSION:  Urinary Retention    PLAN:  - Bowel regimen to prevent constipation  - Follow-up with [facility]    LUCILLE Macias  3/31/2025

## 2025-03-31 ENCOUNTER — OFFICE VISIT (OUTPATIENT)
Dept: SURGERY | Facility: CLINIC | Age: 70
End: 2025-03-31
Payer: MEDICARE

## 2025-03-31 DIAGNOSIS — R33.9 URINARY RETENTION: Primary | ICD-10-CM

## 2025-03-31 PROCEDURE — 99213 OFFICE O/P EST LOW 20 MIN: CPT

## 2025-04-01 ENCOUNTER — CARDPULM VISIT (OUTPATIENT)
Dept: CARDIAC REHAB | Facility: HOSPITAL | Age: 70
End: 2025-04-01
Attending: INTERNAL MEDICINE
Payer: MEDICARE

## 2025-04-01 PROCEDURE — 93798 PHYS/QHP OP CAR RHAB W/ECG: CPT

## 2025-04-03 ENCOUNTER — APPOINTMENT (OUTPATIENT)
Dept: CARDIAC REHAB | Facility: HOSPITAL | Age: 70
End: 2025-04-03
Attending: INTERNAL MEDICINE
Payer: MEDICARE

## 2025-04-08 ENCOUNTER — APPOINTMENT (OUTPATIENT)
Dept: CARDIAC REHAB | Facility: HOSPITAL | Age: 70
End: 2025-04-08
Attending: INTERNAL MEDICINE
Payer: MEDICARE

## 2025-04-10 ENCOUNTER — CARDPULM VISIT (OUTPATIENT)
Dept: CARDIAC REHAB | Facility: HOSPITAL | Age: 70
End: 2025-04-10
Attending: INTERNAL MEDICINE
Payer: MEDICARE

## 2025-04-10 PROCEDURE — 93798 PHYS/QHP OP CAR RHAB W/ECG: CPT

## 2025-04-15 ENCOUNTER — CARDPULM VISIT (OUTPATIENT)
Dept: CARDIAC REHAB | Facility: HOSPITAL | Age: 70
End: 2025-04-15
Attending: INTERNAL MEDICINE
Payer: MEDICARE

## 2025-04-15 PROCEDURE — 93798 PHYS/QHP OP CAR RHAB W/ECG: CPT

## 2025-04-17 ENCOUNTER — CARDPULM VISIT (OUTPATIENT)
Dept: CARDIAC REHAB | Facility: HOSPITAL | Age: 70
End: 2025-04-17
Attending: INTERNAL MEDICINE
Payer: MEDICARE

## 2025-04-17 PROCEDURE — 93798 PHYS/QHP OP CAR RHAB W/ECG: CPT

## 2025-04-22 ENCOUNTER — APPOINTMENT (OUTPATIENT)
Dept: CARDIAC REHAB | Facility: HOSPITAL | Age: 70
End: 2025-04-22
Attending: INTERNAL MEDICINE
Payer: MEDICARE

## 2025-04-24 ENCOUNTER — APPOINTMENT (OUTPATIENT)
Dept: CARDIAC REHAB | Facility: HOSPITAL | Age: 70
End: 2025-04-24
Attending: INTERNAL MEDICINE
Payer: MEDICARE

## 2025-04-29 ENCOUNTER — APPOINTMENT (OUTPATIENT)
Dept: CARDIAC REHAB | Facility: HOSPITAL | Age: 70
End: 2025-04-29
Attending: INTERNAL MEDICINE
Payer: MEDICARE

## 2025-05-01 ENCOUNTER — APPOINTMENT (OUTPATIENT)
Dept: CARDIAC REHAB | Facility: HOSPITAL | Age: 70
End: 2025-05-01
Attending: INTERNAL MEDICINE
Payer: MEDICARE

## 2025-05-06 ENCOUNTER — APPOINTMENT (OUTPATIENT)
Dept: CARDIAC REHAB | Facility: HOSPITAL | Age: 70
End: 2025-05-06
Attending: INTERNAL MEDICINE
Payer: MEDICARE

## 2025-05-08 ENCOUNTER — APPOINTMENT (OUTPATIENT)
Dept: CARDIAC REHAB | Facility: HOSPITAL | Age: 70
End: 2025-05-08
Attending: INTERNAL MEDICINE
Payer: MEDICARE

## 2025-06-03 ENCOUNTER — APPOINTMENT (OUTPATIENT)
Dept: CARDIAC REHAB | Facility: HOSPITAL | Age: 70
End: 2025-06-03
Attending: INTERNAL MEDICINE
Payer: MEDICARE

## 2025-06-05 ENCOUNTER — APPOINTMENT (OUTPATIENT)
Dept: CARDIAC REHAB | Facility: HOSPITAL | Age: 70
End: 2025-06-05
Attending: INTERNAL MEDICINE
Payer: MEDICARE

## 2025-06-10 ENCOUNTER — APPOINTMENT (OUTPATIENT)
Dept: CARDIAC REHAB | Facility: HOSPITAL | Age: 70
End: 2025-06-10
Attending: INTERNAL MEDICINE
Payer: MEDICARE

## 2025-06-12 ENCOUNTER — APPOINTMENT (OUTPATIENT)
Dept: CARDIAC REHAB | Facility: HOSPITAL | Age: 70
End: 2025-06-12
Attending: INTERNAL MEDICINE
Payer: MEDICARE

## (undated) DEVICE — SPEAR EYE WECKCEL CELLULOSE LT/BLU PK/6

## (undated) DEVICE — IMMOB HD UNIV CLR DISP

## (undated) DEVICE — STERILE TOOTHPICK SET: Brand: CENTURION

## (undated) DEVICE — CROUCH CORNEAL PROTECTOR: Brand: BAUSCH + LOMB

## (undated) DEVICE — BLD SCLPL BEAVR MINI STR 2BVL 180D LF

## (undated) DEVICE — WIPE INST MEROCEL

## (undated) DEVICE — TRY SKINPREP DRYPREP

## (undated) DEVICE — SUT GUT PLN FAST ABS 5/0 PC1 18IN 1915G

## (undated) DEVICE — ELECTRD NDL EZ CLN MOD 2.75IN

## (undated) DEVICE — STERILE COTTON TIP 6IN 10PK: Brand: MEDLINE

## (undated) DEVICE — SUT VIC 8/0 TG140 12IN J548G

## (undated) DEVICE — GLV SURG BIOGEL SENSR LTX PF SZ7.5

## (undated) DEVICE — SUT VIC 5/0 P3 18IN J493G

## (undated) DEVICE — NDL HYPO PRECISIONGLIDE REG 25G 1 1/2

## (undated) DEVICE — GOWN,NON-REINFORCED,SIRUS,SET IN SLV,XXL: Brand: MEDLINE

## (undated) DEVICE — SUT SILK G3 DBL/ARM 4/0 18IN BLK

## (undated) DEVICE — SWABSTK SKINPREP PVPI LF PK/3

## (undated) DEVICE — INTENDED FOR TISSUE SEPARATION, AND OTHER PROCEDURES THAT REQUIRE A SHARP SURGICAL BLADE TO PUNCTURE OR CUT.: Brand: BARD-PARKER ® CARBON RIB-BACK BLADES

## (undated) DEVICE — PK ENT 40

## (undated) DEVICE — SYR LL TP 10ML STRL

## (undated) DEVICE — CAUTRY LO TEMP FINETP

## (undated) DEVICE — ELECTRD BLD EZ CLN MOD 2.5IN

## (undated) NOTE — MR AVS SNAPSHOT
[facility] SPECIAL[facility] [place] [address] [address] [id number] [phone]               Thank you for choosing us for your health care visit with Sophie Rivera MD.  We are glad to serve you and happy to provide you with this summary of Enter your [employer] Activation Code exactly as it appears below along with your Zip Code and Date of Birth to complete the sign-up process. If you do not sign up before the expiration date, you must request a new code.     Your unique [employer] Access Code: BW

## (undated) NOTE — IP AVS SNAPSHOT
[address]  [address] [facility] ~ [phone]                Discharge Summary   6/16/2017    [place] Fraction           Admission Information        Provider Department    6/16/2017 ARCADIO Smith Levothyroxine Sodium 175 MCG Tabs   Commonly known as:  SYNTHROID, LEVOTHROID        Take 175 mcg by mouth before breakfast.                            lisinopril 40 MG Tabs   Commonly known as:  PRINIVIL,ZESTRIL        Take 40 mg by mouth d Neutrophil % Lymphocyte % Monocyte % Eosinophil % Basophil % Prelim Neut Abs Final Neut Abs Lymphocyte Abso Monocyte Absolu Eosinophil Abso Basophil Absolu    (06/17/17)  79 (06/17/17)  10 (06/17/17)  8 (06/17/17)  2 (06/17/17)  0 -- (06/17/17)  6.9 (06/1 [employer] will allow you to access patient instructions from your recent visit,  view other health information, and more. To sign up or find more information, go to [email or web address] and click on the Sign Up Now link in the [employer] box.      Enter Most common side effects: Dizziness, loss of potassium (increased urination is expected)   What to report to your healthcare team: Dizziness, decreased urine amount           Cholesterol Lowering Medications     Rosuvastatin Calcium 5 MG Oral Tab       Use

## (undated) NOTE — ED AVS SNAPSHOT
Smileyakinjames EastmanCasimiro   MRN: [id number]    Department:  [facility] Emergency Department   Date of Visit:  11/26/2019           Disclosure     Insurance plans vary and the physician(s) referred by the ER may not be covered by your plan.  Please contact CARE PHYSICIAN AT ONCE OR RETURN IMMEDIATELY TO THE EMERGENCY DEPARTMENT. If you have been prescribed any medication(s), please fill your prescription right away and begin taking the medication(s) as directed.   If you believe that any of the medications